# Patient Record
Sex: MALE | Race: WHITE | NOT HISPANIC OR LATINO | Employment: OTHER | URBAN - METROPOLITAN AREA
[De-identification: names, ages, dates, MRNs, and addresses within clinical notes are randomized per-mention and may not be internally consistent; named-entity substitution may affect disease eponyms.]

---

## 2017-01-27 ENCOUNTER — PATIENT MESSAGE (OUTPATIENT)
Dept: FAMILY MEDICINE | Facility: CLINIC | Age: 67
End: 2017-01-27

## 2017-01-27 RX ORDER — TRAMADOL HYDROCHLORIDE 50 MG/1
50 TABLET ORAL EVERY 6 HOURS PRN
Qty: 30 TABLET | Refills: 0 | Status: SHIPPED | OUTPATIENT
Start: 2017-01-27 | End: 2017-02-11

## 2017-01-27 NOTE — TELEPHONE ENCOUNTER
I have refilled the patient's requested medication x 1 month.   However, this is a scheduled narcotic and now needs a face to face visit every 6 months.

## 2017-03-10 ENCOUNTER — PATIENT MESSAGE (OUTPATIENT)
Dept: FAMILY MEDICINE | Facility: CLINIC | Age: 67
End: 2017-03-10

## 2017-03-10 RX ORDER — ATORVASTATIN CALCIUM 40 MG/1
TABLET, FILM COATED ORAL
Qty: 30 TABLET | Status: CANCELLED | OUTPATIENT
Start: 2017-03-10

## 2017-03-10 RX ORDER — ATORVASTATIN CALCIUM 40 MG/1
40 TABLET, FILM COATED ORAL DAILY
Qty: 90 TABLET | Refills: 2 | Status: SHIPPED | OUTPATIENT
Start: 2017-03-10 | End: 2017-03-27 | Stop reason: SDUPTHER

## 2017-03-10 RX ORDER — CLOPIDOGREL BISULFATE 75 MG/1
TABLET ORAL
Qty: 90 TABLET | Refills: 2 | Status: SHIPPED | OUTPATIENT
Start: 2017-03-10 | End: 2017-03-27 | Stop reason: SDUPTHER

## 2017-03-14 ENCOUNTER — PATIENT OUTREACH (OUTPATIENT)
Dept: ADMINISTRATIVE | Facility: HOSPITAL | Age: 67
End: 2017-03-14

## 2017-03-14 DIAGNOSIS — I10 ESSENTIAL HYPERTENSION: Primary | ICD-10-CM

## 2017-03-14 DIAGNOSIS — E78.5 HYPERLIPIDEMIA, UNSPECIFIED HYPERLIPIDEMIA TYPE: ICD-10-CM

## 2017-03-27 ENCOUNTER — TELEPHONE (OUTPATIENT)
Dept: FAMILY MEDICINE | Facility: CLINIC | Age: 67
End: 2017-03-27

## 2017-03-27 ENCOUNTER — OFFICE VISIT (OUTPATIENT)
Dept: FAMILY MEDICINE | Facility: CLINIC | Age: 67
End: 2017-03-27
Payer: MEDICARE

## 2017-03-27 VITALS
HEIGHT: 70 IN | BODY MASS INDEX: 28.68 KG/M2 | HEART RATE: 55 BPM | TEMPERATURE: 98 F | DIASTOLIC BLOOD PRESSURE: 61 MMHG | WEIGHT: 200.31 LBS | SYSTOLIC BLOOD PRESSURE: 107 MMHG

## 2017-03-27 DIAGNOSIS — M70.61 TROCHANTERIC BURSITIS, RIGHT HIP: ICD-10-CM

## 2017-03-27 DIAGNOSIS — E78.5 HYPERLIPIDEMIA, UNSPECIFIED HYPERLIPIDEMIA TYPE: Primary | ICD-10-CM

## 2017-03-27 DIAGNOSIS — F51.01 PRIMARY INSOMNIA: ICD-10-CM

## 2017-03-27 DIAGNOSIS — I10 ESSENTIAL HYPERTENSION: ICD-10-CM

## 2017-03-27 DIAGNOSIS — F41.9 ANXIETY: ICD-10-CM

## 2017-03-27 DIAGNOSIS — D68.9 CLOTTING DISORDER: ICD-10-CM

## 2017-03-27 PROCEDURE — 1160F RVW MEDS BY RX/DR IN RCRD: CPT | Mod: S$GLB,,, | Performed by: FAMILY MEDICINE

## 2017-03-27 PROCEDURE — 99999 PR PBB SHADOW E&M-EST. PATIENT-LVL III: CPT | Mod: PBBFAC,,, | Performed by: FAMILY MEDICINE

## 2017-03-27 PROCEDURE — 1159F MED LIST DOCD IN RCRD: CPT | Mod: S$GLB,,, | Performed by: FAMILY MEDICINE

## 2017-03-27 PROCEDURE — 1126F AMNT PAIN NOTED NONE PRSNT: CPT | Mod: S$GLB,,, | Performed by: FAMILY MEDICINE

## 2017-03-27 PROCEDURE — 99499 UNLISTED E&M SERVICE: CPT | Mod: S$GLB,,, | Performed by: FAMILY MEDICINE

## 2017-03-27 PROCEDURE — 3074F SYST BP LT 130 MM HG: CPT | Mod: S$GLB,,, | Performed by: FAMILY MEDICINE

## 2017-03-27 PROCEDURE — 3078F DIAST BP <80 MM HG: CPT | Mod: S$GLB,,, | Performed by: FAMILY MEDICINE

## 2017-03-27 PROCEDURE — 1157F ADVNC CARE PLAN IN RCRD: CPT | Mod: S$GLB,,, | Performed by: FAMILY MEDICINE

## 2017-03-27 PROCEDURE — 99214 OFFICE O/P EST MOD 30 MIN: CPT | Mod: S$GLB,,, | Performed by: FAMILY MEDICINE

## 2017-03-27 RX ORDER — ALPRAZOLAM 0.5 MG/1
0.5 TABLET ORAL 3 TIMES DAILY
Qty: 270 TABLET | Refills: 1 | Status: SHIPPED | OUTPATIENT
Start: 2017-03-27 | End: 2018-02-21

## 2017-03-27 RX ORDER — ZOLPIDEM TARTRATE 5 MG/1
5 TABLET ORAL NIGHTLY PRN
Qty: 90 TABLET | Refills: 1 | Status: SHIPPED | OUTPATIENT
Start: 2017-03-27 | End: 2017-06-22 | Stop reason: SDUPTHER

## 2017-03-27 RX ORDER — METOPROLOL TARTRATE 25 MG/1
25 TABLET, FILM COATED ORAL DAILY
Qty: 90 TABLET | Refills: 3 | Status: SHIPPED | OUTPATIENT
Start: 2017-03-27 | End: 2017-08-21 | Stop reason: SDUPTHER

## 2017-03-27 RX ORDER — TRAMADOL HYDROCHLORIDE 50 MG/1
50 TABLET ORAL EVERY 6 HOURS PRN
Qty: 40 TABLET | Refills: 0 | Status: SHIPPED | OUTPATIENT
Start: 2017-03-27 | End: 2017-05-12 | Stop reason: SDUPTHER

## 2017-03-27 RX ORDER — ALPRAZOLAM 0.5 MG/1
0.5 TABLET, EXTENDED RELEASE ORAL DAILY
Qty: 90 TABLET | Refills: 1 | Status: SHIPPED | OUTPATIENT
Start: 2017-03-27 | End: 2017-03-27

## 2017-03-27 RX ORDER — CLOPIDOGREL BISULFATE 75 MG/1
75 TABLET ORAL DAILY
Qty: 90 TABLET | Refills: 3 | Status: SHIPPED | OUTPATIENT
Start: 2017-03-27 | End: 2018-01-02

## 2017-03-27 RX ORDER — ATORVASTATIN CALCIUM 40 MG/1
40 TABLET, FILM COATED ORAL DAILY
Qty: 90 TABLET | Refills: 3 | Status: SHIPPED | OUTPATIENT
Start: 2017-03-27 | End: 2017-05-12

## 2017-03-27 NOTE — TELEPHONE ENCOUNTER
Patient was given a prescription for xanax xr  Insurance does not cover xr  He had been on plain xanax 0.5 three times daily  Please advise

## 2017-03-27 NOTE — TELEPHONE ENCOUNTER
I have signed for the following orders AND/OR meds.  Please call the patient and ask the patient to schedule the testing AND/OR inform about any medications that were sent.      No orders of the defined types were placed in this encounter.        Medications Ordered This Encounter      alprazolam (XANAX) 0.5 MG tablet          Sig: Take 1 tablet (0.5 mg total) by mouth 3 (three) times daily.          Dispense:  270 tablet          Refill:  1

## 2017-03-27 NOTE — TELEPHONE ENCOUNTER
----- Message from Dahiana Tomas sent at 3/27/2017 11:38 AM CDT -----  Call [facility] at  [phone]//regarding the  xanax rx from this morning have questions//gustavo raza

## 2017-03-27 NOTE — MR AVS SNAPSHOT
[facility]  [address] [facility] [place] [address]  Phone: [phone]  Fax: [phone]                  Cruz [place]   3/27/2017 8:00 AM   Office Visit    Description:  Male : 1950   Provider:  Binh Cat MD   Department:  [facility]           Reason for Visit     Follow-up           Diagnoses this Visit        Comments    Hyperlipidemia, unspecified hyperlipidemia type    -  Primary     Essential hypertension         Clotting disorder         Anxiety         Primary insomnia         Trochanteric bursitis, right hip                To Do List           Future Appointments        Provider Department Dept Phone    2017 8:35 AM LABORATORY, [place] [facility] [phone]      Goals (5 Years of Data)     None       These Medications        Disp Refills Start End    metoprolol tartrate (LOPRESSOR) 25 MG tablet 90 tablet 3 3/27/2017     Take 1 tablet (25 mg total) by mouth once daily. 1 Tablet(s) Oral PRN Every day. - Oral    Pharmacy: [facility] [place], [place] - [address] [place] [address] Ph #: [phone]       clopidogrel (PLAVIX) 75 mg tablet 90 tablet 3 3/27/2017     Take 1 tablet (75 mg total) by mouth once daily. - Oral    Pharmacy: [facility] [place]  [id number] [place] [address] Ph #: [phone]       atorvastatin (LIPITOR) 40 MG tablet 90 tablet 3 3/27/2017     Take 1 tablet (40 mg total) by mouth once daily. - Oral    Pharmacy: [facility] [place], [place] - [id number] [place] [address] Ph #: [phone]       alprazolam (XANAX XR) 0.5 MG Tb24 90 tablet 1 3/27/2017     Take 1 tablet (0.5 mg total) by mouth once daily. - Oral    [facility]  [id number] [place] [address] Ph #: [phone]       zolpidem (AMBIEN) 5 MG Tab 90 tablet 1 3/27/2017 2017    Take 1 tablet (5 mg total) by mouth nightly as needed. - Oral    Pharmacy: [facility]  [place] [address] Ph #: [phone]       tramadol (ULTRAM) 50 mg tablet 40 tablet 0 3/27/2017 4/6/2017    Take 1 tablet (50 mg total) by mouth every 6 (six) hours as needed for Pain. - Oral    Pharmacy: [facility] [place] [address] Ph #: [phone]         [facility]     [facility] On Call Nurse Care Line - 24/7 Assistance  Registered nurses in the [facility] On Call Center provide clinical advisement, health education, appointment booking, and other advisory services.  Call for this free service at [phone].             Medications           Message regarding Medications     Verify the changes and/or additions to your medication regime listed below are the same as discussed with your clinician today.  If any of these changes or additions are incorrect, please notify your healthcare provider.        START taking these NEW medications        Refills    zolpidem (AMBIEN) 5 MG Tab 1    Sig: Take 1 tablet (5 mg total) by mouth nightly as needed.    Class: Normal    Route: Oral    tramadol (ULTRAM) 50 mg tablet 0    Sig: Take 1 tablet (50 mg total) by mouth every 6 (six) hours as needed for Pain.    Class: Normal    Route: Oral      CHANGE how you are taking these medications     Start Taking Instead of    clopidogrel (PLAVIX) 75 mg tablet clopidogrel (PLAVIX) 75 mg tablet    Dosage:  Take 1 tablet (75 mg total) by mouth once daily. Dosage:  TAKE ONE TABLET BY MOUTH ONCE DAILY    Reason for Change:  Reorder     alprazolam (XANAX XR) 0.5 MG Tb24 alprazolam (XANAX XR) 0.5 MG Tb24    Dosage:  Take 1 tablet (0.5 mg total) by mouth once daily. Dosage:  Take by mouth once daily.    Reason for Change:  Reorder       STOP taking these medications     diclofenac (VOLTAREN) 75 MG EC tablet Take 1 tablet (75 mg total) by mouth 2 (two) times daily.    colchicine 0.6 mg tablet Take 1 tablet (0.6 mg total) by mouth once daily.           Verify that the below list of medications is  "an accurate representation of the medications you are currently taking.  If none reported, the list may be blank. If incorrect, please contact your healthcare provider. Carry this list with you in case of emergency.           Current Medications     alprazolam (XANAX XR) 0.5 MG Tb24 Take 1 tablet (0.5 mg total) by mouth once daily.    aspirin 81 MG chewable tablet 81mg Oral PRN .      atorvastatin (LIPITOR) 40 MG tablet Take 1 tablet (40 mg total) by mouth once daily.    clopidogrel (PLAVIX) 75 mg tablet Take 1 tablet (75 mg total) by mouth once daily.    metoprolol tartrate (LOPRESSOR) 25 MG tablet Take 1 tablet (25 mg total) by mouth once daily. 1 Tablet(s) Oral PRN Every day.    tramadol (ULTRAM) 50 mg tablet Take 1 tablet (50 mg total) by mouth every 6 (six) hours as needed for Pain.    zolpidem (AMBIEN) 5 MG Tab Take 1 tablet (5 mg total) by mouth nightly as needed.           Clinical Reference Information           Your Vitals Were     BP Pulse Temp Height Weight BMI    107/61 (BP Location: Left arm, Patient Position: Sitting, BP Method: Automatic) 55 97.9 °F (36.6 °C) (Oral) 5' 10" (1.778 m) 90.9 kg (200 lb 4.6 oz) 28.74 kg/m2      Blood Pressure          Most Recent Value    BP  107/61      Allergies as of 3/27/2017     Penicillin G    Penicillins      Immunizations Administered on Date of Encounter - 3/27/2017     None      Language Assistance Services     ATTENTION: Language assistance services are available, free of charge. Please call [phone].      ATENCIÓN: Si habla español, tiene a paige disposición servicios gratuitos de asistencia lingüística. Llame al [phone].     [facility] Ý: N?u b?n nói Ti?ng Vi?t, có các d?ch v? h? tr? ngôn ng? mi?n phí dành cho b?n. G?i s? [phone].         [facility] complies with applicable Federal civil rights laws and does not discriminate on the basis of race, color, national origin, age, disability, or sex.        "

## 2017-03-27 NOTE — PROGRESS NOTES
"Subjective:      Patient ID: Cruz [place] is a 66 y.o. male.    Chief Complaint: Follow-up    HPI  The patient presents with hyperlipidemia. The patient reports tolerating the medication well and is in excellent compliance. There have been no medication side effects. The patient denies chest pain, neuropathy, and myalgias. The patient has reduced fat intake and has been exercising. Current treatment has included the medications listed in the med card.   Lab Results   Component Value Date    CHOL 140 11/07/2016    CHOL 150 05/06/2016    CHOL 142 11/05/2015     Lab Results   Component Value Date    HDL 42 11/07/2016    HDL 45 05/06/2016    HDL 41 11/05/2015     Lab Results   Component Value Date    LDLCALC 78.2 11/07/2016    LDLCALC 88.4 05/06/2016    LDLCALC 77.6 11/05/2015     Lab Results   Component Value Date    TRIG 99 11/07/2016    TRIG 83 05/06/2016    TRIG 117 11/05/2015     Lab Results   Component Value Date    CHOLHDL 30.0 11/07/2016    CHOLHDL 30.0 05/06/2016    CHOLHDL 28.9 11/05/2015     Lab Results   Component Value Date    ALT 19 11/07/2016    AST 21 11/07/2016    ALKPHOS 64 11/07/2016    BILITOT 0.3 11/07/2016   a recheck is due on 5/9 and orders are in.       The patient presents with essential hypertension. The patient is tolerating the medication well and is in excellent compliance. The patient is experiencing no side effects. Counseling was offered regarding low salt diets. The patient has a reduced salt intake. The patient denies chest pain, palpitations, shortness of breath, dyspnea on exertion, left or murmur neck pain, nausea, vomiting, diaphoresis, paroxysmal nocturnal dyspnea, and orthopnea. /61 (BP Location: Left arm, Patient Position: Sitting, BP Method: Automatic)  Pulse (!) 55  Temp 97.9 °F (36.6 °C) (Oral)   Ht 5' 10" (1.778 m)  Wt 90.9 kg (200 lb 4.6 oz)  BMI 28.74 kg/m2       He does have a history of a clotting disorder and he has been on plavix and asa since 2011. He " has not had any bleeding noted. hei s also on aspirin.      He is on xanax 0.5 twice a day. It has helped with his anxiety. HE has been on this for a long time.      He has an intermittent flare of his trochanteric bursitis and he had some tramadol and he states that he would like to have a refill on hand in case this hurts him.       Health Maintenance Due   Topic Date Due    Zoster Vaccine  06/27/2009    Influenza Vaccine  08/01/2016       Past Medical History:  Past Medical History:   Diagnosis Date    Anemia     Colon polyps 8/14/2012    adenomatous-recheck in 2017    Diverticulosis     Hyperlipidemia LDL goal < 100     Hypertension      Past Surgical History:   Procedure Laterality Date    cardiac bypass      CARPAL TUNNEL RELEASE      COLONOSCOPY  2012    CORONARY ARTERY BYPASS GRAFT      HERNIA MESH REMOVAL      HERNIA REPAIR      NASAL SEPTUM SURGERY      septoplasty and SMR turbinoplasty     [place] COLONOSCOPY,SHIKHA MONTESINOS/CAUTERCLAYTON  8/14/2012         VASECTOMY      VASECTOMY       Review of patient's allergies indicates:   Allergen Reactions    Penicillin g     Penicillins      Other reaction(s): Unknown     Current Outpatient Prescriptions on File Prior to Visit   Medication Sig Dispense Refill    alprazolam (XANAX XR) 0.5 MG Tb24 Take by mouth once daily.      aspirin 81 MG chewable tablet 81mg Oral PRN .        atorvastatin (LIPITOR) 40 MG tablet Take 1 tablet (40 mg total) by mouth once daily. 90 tablet 2    clopidogrel (PLAVIX) 75 mg tablet TAKE ONE TABLET BY MOUTH ONCE DAILY 90 tablet 2    colchicine 0.6 mg tablet Take 1 tablet (0.6 mg total) by mouth once daily. 30 tablet 11    metoprolol tartrate (LOPRESSOR) 25 MG tablet Take 1 tablet (25 mg total) by mouth once daily. 1 Tablet(s) Oral PRN Every day. 90 tablet 3    zolpidem (AMBIEN) 10 mg Tab Take 1 tablet (10 mg total) by mouth nightly. 90 tablet 0    [DISCONTINUED] diclofenac (VOLTAREN) 75 MG EC tablet Take 1 tablet (75  "mg total) by mouth 2 (two) times daily. 30 tablet 0     No current facility-administered medications on file prior to visit.      Social History     Social History    Marital status:      Spouse name: N/A    Number of children: N/A    Years of education: N/A     Occupational History    Not on file.     Social History Main Topics    Smoking status: Former Smoker     Quit date: 2/10/2008    Smokeless tobacco: Never Used    Alcohol use 1.8 oz/week     1 Glasses of wine, 2 Cans of beer per week      Comment: 2/day    Drug use: No    Sexual activity: Yes     Partners: Female     Other Topics Concern    Not on file     Social History Narrative     Family History   Problem Relation Age of Onset    Diabetes Mother     Cancer Mother     Diabetes Sister     Cancer Paternal Uncle     Colon cancer Neg Hx              Review of Systems   Constitutional: Negative.  Negative for chills, diaphoresis and fever.   HENT: Negative for congestion, hearing loss, mouth sores, postnasal drip and sore throat.    Eyes: Negative for pain and visual disturbance.   Respiratory: Negative for cough, chest tightness, shortness of breath and wheezing.    Cardiovascular: Negative for chest pain.   Gastrointestinal: Negative for abdominal pain, anal bleeding, blood in stool, constipation, diarrhea, nausea and vomiting.   Genitourinary: Negative for dysuria and hematuria.   Musculoskeletal: Negative for back pain, neck pain and neck stiffness.   Skin: Negative for rash.   Neurological: Negative for dizziness and weakness.       Objective:   /61 (BP Location: Left arm, Patient Position: Sitting, BP Method: Automatic)  Pulse (!) 55  Temp 97.9 °F (36.6 °C) (Oral)   Ht 5' 10" (1.778 m)  Wt 90.9 kg (200 lb 4.6 oz)  BMI 28.74 kg/m2    Physical Exam   Constitutional: He is oriented to person, place, and time. He appears well-developed and well-nourished.   HENT:   Head: Normocephalic and atraumatic.   Right Ear: External ear " normal.   Left Ear: External ear normal.   Nose: Nose normal.   Mouth/Throat: Oropharynx is clear and moist. No oropharyngeal exudate.   Eyes: Conjunctivae and EOM are normal. Pupils are equal, round, and reactive to light. Right eye exhibits no discharge. Left eye exhibits no discharge. No scleral icterus.   Neck: Normal range of motion. Neck supple. No JVD present. No thyromegaly present.   Cardiovascular: Normal rate, regular rhythm, normal heart sounds and intact distal pulses.  Exam reveals no gallop and no friction rub.    No murmur heard.  Pulmonary/Chest: Effort normal and breath sounds normal. No respiratory distress. He has no wheezes. He has no rales. He exhibits no tenderness.   Abdominal: Soft. Bowel sounds are normal. He exhibits no distension and no mass. There is no tenderness. There is no rebound and no guarding.   Genitourinary: Rectal exam shows no mass and no tenderness. Prostate is not enlarged and not tender.   Musculoskeletal: Normal range of motion. He exhibits no edema or tenderness.   Lymphadenopathy:     He has no cervical adenopathy.   Neurological: He is alert and oriented to person, place, and time. No cranial nerve deficit. Coordination normal.   Skin: Skin is warm and dry. He is not diaphoretic.   Psychiatric: He has a normal mood and affect.       Assessment:     1. Hyperlipidemia, unspecified hyperlipidemia type    2. Essential hypertension    3. Clotting disorder    4. Anxiety    5. Primary insomnia    6. Trochanteric bursitis, right hip        Plan:   Cruz was seen today for follow-up.    Diagnoses and all orders for this visit:    Hyperlipidemia, unspecified hyperlipidemia type  -     atorvastatin (LIPITOR) 40 MG tablet; Take 1 tablet (40 mg total) by mouth once daily.    Essential hypertension  -     metoprolol tartrate (LOPRESSOR) 25 MG tablet; Take 1 tablet (25 mg total) by mouth once daily. 1 Tablet(s) Oral PRN Every day.    Clotting disorder  -     clopidogrel (PLAVIX) 75  mg tablet; Take 1 tablet (75 mg total) by mouth once daily.    Anxiety  -     alprazolam (XANAX XR) 0.5 MG Tb24; Take 1 tablet (0.5 mg total) by mouth once daily.    Primary insomnia  -     zolpidem (AMBIEN) 5 MG Tab; Take 1 tablet (5 mg total) by mouth nightly as needed.    Trochanteric bursitis, right hip  -     tramadol (ULTRAM) 50 mg tablet; Take 1 tablet (50 mg total) by mouth every 6 (six) hours as needed for Pain.      rtc in 6 months. Get his labs in May as scheduled.

## 2017-05-09 ENCOUNTER — LAB VISIT (OUTPATIENT)
Dept: LAB | Facility: HOSPITAL | Age: 67
End: 2017-05-09
Attending: FAMILY MEDICINE
Payer: MEDICARE

## 2017-05-09 DIAGNOSIS — I10 ESSENTIAL HYPERTENSION: ICD-10-CM

## 2017-05-09 DIAGNOSIS — E78.5 HYPERLIPIDEMIA, UNSPECIFIED HYPERLIPIDEMIA TYPE: ICD-10-CM

## 2017-05-09 LAB
ALBUMIN SERPL BCP-MCNC: 3.9 G/DL
ALP SERPL-CCNC: 95 U/L
ALT SERPL W/O P-5'-P-CCNC: 23 U/L
ANION GAP SERPL CALC-SCNC: 10 MMOL/L
AST SERPL-CCNC: 22 U/L
BILIRUB SERPL-MCNC: 0.4 MG/DL
BUN SERPL-MCNC: 12 MG/DL
CALCIUM SERPL-MCNC: 9.2 MG/DL
CHLORIDE SERPL-SCNC: 107 MMOL/L
CHOLEST/HDLC SERPL: 6.2 {RATIO}
CO2 SERPL-SCNC: 24 MMOL/L
CREAT SERPL-MCNC: 0.9 MG/DL
EST. GFR  (AFRICAN AMERICAN): >60 ML/MIN/1.73 M^2
EST. GFR  (NON AFRICAN AMERICAN): >60 ML/MIN/1.73 M^2
GLUCOSE SERPL-MCNC: 106 MG/DL
HDL/CHOLESTEROL RATIO: 16.1 %
HDLC SERPL-MCNC: 218 MG/DL
HDLC SERPL-MCNC: 35 MG/DL
LDLC SERPL CALC-MCNC: 126.4 MG/DL
NONHDLC SERPL-MCNC: 183 MG/DL
POTASSIUM SERPL-SCNC: 4.4 MMOL/L
PROT SERPL-MCNC: 7.2 G/DL
SODIUM SERPL-SCNC: 141 MMOL/L
TRIGL SERPL-MCNC: 283 MG/DL

## 2017-05-09 PROCEDURE — 80053 COMPREHEN METABOLIC PANEL: CPT

## 2017-05-09 PROCEDURE — 80061 LIPID PANEL: CPT

## 2017-05-09 PROCEDURE — 36415 COLL VENOUS BLD VENIPUNCTURE: CPT | Mod: PO

## 2017-05-10 NOTE — PROGRESS NOTES
The 10-year ASCVD risk score (Brimsonsameer CHOI Jr, et al., 2013) is: 16%    Values used to calculate the score:      Age: 66 years      Sex: Male      Is Non- : No      Diabetic: No      Tobacco smoker: No      Systolic Blood Pressure: 107 mmHg      Is BP treated: Yes      HDL Cholesterol: 35 mg/dL      Total Cholesterol: 218 mg/dL    The patient has an abnormal lipid profile.  Book the patient for a lipid/liver panel in 3 months and send the patient the appointment for this.   The patient will need to have a change in the medicine to include GENERIC CRESTOR 40 MG A DAY INSTEAD OF THE LIPITOR.    Please make the change in the medcard to reflect this change and send a prescription for all of the patient's lipid medications to the pharmacy and refill x 12.    The patient has a normal CMP.  Please ask the patient to recheck the CMP in 1 year at the time of the annual physical if it is still indicated.

## 2017-05-12 ENCOUNTER — PATIENT MESSAGE (OUTPATIENT)
Dept: FAMILY MEDICINE | Facility: CLINIC | Age: 67
End: 2017-05-12

## 2017-05-12 DIAGNOSIS — M70.61 TROCHANTERIC BURSITIS, RIGHT HIP: ICD-10-CM

## 2017-05-12 DIAGNOSIS — E78.5 HYPERLIPIDEMIA, UNSPECIFIED HYPERLIPIDEMIA TYPE: Primary | ICD-10-CM

## 2017-05-12 RX ORDER — ROSUVASTATIN CALCIUM 40 MG/1
40 TABLET, COATED ORAL NIGHTLY
COMMUNITY
End: 2017-05-12 | Stop reason: SDUPTHER

## 2017-05-12 RX ORDER — TRAMADOL HYDROCHLORIDE 50 MG/1
50 TABLET ORAL EVERY 6 HOURS PRN
Qty: 40 TABLET | Refills: 0 | Status: SHIPPED | OUTPATIENT
Start: 2017-05-12 | End: 2017-05-22

## 2017-05-12 RX ORDER — ROSUVASTATIN CALCIUM 40 MG/1
40 TABLET, COATED ORAL NIGHTLY
Qty: 30 TABLET | Refills: 11 | Status: SHIPPED | OUTPATIENT
Start: 2017-05-12 | End: 2017-08-16 | Stop reason: SDUPTHER

## 2017-05-12 NOTE — TELEPHONE ENCOUNTER
I have signed for the following orders AND/OR meds.  Please call the patient and ask the patient to schedule the testing AND/OR inform about any medications that were sent.      Orders Placed This Encounter   Procedures    Hepatic function panel     Standing Status:   Future     Standing Expiration Date:   5/12/2018    Lipid panel     Standing Status:   Future     Standing Expiration Date:   5/12/2018         Medications Ordered This Encounter      rosuvastatin (CRESTOR) 40 MG Tab          Sig: Take 1 tablet (40 mg total) by mouth every evening.          Dispense:  30 tablet          Refill:  11

## 2017-05-12 NOTE — TELEPHONE ENCOUNTER
----- Message from Binh Cat MD sent at 5/10/2017  7:42 AM CDT -----  The 10-year ASCVD risk score (Inderjitsameer CHOI Jr, et al., 2013) is: 16%    Values used to calculate the score:      Age: 66 years      Sex: Male      Is Non- : No      Diabetic: No      Tobacco smoker: No      Systolic Blood Pressure: 107 mmHg      Is BP treated: Yes      HDL Cholesterol: 35 mg/dL      Total Cholesterol: 218 mg/dL    The patient has an abnormal lipid profile.  Book the patient for a lipid/liver panel in 3 months and send the patient the appointment for this.   The patient will need to have a change in the medicine to include GENERIC CRESTOR 40 MG A DAY INSTEAD OF THE LIPITOR.    Please make the change in the medcard to reflect this change and send a prescription for all of the patient's lipid medications to the pharmacy and refill x 12.    The patient has a normal CMP.  Please ask the patient to recheck the CMP in 1 year at the time of the annual physical if it is still indicated.

## 2017-06-02 ENCOUNTER — OFFICE VISIT (OUTPATIENT)
Dept: FAMILY MEDICINE | Facility: CLINIC | Age: 67
End: 2017-06-02
Payer: MEDICARE

## 2017-06-02 VITALS
BODY MASS INDEX: 29.03 KG/M2 | SYSTOLIC BLOOD PRESSURE: 125 MMHG | HEIGHT: 70 IN | DIASTOLIC BLOOD PRESSURE: 70 MMHG | HEART RATE: 48 BPM | WEIGHT: 202.81 LBS

## 2017-06-02 DIAGNOSIS — M25.551 RIGHT HIP PAIN: ICD-10-CM

## 2017-06-02 DIAGNOSIS — D64.9 ANEMIA, UNSPECIFIED TYPE: ICD-10-CM

## 2017-06-02 DIAGNOSIS — E78.5 HYPERLIPIDEMIA, UNSPECIFIED HYPERLIPIDEMIA TYPE: ICD-10-CM

## 2017-06-02 DIAGNOSIS — D68.9 CLOTTING DISORDER: ICD-10-CM

## 2017-06-02 DIAGNOSIS — Z00.00 ENCOUNTER FOR PREVENTIVE HEALTH EXAMINATION: Primary | ICD-10-CM

## 2017-06-02 DIAGNOSIS — I10 ESSENTIAL HYPERTENSION: ICD-10-CM

## 2017-06-02 PROCEDURE — 99499 UNLISTED E&M SERVICE: CPT | Mod: S$GLB,,, | Performed by: NURSE PRACTITIONER

## 2017-06-02 PROCEDURE — G0439 PPPS, SUBSEQ VISIT: HCPCS | Mod: S$GLB,,, | Performed by: NURSE PRACTITIONER

## 2017-06-02 PROCEDURE — 99999 PR PBB SHADOW E&M-EST. PATIENT-LVL IV: CPT | Mod: PBBFAC,,, | Performed by: NURSE PRACTITIONER

## 2017-06-02 RX ORDER — TRAMADOL HYDROCHLORIDE 50 MG/1
50 TABLET ORAL EVERY 6 HOURS PRN
COMMUNITY
End: 2017-08-17 | Stop reason: SDUPTHER

## 2017-06-02 RX ORDER — DICLOFENAC SODIUM 10 MG/G
2 GEL TOPICAL DAILY PRN
COMMUNITY
End: 2017-06-02 | Stop reason: SDUPTHER

## 2017-06-02 RX ORDER — DICLOFENAC SODIUM 10 MG/G
2 GEL TOPICAL DAILY PRN
Qty: 100 G | Refills: 3 | Status: SHIPPED | OUTPATIENT
Start: 2017-06-02 | End: 2017-08-21 | Stop reason: SDUPTHER

## 2017-06-02 NOTE — PATIENT INSTRUCTIONS
Counseling and Referral of Other Preventative  (Italic type indicates deductible and co-insurance are waived)    Patient Name: Cruz [place]  Today's Date: 6/2/2017      SERVICE LIMITATIONS RECOMMENDATION    Vaccines    · Pneumococcal (once after 65)    · Influenza (annually)    · Hepatitis B (if medium/high risk)    · Prevnar 13      Hepatitis B medium/high risk factors:       - End-stage renal disease       - Hemophiliacs who received Factor VII or         IX concentrates       - Clients of institutions for the mentally             retarded       - Persons who live in the same house as          a HepB carrier       - Homosexual men       - Illicit injectable drug abusers     Pneumococcal: Scheduled - see appointments     Influenza: Done, repeat in one year     Hepatitis B: N/A     Prevnar 13: Done, no repeat necessary    Prostate cancer screening (annually to age 75)     Prostate specific antigen (PSA) Shared decision making with Provider. Sometimes a co-pay may be required if the patient decides to have this test. The USPSTF no longer recommends prostate cancer screening routinely in medicine: not to follow    Colorectal cancer screening (to age 75)    · Fecal occult blood test (annual)  · Flexible sigmoidoscopy (5y)  · Screening colonoscopy (10y)  · Barium enema   Last done 2012, recommend to repeat every 5-7 years  .    Diabetes self-management training (no USPSTF recommendations)  Requires referral by treating physician for patient with diabetes or renal disease. 10 hours of initial DSMT sessions of no less than 30 minutes each in a continuous 12-month period. 2 hours of follow-up DSMT in subsequent years.  N/A    Glaucoma screening (no USPSTF recommendation)  Diabetes mellitus, family history   , age 50 or over    American, age 65 or over  Scheduled, see appointments ([place] [facility] 8/2017)      Medical nutrition therapy for diabetes or renal disease (no recommended schedule)   Requires referral by treating physician for patient with diabetes or renal disease or kidney transplant within the past 3 years.  Can be provided in same year as diabetes self-management training (DSMT), and CMS recommends medical nutrition therapy take place after DSMT. Up to 3 hours for initial year and 2 hours in subsequent years.  N/A    Cardiovascular screening blood tests (every 5 years)  · Fasting lipid panel  Order as a panel if possible  Done this year, repeat every year    Diabetes screening tests (at least every 3 years, Medicare covers annually or at 6-month intervals for prediabetic patients)  · Fasting blood sugar (FBS) or glucose tolerance test (GTT)  Patient must be diagnosed with one of the following:       - Hypertension       - Dyslipidemia       - Obesity (BMI 30kg/m2)       - Previous elevated impaired FBS or GTT       ... or any two of the following:       - Overweight (BMI 25 but <30)       - Family history of diabetes       - Age 65 or older       - History of gestational diabetes or birth of baby weighing more than 9 pounds  Done this year, repeat every year    Abdominal aortic aneurysm screening (once)  · Sonogram   Limited to patients who meet one of the following criteria:       - Men who are 65-75 years old and have smoked more than 100 cigarette in their lifetime       - Anyone with a family history of abdominal aortic aneurysm       - Anyone recommended for screening by the USPSTF  Done this year, repeat every year    HIV screening (annually for increased risk patients)  · HIV-1 and HIV-2 by EIA, or MILY, rapid antibody test or oral mucosa transudate  Patients must be at increased risk for HIV infection per USPSTF guidelines or pregnant. Tests covered annually for patient at increased risk or as requested by the patient. Pregnant patients may receive up to 3 tests during pregnancy.  Risks discussed, screening is not recommended    Smoking cessation counseling (up to 8 sessions per  year)  Patients must be asymptomatic of tobacco-related conditions to receive as a preventative service.  Non-smoker    Subsequent annual wellness visit  At least 12 months since last AWV  Return in one year     The following information is provided to all patients.  This information is to help you find resources for any of the problems found today that may be affecting your health:                Living healthy guide: www.[facility].[place].[facility]      Understanding Diabetes: www.diabetes.org      Eating healthy: www.cdc.gov/healthyweight      CDC home safety checklist: www.cdc.gov/steadi/patient.html      Agency on Aging: www.goea.[place].[facility]      Alcoholics anonymous (AA): www.aa.org      Physical Activity: www.maru.nih.gov/zy7qnib      Tobacco use: www.quitwithusla.org

## 2017-06-02 NOTE — PROGRESS NOTES
"Cruz Dover presented for a  Medicare AWV and comprehensive Health Risk Assessment today. The following components were reviewed and updated:    · Medical history  · Family History  · Social history  · Allergies and Current Medications  · Health Risk Assessment  · Health Maintenance  · Care Team     Review of Systems   Constitutional: Negative for chills, fever and malaise/fatigue.   Respiratory: Negative for cough, shortness of breath and wheezing.    Cardiovascular: Negative for chest pain, palpitations and leg swelling.   Skin: Negative for rash.   Neurological: Negative for dizziness, weakness and headaches.       ** See Completed Assessments for Annual Wellness Visit within the encounter summary.**       The following assessments were completed:  · Living Situation  · CAGE  · Depression Screening  · Timed Get Up and Go  · Whisper Test  · Cognitive Function Screening      · Nutrition Screening  · ADL Screening  · PAQ Screening    Vitals:    06/02/17 0805   BP: 125/70   BP Location: Left arm   Patient Position: Sitting   BP Method: Automatic   Pulse: (!) 48   Weight: 92 kg (202 lb 13.2 oz)   Height: 5' 10" (1.778 m)     Body mass index is 29.1 kg/m².  Physical Exam   Constitutional: He is oriented to person, place, and time. He appears well-nourished.   Cardiovascular: Normal rate, regular rhythm, normal heart sounds and intact distal pulses.    Pulmonary/Chest: Effort normal and breath sounds normal.   Abdominal: Soft. Bowel sounds are normal. There is no tenderness.   Neurological: He is alert and oriented to person, place, and time.   Skin: Skin is warm and dry. No rash noted.   Vitals reviewed.        Diagnoses and health risks identified today and associated recommendations/orders:    1. Encounter for preventive health examination  Reviewed and discussed health maintenance.   Written rx given to patient to update zoster and pneumococcal    2. Right hip pain  - diclofenac sodium (VOLTAREN) 1 % Gel; Apply 2 " g topically daily as needed.  Dispense: 100 g; Refill: 3    3. Hyperlipidemia, unspecified hyperlipidemia type  Labs - reviewed 5/2017  Patient wasn't taking medications daily when labs were done. Changed to crestor and encouraged to take daily. Repeat labs 8/2017    4. Essential hypertension  Stable- continue current treatment    5. Clotting disorder  Stable- labs reviewed 11/2016    6. Anemia, unspecified type  Stable- labs reviewed 11/2016    Provided Cruz with a 5-10 year written screening schedule and personal prevention plan. Recommendations were developed using the USPSTF age appropriate recommendations. Education, counseling, and referrals were provided as needed. After Visit Summary printed and given to patient which includes a list of additional screenings\tests needed.    Rossy Pepe NP

## 2017-06-19 ENCOUNTER — PATIENT MESSAGE (OUTPATIENT)
Dept: FAMILY MEDICINE | Facility: CLINIC | Age: 67
End: 2017-06-19

## 2017-06-19 DIAGNOSIS — F51.01 PRIMARY INSOMNIA: ICD-10-CM

## 2017-06-19 RX ORDER — ZOLPIDEM TARTRATE 5 MG/1
5 TABLET ORAL NIGHTLY PRN
Qty: 90 TABLET | Refills: 1 | OUTPATIENT
Start: 2017-06-19 | End: 2017-12-18

## 2017-06-22 RX ORDER — ZOLPIDEM TARTRATE 5 MG/1
5 TABLET ORAL NIGHTLY PRN
Qty: 90 TABLET | Refills: 0 | Status: SHIPPED | OUTPATIENT
Start: 2017-06-22 | End: 2017-10-27 | Stop reason: SDUPTHER

## 2017-08-15 ENCOUNTER — LAB VISIT (OUTPATIENT)
Dept: LAB | Facility: HOSPITAL | Age: 67
End: 2017-08-15
Attending: FAMILY MEDICINE
Payer: MEDICARE

## 2017-08-15 DIAGNOSIS — E78.5 HYPERLIPIDEMIA, UNSPECIFIED HYPERLIPIDEMIA TYPE: ICD-10-CM

## 2017-08-15 PROCEDURE — 80061 LIPID PANEL: CPT

## 2017-08-15 PROCEDURE — 36415 COLL VENOUS BLD VENIPUNCTURE: CPT | Mod: PO

## 2017-08-15 PROCEDURE — 80076 HEPATIC FUNCTION PANEL: CPT

## 2017-08-16 DIAGNOSIS — E78.5 HYPERLIPIDEMIA, UNSPECIFIED HYPERLIPIDEMIA TYPE: ICD-10-CM

## 2017-08-16 LAB
ALBUMIN SERPL BCP-MCNC: 3.9 G/DL
ALP SERPL-CCNC: 91 U/L
ALT SERPL W/O P-5'-P-CCNC: 31 U/L
AST SERPL-CCNC: 33 U/L
BILIRUB DIRECT SERPL-MCNC: 0.2 MG/DL
BILIRUB SERPL-MCNC: 0.4 MG/DL
CHOLEST/HDLC SERPL: 2.2 {RATIO}
HDL/CHOLESTEROL RATIO: 45.6 %
HDLC SERPL-MCNC: 114 MG/DL
HDLC SERPL-MCNC: 52 MG/DL
LDLC SERPL CALC-MCNC: 53.2 MG/DL
NONHDLC SERPL-MCNC: 62 MG/DL
PROT SERPL-MCNC: 7.1 G/DL
TRIGL SERPL-MCNC: 44 MG/DL

## 2017-08-16 RX ORDER — ROSUVASTATIN CALCIUM 40 MG/1
40 TABLET, COATED ORAL NIGHTLY
Qty: 30 TABLET | Refills: 11 | Status: SHIPPED | OUTPATIENT
Start: 2017-08-16 | End: 2018-09-05 | Stop reason: SDUPTHER

## 2017-08-17 ENCOUNTER — PATIENT MESSAGE (OUTPATIENT)
Dept: FAMILY MEDICINE | Facility: CLINIC | Age: 67
End: 2017-08-17

## 2017-08-17 RX ORDER — TRAMADOL HYDROCHLORIDE 50 MG/1
50 TABLET ORAL EVERY 6 HOURS PRN
Qty: 40 TABLET | Refills: 0 | Status: SHIPPED | OUTPATIENT
Start: 2017-08-17 | End: 2017-09-27 | Stop reason: SDUPTHER

## 2017-08-21 ENCOUNTER — OFFICE VISIT (OUTPATIENT)
Dept: FAMILY MEDICINE | Facility: CLINIC | Age: 67
End: 2017-08-21
Payer: MEDICARE

## 2017-08-21 VITALS
SYSTOLIC BLOOD PRESSURE: 152 MMHG | DIASTOLIC BLOOD PRESSURE: 70 MMHG | HEIGHT: 70 IN | WEIGHT: 198.63 LBS | HEART RATE: 60 BPM | BODY MASS INDEX: 28.44 KG/M2 | TEMPERATURE: 98 F

## 2017-08-21 DIAGNOSIS — I10 ESSENTIAL HYPERTENSION: Primary | ICD-10-CM

## 2017-08-21 DIAGNOSIS — D68.9 CLOTTING DISORDER: ICD-10-CM

## 2017-08-21 DIAGNOSIS — M25.551 RIGHT HIP PAIN: ICD-10-CM

## 2017-08-21 PROCEDURE — 3077F SYST BP >= 140 MM HG: CPT | Mod: S$GLB,,, | Performed by: FAMILY MEDICINE

## 2017-08-21 PROCEDURE — 1159F MED LIST DOCD IN RCRD: CPT | Mod: S$GLB,,, | Performed by: FAMILY MEDICINE

## 2017-08-21 PROCEDURE — 99213 OFFICE O/P EST LOW 20 MIN: CPT | Mod: S$GLB,,, | Performed by: FAMILY MEDICINE

## 2017-08-21 PROCEDURE — 99999 PR PBB SHADOW E&M-EST. PATIENT-LVL III: CPT | Mod: PBBFAC,,, | Performed by: FAMILY MEDICINE

## 2017-08-21 PROCEDURE — 1126F AMNT PAIN NOTED NONE PRSNT: CPT | Mod: S$GLB,,, | Performed by: FAMILY MEDICINE

## 2017-08-21 PROCEDURE — 3078F DIAST BP <80 MM HG: CPT | Mod: S$GLB,,, | Performed by: FAMILY MEDICINE

## 2017-08-21 PROCEDURE — 99499 UNLISTED E&M SERVICE: CPT | Mod: S$GLB,,, | Performed by: FAMILY MEDICINE

## 2017-08-21 PROCEDURE — 3008F BODY MASS INDEX DOCD: CPT | Mod: S$GLB,,, | Performed by: FAMILY MEDICINE

## 2017-08-21 RX ORDER — METOPROLOL TARTRATE 25 MG/1
25 TABLET, FILM COATED ORAL 2 TIMES DAILY
Qty: 180 TABLET | Refills: 3 | Status: SHIPPED | OUTPATIENT
Start: 2017-08-21 | End: 2018-03-27

## 2017-08-21 RX ORDER — DICLOFENAC SODIUM 10 MG/G
2 GEL TOPICAL DAILY PRN
Qty: 300 G | Refills: 11 | Status: SHIPPED | OUTPATIENT
Start: 2017-08-21 | End: 2017-08-30 | Stop reason: SDUPTHER

## 2017-08-21 NOTE — PROGRESS NOTES
"Subjective:   Cruz Dover is a 67 y.o. male with hypertension.  He is tolerating the medication well and is in excellent compliance.  The patient is experiencing no side effects.  Counseling was offered regarding low salt diets.  He has a reduced salt intake.  He denies chest pain, palpitations, shortness of breath, dyspnea on exertion, left or murmur neck pain, nausea, vomiting, diaphoresis, paroxysmal nocturnal dyspnea, and orthopnea. Aalso, he wanted a refill of his voltaren.   He has had some wek spells and it has been high.      The patient's Health Maintenance was reviewed and the following appears to be due at this time:  Health Maintenance Due   Topic Date Due    Influenza Vaccine  08/01/2017       Outpatient Medications Prior to Visit   Medication Sig Dispense Refill    alprazolam (XANAX) 0.5 MG tablet Take 1 tablet (0.5 mg total) by mouth 3 (three) times daily. 270 tablet 1    aspirin 81 MG chewable tablet 81mg Oral PRN .        clopidogrel (PLAVIX) 75 mg tablet Take 1 tablet (75 mg total) by mouth once daily. 90 tablet 3    rosuvastatin (CRESTOR) 40 MG Tab Take 1 tablet (40 mg total) by mouth every evening. 30 tablet 11    tramadol (ULTRAM) 50 mg tablet Take 1 tablet (50 mg total) by mouth every 6 (six) hours as needed for Pain. 40 tablet 0    zolpidem (AMBIEN) 5 MG Tab Take 1 tablet (5 mg total) by mouth nightly as needed. 90 tablet 0    diclofenac sodium (VOLTAREN) 1 % Gel Apply 2 g topically daily as needed. 100 g 3    metoprolol tartrate (LOPRESSOR) 25 MG tablet Take 1 tablet (25 mg total) by mouth once daily. 1 Tablet(s) Oral PRN Every day. 90 tablet 3     No facility-administered medications prior to visit.         Objective:   BP (!) 152/70   Pulse 60   Temp 97.8 °F (36.6 °C) (Oral)   Ht 5' 10" (1.778 m)   Wt 90.1 kg (198 lb 10.2 oz)   BMI 28.50 kg/m²    Body mass index is 28.5 kg/m².  Genl:Alert and oriented in NAD.   Cardiovascular: Normal rate, regular rhythm, S1 normal, S2 " normal and normal heart sounds.  Exam reveals no gallop, no S3, no S4 and no friction rub.    No murmur heard.  Pulmonary/Chest: Effort normal and breath sounds normal. No stridor. Not tachypneic. No respiratory distress. The patient has no wheezes. The patient has no rhonchi. The patient has no rales.   Skin: The patient is not diaphoretic.     Encounter Diagnoses   Name Primary?    Clotting disorder     Right hip pain     Essential hypertension Yes       PLAN:    I have changed Mr. Dover's metoprolol tartrate. I am also having him maintain his aspirin, clopidogrel, alprazolam, zolpidem, rosuvastatin, tramadol, and diclofenac sodium.    Cruz was seen today for follow-up.    Diagnoses and all orders for this visit:    Essential hypertension  -     metoprolol tartrate (LOPRESSOR) 25 MG tablet; Take 1 tablet (25 mg total) by mouth 2 (two) times daily. 1 Tablet(s) Oral PRN Every day.    Clotting disorder    Right hip pain  -     diclofenac sodium (VOLTAREN) 1 % Gel; Apply 2 g topically daily as needed.        Medications Ordered This Encounter      diclofenac sodium (VOLTAREN) 1 % Gel          Sig: Apply 2 g topically daily as needed.          Dispense:  300 g          Refill:  11      metoprolol tartrate (LOPRESSOR) 25 MG tablet          Sig: Take 1 tablet (25 mg total) by mouth 2 (two) times daily. 1 Tablet(s) Oral PRN Every day.          Dispense:  180 tablet          Refill:  3  No orders of the defined types were placed in this encounter.    Use a low salt diet.   Exercise and diet enough to keep the BMI less than 30.    Answers for HPI/ROS submitted by the patient on 8/20/2017   activity change: Yes  unexpected weight change: No  neck pain: Yes  hearing loss: No  rhinorrhea: No  trouble swallowing: No  eye discharge: No  visual disturbance: No  chest tightness: No  wheezing: No  chest pain: No  palpitations: No  blood in stool: No  constipation: No  vomiting: No  diarrhea: No  polydipsia: No  polyuria:  No  difficulty urinating: No  urgency: Yes  hematuria: No  joint swelling: No  arthralgias: Yes  headaches: No  weakness: No  confusion: No  dysphoric mood: No

## 2017-08-30 ENCOUNTER — PATIENT MESSAGE (OUTPATIENT)
Dept: FAMILY MEDICINE | Facility: CLINIC | Age: 67
End: 2017-08-30

## 2017-08-30 DIAGNOSIS — M25.551 RIGHT HIP PAIN: ICD-10-CM

## 2017-08-30 RX ORDER — DICLOFENAC SODIUM 10 MG/G
2 GEL TOPICAL 4 TIMES DAILY
Qty: 300 G | Refills: 11 | Status: SHIPPED | OUTPATIENT
Start: 2017-08-30 | End: 2018-09-06 | Stop reason: SDUPTHER

## 2017-09-01 ENCOUNTER — OFFICE VISIT (OUTPATIENT)
Dept: FAMILY MEDICINE | Facility: CLINIC | Age: 67
End: 2017-09-01
Payer: MEDICARE

## 2017-09-01 VITALS
WEIGHT: 193.81 LBS | HEIGHT: 70 IN | TEMPERATURE: 98 F | SYSTOLIC BLOOD PRESSURE: 113 MMHG | DIASTOLIC BLOOD PRESSURE: 65 MMHG | HEART RATE: 65 BPM | BODY MASS INDEX: 27.75 KG/M2

## 2017-09-01 DIAGNOSIS — H81.10 BPPV (BENIGN PAROXYSMAL POSITIONAL VERTIGO), UNSPECIFIED LATERALITY: Primary | ICD-10-CM

## 2017-09-01 PROCEDURE — 1159F MED LIST DOCD IN RCRD: CPT | Mod: S$GLB,,, | Performed by: NURSE PRACTITIONER

## 2017-09-01 PROCEDURE — 1126F AMNT PAIN NOTED NONE PRSNT: CPT | Mod: S$GLB,,, | Performed by: NURSE PRACTITIONER

## 2017-09-01 PROCEDURE — 99999 PR PBB SHADOW E&M-EST. PATIENT-LVL III: CPT | Mod: PBBFAC,,, | Performed by: NURSE PRACTITIONER

## 2017-09-01 PROCEDURE — 3078F DIAST BP <80 MM HG: CPT | Mod: S$GLB,,, | Performed by: NURSE PRACTITIONER

## 2017-09-01 PROCEDURE — 99213 OFFICE O/P EST LOW 20 MIN: CPT | Mod: S$GLB,,, | Performed by: NURSE PRACTITIONER

## 2017-09-01 PROCEDURE — 3008F BODY MASS INDEX DOCD: CPT | Mod: S$GLB,,, | Performed by: NURSE PRACTITIONER

## 2017-09-01 PROCEDURE — 3074F SYST BP LT 130 MM HG: CPT | Mod: S$GLB,,, | Performed by: NURSE PRACTITIONER

## 2017-09-01 NOTE — PROGRESS NOTES
Subjective:       Patient ID: Cruz Dover is a 67 y.o. male.    Chief Complaint: Dizziness    Dizziness:   Chronicity:  New  Onset:  In the past 7 days  Progression since onset:  Unchanged  Frequency:  Every few minutes  Severity:  Moderate  Dizziness characteristics:  Sensation of movement and spinning inside head only   Associated symptoms: light-headedness.no hearing loss, no ear pain, no fever, no headaches, no nausea, no vomiting, no palpitations and no chest pain.  Aggravated by:  Position changes  Treatments tried:  Body position changes and rest  Improvements on treatment:  Mild      Review of Systems   Constitutional: Negative for fatigue, fever and unexpected weight change.   HENT: Negative for ear pain, hearing loss and sore throat.    Eyes: Negative.  Negative for pain and visual disturbance.   Respiratory: Negative for cough and shortness of breath.    Cardiovascular: Negative for chest pain and palpitations.   Gastrointestinal: Negative for abdominal pain, diarrhea, nausea and vomiting.   Genitourinary: Negative for dysuria and frequency.   Musculoskeletal: Negative for arthralgias and myalgias.   Skin: Negative for color change and rash.   Neurological: Positive for dizziness and light-headedness. Negative for headaches.   Psychiatric/Behavioral: Negative for sleep disturbance. The patient is not nervous/anxious.        Vitals:    09/01/17 1519   BP: 113/65   Pulse: 65   Temp: 98 °F (36.7 °C)       Objective:     Current Outpatient Prescriptions   Medication Sig Dispense Refill    alprazolam (XANAX) 0.5 MG tablet Take 1 tablet (0.5 mg total) by mouth 3 (three) times daily. 270 tablet 1    aspirin 81 MG chewable tablet 81mg Oral PRN .        clopidogrel (PLAVIX) 75 mg tablet Take 1 tablet (75 mg total) by mouth once daily. 90 tablet 3    diclofenac sodium (VOLTAREN) 1 % Gel Apply 2 g topically 4 (four) times daily. 300 g 11    metoprolol tartrate (LOPRESSOR) 25 MG tablet Take 1 tablet (25 mg  total) by mouth 2 (two) times daily. 1 Tablet(s) Oral PRN Every day. 180 tablet 3    rosuvastatin (CRESTOR) 40 MG Tab Take 1 tablet (40 mg total) by mouth every evening. 30 tablet 11    tramadol (ULTRAM) 50 mg tablet Take 1 tablet (50 mg total) by mouth every 6 (six) hours as needed for Pain. 40 tablet 0    zolpidem (AMBIEN) 5 MG Tab Take 1 tablet (5 mg total) by mouth nightly as needed. 90 tablet 0     No current facility-administered medications for this visit.        Physical Exam   Constitutional: He is oriented to person, place, and time. He appears well-developed. No distress.   HENT:   Head: Normocephalic and atraumatic.   Eyes: EOM are normal. Pupils are equal, round, and reactive to light.   Neck: Normal range of motion. Neck supple.   Cardiovascular: Normal rate and regular rhythm.    Pulmonary/Chest: Effort normal and breath sounds normal.   Musculoskeletal: Normal range of motion.   Neurological: He is alert and oriented to person, place, and time.   Skin: Skin is warm and dry. No rash noted.   Psychiatric: He has a normal mood and affect. Thought content normal.   Nursing note and vitals reviewed.      Assessment:       1. BPPV (benign paroxysmal positional vertigo), unspecified laterality        Plan:   BPPV (benign paroxysmal positional vertigo), unspecified laterality  -     Ambulatory referral to ENT    he prefers not to take medication, refused Meclizine. Request to f/u with ENT    Return if symptoms worsen or fail to improve.

## 2017-09-12 ENCOUNTER — CLINICAL SUPPORT (OUTPATIENT)
Dept: FAMILY MEDICINE | Facility: CLINIC | Age: 67
End: 2017-09-12
Payer: MEDICARE

## 2017-09-12 VITALS — SYSTOLIC BLOOD PRESSURE: 132 MMHG | DIASTOLIC BLOOD PRESSURE: 68 MMHG | HEART RATE: 60 BPM

## 2017-09-12 DIAGNOSIS — I10 ESSENTIAL HYPERTENSION: Primary | ICD-10-CM

## 2017-09-12 PROCEDURE — 99499 UNLISTED E&M SERVICE: CPT | Mod: S$GLB,,, | Performed by: FAMILY MEDICINE

## 2017-09-12 PROCEDURE — 99999 PR PBB SHADOW E&M-EST. PATIENT-LVL II: CPT | Mod: PBBFAC,,,

## 2017-09-22 RX ORDER — CLOPIDOGREL BISULFATE 75 MG/1
TABLET ORAL
Qty: 90 TABLET | Refills: 3 | Status: SHIPPED | OUTPATIENT
Start: 2017-09-22 | End: 2018-01-02 | Stop reason: SDUPTHER

## 2017-09-27 RX ORDER — TRAMADOL HYDROCHLORIDE 50 MG/1
50 TABLET ORAL EVERY 6 HOURS PRN
Qty: 40 TABLET | Refills: 0 | Status: SHIPPED | OUTPATIENT
Start: 2017-09-27 | End: 2017-12-04 | Stop reason: SDUPTHER

## 2017-10-27 DIAGNOSIS — F51.01 PRIMARY INSOMNIA: ICD-10-CM

## 2017-10-27 RX ORDER — ZOLPIDEM TARTRATE 5 MG/1
TABLET ORAL
Qty: 90 TABLET | Refills: 0 | Status: SHIPPED | OUTPATIENT
Start: 2017-10-27 | End: 2018-02-21 | Stop reason: SDUPTHER

## 2017-12-04 ENCOUNTER — PATIENT MESSAGE (OUTPATIENT)
Dept: FAMILY MEDICINE | Facility: CLINIC | Age: 67
End: 2017-12-04

## 2017-12-04 RX ORDER — TRAMADOL HYDROCHLORIDE 50 MG/1
50 TABLET ORAL EVERY 6 HOURS PRN
Qty: 40 TABLET | Refills: 0 | Status: SHIPPED | OUTPATIENT
Start: 2017-12-04 | End: 2018-01-02 | Stop reason: SDUPTHER

## 2018-01-02 RX ORDER — CLOPIDOGREL BISULFATE 75 MG/1
75 TABLET ORAL DAILY
Qty: 90 TABLET | Refills: 0 | Status: SHIPPED | OUTPATIENT
Start: 2018-01-02 | End: 2018-09-06 | Stop reason: SDUPTHER

## 2018-01-02 RX ORDER — TRAMADOL HYDROCHLORIDE 50 MG/1
50 TABLET ORAL EVERY 6 HOURS PRN
Qty: 40 TABLET | Refills: 0 | Status: SHIPPED | OUTPATIENT
Start: 2018-01-02 | End: 2018-02-21 | Stop reason: SDUPTHER

## 2018-01-02 NOTE — TELEPHONE ENCOUNTER
I have refilled the patient's request for a schedule III or above narcotic x 1.  Notify the patient by phone that this type of prescription now need a face to face visit every 6 months.  Please schedule a visit for the patient with me or Pablo Saenz NP or SHAR Gar before the next refill is due.  Document your contact with the patient in the permanent notes section of the chart.

## 2018-01-08 ENCOUNTER — OFFICE VISIT (OUTPATIENT)
Dept: PODIATRY | Facility: CLINIC | Age: 68
End: 2018-01-08
Payer: MEDICARE

## 2018-01-08 VITALS — WEIGHT: 193.81 LBS | BODY MASS INDEX: 27.75 KG/M2 | HEIGHT: 70 IN

## 2018-01-08 DIAGNOSIS — M24.573 EQUINUS CONTRACTURE OF ANKLE: ICD-10-CM

## 2018-01-08 DIAGNOSIS — M77.51 RETROCALCANEAL BURSITIS (BACK OF HEEL), RIGHT: Primary | ICD-10-CM

## 2018-01-08 DIAGNOSIS — M89.8X7 RETROCALCANEAL EXOSTOSIS: ICD-10-CM

## 2018-01-08 PROCEDURE — 99999 PR PBB SHADOW E&M-EST. PATIENT-LVL III: CPT | Mod: PBBFAC,,, | Performed by: PODIATRIST

## 2018-01-08 PROCEDURE — 99213 OFFICE O/P EST LOW 20 MIN: CPT | Mod: S$GLB,,, | Performed by: PODIATRIST

## 2018-01-08 RX ORDER — AMLODIPINE BESYLATE 5 MG/1
5 TABLET ORAL DAILY
Refills: 3 | COMMUNITY
Start: 2017-12-12 | End: 2019-01-16

## 2018-01-08 RX ORDER — NEBIVOLOL HYDROCHLORIDE 5 MG/1
5 TABLET ORAL DAILY
Refills: 3 | COMMUNITY
Start: 2017-12-12 | End: 2019-06-26

## 2018-01-08 NOTE — PATIENT INSTRUCTIONS
- Perform stretching exercises, see handout for details, to address tightness of calf muscles.      - Recommend wearing supportive shoes or orthopedic sandals only.  Avoid barefoot walking, flip flops, and Crocs, as this will exacerbate current symptoms.      - Recommend wearing heel lifts.  Given both verbal and written information regarding this.    - Recommend icing the affected heel a minimum of 20 minutes daily.    - Recommend taking either Tylenol or Ibuprofen to address pain.     - Avoid high impact activities such as squatting, stooping, and running as these activities will exacerbate symptoms.      - May consider applying a topical analgesic (Voltaren) to help with pain symptoms.

## 2018-01-09 NOTE — PROGRESS NOTES
"Subjective:      Patient ID: Cruz Dover is a 67 y.o. male.    Chief Complaint: Heel Pain (Rt heel pain)  Patient presents to clinic with the new complaint of Rt. Posterior heel pain.  Relates an onset of symptoms > 1 month.  Describes pain as sharp and burning.  Symptoms are most prevalent with weight bearing after periods of rest.  Currently rates pain as a 2/10.  Symptoms are alleviated with rest and after "warming up" by walking.  Denies definitive trauma to the affected heel.  Admits to being active on his property.  Also, relates needing to upgrade his shoe gear, as his current shoes are unsupportive.   Denies any additional pedal complaints with today's exam.    Review of Systems   Constitution: Negative for chills and fever.   Cardiovascular: Negative for claudication and leg swelling.   Skin: Negative for color change and nail changes.   Musculoskeletal: Positive for myalgias. Negative for joint pain and joint swelling.   Neurological: Negative for numbness and paresthesias.   Psychiatric/Behavioral: Negative for altered mental status.           Objective:      Physical Exam   Constitutional: He is oriented to person, place, and time. He appears well-developed and well-nourished. No distress.   Cardiovascular:   Pulses:       Dorsalis pedis pulses are 2+ on the right side, and 2+ on the left side.        Posterior tibial pulses are 2+ on the right side, and 2+ on the left side.   CFT <3 seconds bilateral.  Pedal hair growth present bilateral.  No localized edema noted bilateral.  Toes are warm to touch bilateral.     Musculoskeletal: He exhibits tenderness. He exhibits no edema.   Muscle strength 5/5 in all muscle groups bilateral.  No pain with ROM about bilateral foot and ankle.  Prominent retrocalcaneal exostosis noted bilateral, with pain on palpation on the Rt. Side.  (-) for pain with palpation about the invested Achilles nor defect/dell noted.  Bilateral gastrocnemius equinus. Bilateral pes " planus foot type.  Bilateral hallux abducto valgus.  Bilateral semi-reducible contracture of toes 2-5.    Neurological: He is alert and oriented to person, place, and time. He has normal strength. No sensory deficit.   Light touch intact bilateral.    Skin: Skin is warm, dry and intact. No abrasion, no burn, no ecchymosis, no laceration, no lesion, no petechiae and no rash noted. He is not diaphoretic. No cyanosis or erythema. No pallor. Nails show no clubbing.   Pedal skin has normal turgor, temperature, and texture bilateral. No adjacent break in skin integrity nor skin discoloration noted. Toenails x 10 appear normotrophic. Examination of the skin reveals no evidence of significant maceration, rashes, open lesions, suspicious appearing nevi or other concerning lesions.              Assessment:       Encounter Diagnoses   Name Primary?    Retrocalcaneal bursitis (back of heel), right Yes    Retrocalcaneal exostosis     Equinus contracture of ankle          Plan:       Cruz was seen today for heel pain.    Diagnoses and all orders for this visit:    Retrocalcaneal bursitis (back of heel), right    Retrocalcaneal exostosis    Equinus contracture of ankle      I counseled the patient on his conditions, their implications and medical management.    - Discussed performing stretching exercises to address the equinus.    - Recommend wearing supportive shoes and to avoid barefoot walking, flip flops, and Crocs, as this will exacerbate current symptoms.      - Recommend wearing heel lifts.  Given both verbal and written information regarding this.    - Recommend icing the affected heel a minimum of 20 minutes daily.    - Recommend taking either Tylenol or Ibuprofen to address pain.     - May consider applying a topical analgesic (existing prescription for Voltaren) to help with pain symptoms.      - RTC prn.    Jose Rafael Patel DPM

## 2018-01-10 ENCOUNTER — PES CALL (OUTPATIENT)
Dept: ADMINISTRATIVE | Facility: CLINIC | Age: 68
End: 2018-01-10

## 2018-01-10 ENCOUNTER — PATIENT MESSAGE (OUTPATIENT)
Dept: FAMILY MEDICINE | Facility: CLINIC | Age: 68
End: 2018-01-10

## 2018-01-10 DIAGNOSIS — I10 ESSENTIAL HYPERTENSION: Primary | ICD-10-CM

## 2018-01-10 DIAGNOSIS — E78.5 HYPERLIPIDEMIA, UNSPECIFIED HYPERLIPIDEMIA TYPE: ICD-10-CM

## 2018-01-10 NOTE — TELEPHONE ENCOUNTER
I have signed for the following orders AND/OR meds.  Please call the patient and ask the patient to schedule the testing AND/OR inform about any medications that were sent.      Orders Placed This Encounter   Procedures    Comprehensive metabolic panel     Standing Status:   Future     Standing Expiration Date:   1/10/2019    Lipid panel     Standing Status:   Future     Standing Expiration Date:   1/10/2019

## 2018-02-05 ENCOUNTER — LAB VISIT (OUTPATIENT)
Dept: LAB | Facility: HOSPITAL | Age: 68
End: 2018-02-05
Attending: FAMILY MEDICINE
Payer: MEDICARE

## 2018-02-05 DIAGNOSIS — E78.5 HYPERLIPIDEMIA, UNSPECIFIED HYPERLIPIDEMIA TYPE: ICD-10-CM

## 2018-02-05 DIAGNOSIS — I10 ESSENTIAL HYPERTENSION: ICD-10-CM

## 2018-02-05 LAB
ALBUMIN SERPL BCP-MCNC: 3.6 G/DL
ALP SERPL-CCNC: 103 U/L
ALT SERPL W/O P-5'-P-CCNC: 46 U/L
ANION GAP SERPL CALC-SCNC: 7 MMOL/L
AST SERPL-CCNC: 38 U/L
BILIRUB SERPL-MCNC: 0.3 MG/DL
BUN SERPL-MCNC: 14 MG/DL
CALCIUM SERPL-MCNC: 9.2 MG/DL
CHLORIDE SERPL-SCNC: 108 MMOL/L
CHOLEST SERPL-MCNC: 124 MG/DL
CHOLEST/HDLC SERPL: 2.8 {RATIO}
CO2 SERPL-SCNC: 25 MMOL/L
CREAT SERPL-MCNC: 0.8 MG/DL
EST. GFR  (AFRICAN AMERICAN): >60 ML/MIN/1.73 M^2
EST. GFR  (NON AFRICAN AMERICAN): >60 ML/MIN/1.73 M^2
GLUCOSE SERPL-MCNC: 112 MG/DL
HDLC SERPL-MCNC: 45 MG/DL
HDLC SERPL: 36.3 %
LDLC SERPL CALC-MCNC: 66.2 MG/DL
NONHDLC SERPL-MCNC: 79 MG/DL
POTASSIUM SERPL-SCNC: 4.4 MMOL/L
PROT SERPL-MCNC: 7.2 G/DL
SODIUM SERPL-SCNC: 140 MMOL/L
TRIGL SERPL-MCNC: 64 MG/DL

## 2018-02-05 PROCEDURE — 80053 COMPREHEN METABOLIC PANEL: CPT

## 2018-02-05 PROCEDURE — 36415 COLL VENOUS BLD VENIPUNCTURE: CPT | Mod: PO

## 2018-02-05 PROCEDURE — 80061 LIPID PANEL: CPT

## 2018-02-06 NOTE — PROGRESS NOTES
THE CHOLESTEROL PANEL is fine.  Recheck that in 1 year and continue meds.     However, the glucose is high. The patient has a high fasting glucose and it may be a borderline diabetic.  Please schedule the patient for a 2 hour glucose challenge along with a hemoglobin A1c.  Schedule the patient to followup with my NP 1 week after the testing is done.

## 2018-02-08 ENCOUNTER — TELEPHONE (OUTPATIENT)
Dept: FAMILY MEDICINE | Facility: CLINIC | Age: 68
End: 2018-02-08

## 2018-02-08 NOTE — TELEPHONE ENCOUNTER
----- Message from Binh Cat MD sent at 2/6/2018  3:26 PM CST -----  THE CHOLESTEROL PANEL is fine.  Recheck that in 1 year and continue meds.     However, the glucose is high. The patient has a high fasting glucose and it may be a borderline diabetic.  Please schedule the patient for a 2 hour glucose challenge along with a hemoglobin A1c.  Schedule the patient to followup with my NP 1 week after the testing is done.

## 2018-02-08 NOTE — LETTER
February 8, 2018    Cruz SETH Stanislaw  [id number] [facility] [id number]             [address] [address]  Phone: [phone]  Fax: [phone] Dear Mr. Dover:    I have been unable to contact you regarding your results. Please contact our office at [phone].      Domenica Reyes LPN

## 2018-02-19 DIAGNOSIS — F51.01 PRIMARY INSOMNIA: ICD-10-CM

## 2018-02-19 RX ORDER — ZOLPIDEM TARTRATE 5 MG/1
TABLET ORAL
Qty: 90 TABLET | Refills: 0 | OUTPATIENT
Start: 2018-02-19

## 2018-02-19 RX ORDER — TRAMADOL HYDROCHLORIDE 50 MG/1
50 TABLET ORAL EVERY 6 HOURS PRN
Qty: 40 TABLET | Refills: 0 | OUTPATIENT
Start: 2018-02-19

## 2018-02-21 ENCOUNTER — OFFICE VISIT (OUTPATIENT)
Dept: FAMILY MEDICINE | Facility: CLINIC | Age: 68
End: 2018-02-21
Payer: MEDICARE

## 2018-02-21 ENCOUNTER — TELEPHONE (OUTPATIENT)
Dept: FAMILY MEDICINE | Facility: CLINIC | Age: 68
End: 2018-02-21

## 2018-02-21 VITALS
TEMPERATURE: 98 F | HEIGHT: 70 IN | SYSTOLIC BLOOD PRESSURE: 129 MMHG | BODY MASS INDEX: 28.63 KG/M2 | DIASTOLIC BLOOD PRESSURE: 65 MMHG | HEART RATE: 53 BPM | WEIGHT: 200 LBS

## 2018-02-21 DIAGNOSIS — R73.01 ELEVATED FASTING GLUCOSE: Primary | ICD-10-CM

## 2018-02-21 DIAGNOSIS — F51.01 PRIMARY INSOMNIA: ICD-10-CM

## 2018-02-21 DIAGNOSIS — I10 ESSENTIAL HYPERTENSION: ICD-10-CM

## 2018-02-21 DIAGNOSIS — M25.551 RIGHT HIP PAIN: ICD-10-CM

## 2018-02-21 DIAGNOSIS — D68.9 CLOTTING DISORDER: ICD-10-CM

## 2018-02-21 PROCEDURE — 1126F AMNT PAIN NOTED NONE PRSNT: CPT | Mod: S$GLB,,, | Performed by: FAMILY MEDICINE

## 2018-02-21 PROCEDURE — 99213 OFFICE O/P EST LOW 20 MIN: CPT | Mod: S$GLB,,, | Performed by: FAMILY MEDICINE

## 2018-02-21 PROCEDURE — 99999 PR PBB SHADOW E&M-EST. PATIENT-LVL III: CPT | Mod: PBBFAC,,, | Performed by: FAMILY MEDICINE

## 2018-02-21 PROCEDURE — 1159F MED LIST DOCD IN RCRD: CPT | Mod: S$GLB,,, | Performed by: FAMILY MEDICINE

## 2018-02-21 PROCEDURE — 99499 UNLISTED E&M SERVICE: CPT | Mod: S$GLB,,, | Performed by: FAMILY MEDICINE

## 2018-02-21 PROCEDURE — 3008F BODY MASS INDEX DOCD: CPT | Mod: S$GLB,,, | Performed by: FAMILY MEDICINE

## 2018-02-21 RX ORDER — TRAMADOL HYDROCHLORIDE 50 MG/1
50 TABLET ORAL EVERY 6 HOURS PRN
Qty: 40 TABLET | Refills: 1 | Status: SHIPPED | OUTPATIENT
Start: 2018-02-21 | End: 2018-04-23 | Stop reason: SDUPTHER

## 2018-02-21 RX ORDER — ZOLPIDEM TARTRATE 5 MG/1
TABLET ORAL
Qty: 90 TABLET | Refills: 1 | Status: SHIPPED | OUTPATIENT
Start: 2018-02-21 | End: 2018-09-06 | Stop reason: SDUPTHER

## 2018-02-21 NOTE — PROGRESS NOTES
Subjective:      Patient ID: Cruz Dover is a 67 y.o. male.    Chief Complaint: Follow-up    Problem List Items Addressed This Visit     Clotting disorder    Overview     He has a clotting disorder for which he has had to use plavix.  He has not had bleeding noted with the medicine and he has not had any clotting event on this med.         Hypertension    Overview     The patient presents with essential hypertension.  The patient is tolerating the medication well and is in excellent compliance.  The patient is experiencing no side effects.  Counseling was offered regarding low salt diets.  The patient has a reduced salt intake.  The patient denies chest pain, palpitations, shortness of breath, dyspnea on exertion, left or murmur neck pain, nausea, vomiting, diaphoresis, paroxysmal nocturnal dyspnea, and orthopnea.   Hypertension Medications             amLODIPine (NORVASC) 5 MG tablet Take 5 mg by mouth once daily.    BYSTOLIC 5 mg Tab Take 5 mg by mouth once daily.    metoprolol tartrate (LOPRESSOR) 25 MG tablet Take 1 tablet (25 mg total) by mouth 2 (two) times daily. 1 Tablet(s) Oral PRN Every day.                 Primary insomnia    Overview     He has insomnia.  His problem is more of a problem with not being able to go back to sleep when he awakens in the middle of the night.  He has not had problems on the ambien.         Right hip pain    Overview     He has a chronic pain of the right hip and he has been on ultram for this. He has been on it chronically and he has not had to increase the dose.   He uses it several times a week for this.  He also has had chronic knee pain and it helps with that when he uses it.                 Past Medical History:  Past Medical History:   Diagnosis Date    Anemia     Colon polyps 8/14/2012    adenomatous-recheck in 2017    Diverticulosis     Hyperlipidemia LDL goal < 100     Hypertension     Primary insomnia 2/21/2018    He has insomnia.  His problem is more of a  problem with not being able to go back to sleep when he awakens in the middle of the night.  He has not had problems on the ambien.     Past Surgical History:   Procedure Laterality Date    cardiac bypass      CARPAL TUNNEL RELEASE      COLONOSCOPY  2012    CORONARY ARTERY BYPASS GRAFT      HERNIA MESH REMOVAL      HERNIA REPAIR      NASAL SEPTUM SURGERY      septoplasty and SMR turbinoplasty     ID COLONOSCOPY,SHIKHA MONTESINOS/CAUTERY  8/14/2012         VASECTOMY      VASECTOMY       Review of patient's allergies indicates:   Allergen Reactions    Penicillin g     Penicillins      Other reaction(s): Unknown     Current Outpatient Prescriptions on File Prior to Visit   Medication Sig Dispense Refill    amLODIPine (NORVASC) 5 MG tablet Take 5 mg by mouth once daily.  3    aspirin 81 MG chewable tablet 81mg Oral PRN .        BYSTOLIC 5 mg Tab Take 5 mg by mouth once daily.  3    clopidogrel (PLAVIX) 75 mg tablet Take 1 tablet (75 mg total) by mouth once daily. 90 tablet 0    diclofenac sodium (VOLTAREN) 1 % Gel Apply 2 g topically 4 (four) times daily. 300 g 11    metoprolol tartrate (LOPRESSOR) 25 MG tablet Take 1 tablet (25 mg total) by mouth 2 (two) times daily. 1 Tablet(s) Oral PRN Every day. 180 tablet 3    rosuvastatin (CRESTOR) 40 MG Tab Take 1 tablet (40 mg total) by mouth every evening. 30 tablet 11    traMADol (ULTRAM) 50 mg tablet Take 1 tablet (50 mg total) by mouth every 6 (six) hours as needed for Pain. 40 tablet 0    zolpidem (AMBIEN) 5 MG Tab TAKE ONE TABLET BY MOUTH ONCE DAILY at night AS NEEDED 90 tablet 0    [DISCONTINUED] alprazolam (XANAX) 0.5 MG tablet Take 1 tablet (0.5 mg total) by mouth 3 (three) times daily. 270 tablet 1     No current facility-administered medications on file prior to visit.      Social History     Social History    Marital status:      Spouse name: N/A    Number of children: N/A    Years of education: N/A     Occupational History    Not on  "file.     Social History Main Topics    Smoking status: Former Smoker     Quit date: 2/10/2008    Smokeless tobacco: Never Used    Alcohol use 1.8 oz/week     1 Glasses of wine, 2 Cans of beer per week      Comment: 2/day    Drug use: No    Sexual activity: Yes     Partners: Female     Other Topics Concern    Not on file     Social History Narrative    No narrative on file     Family History   Problem Relation Age of Onset    Diabetes Mother     Cancer Mother     Diabetes Sister     Cancer Paternal Uncle     Colon cancer Neg Hx      There are no preventive care reminders to display for this patient.    Review of Systems    Objective:     /65   Pulse (!) 53   Temp 98 °F (36.7 °C) (Oral)   Ht 5' 10" (1.778 m)   Wt 90.7 kg (200 lb)   BMI 28.70 kg/m²     Physical Exam   Constitutional: He appears well-developed and well-nourished.   Cardiovascular: Normal rate, regular rhythm and normal heart sounds.  Exam reveals no gallop and no friction rub.    No murmur heard.  Pulmonary/Chest: Effort normal and breath sounds normal. No respiratory distress. He has no wheezes. He has no rales.   Musculoskeletal: He exhibits no edema.        Right hip: He exhibits decreased range of motion. He exhibits normal strength, no tenderness, no bony tenderness and no swelling.        Right knee: He exhibits normal range of motion. No tenderness found.        Left knee: He exhibits normal range of motion. No tenderness found.   Neurological: He is alert.     Assessment:     1. Right hip pain    2. Essential hypertension    3. Primary insomnia    4. Clotting disorder        Plan:     Problem List Items Addressed This Visit     Clotting disorder    Hypertension    Primary insomnia    Relevant Medications    zolpidem (AMBIEN) 5 MG Tab    Right hip pain    Relevant Medications    traMADol (ULTRAM) 50 mg tablet      rtc in 6 months.  No Follow-up on file.  "

## 2018-02-21 NOTE — TELEPHONE ENCOUNTER
I have signed for the following orders AND/OR meds.  Please call the patient and ask the patient to schedule the testing AND/OR inform about any medications that were sent.      Orders Placed This Encounter   Procedures    Glucose Tolerance 2 Hour     Standing Status:   Future     Standing Expiration Date:   4/22/2019    Hemoglobin A1c     Standing Status:   Future     Standing Expiration Date:   4/22/2019

## 2018-03-06 ENCOUNTER — OFFICE VISIT (OUTPATIENT)
Dept: FAMILY MEDICINE | Facility: CLINIC | Age: 68
End: 2018-03-06
Payer: MEDICARE

## 2018-03-06 VITALS
WEIGHT: 202.38 LBS | BODY MASS INDEX: 28.97 KG/M2 | TEMPERATURE: 98 F | HEART RATE: 57 BPM | SYSTOLIC BLOOD PRESSURE: 137 MMHG | DIASTOLIC BLOOD PRESSURE: 77 MMHG | HEIGHT: 70 IN

## 2018-03-06 DIAGNOSIS — D68.9 CLOTTING DISORDER: ICD-10-CM

## 2018-03-06 DIAGNOSIS — F51.01 PRIMARY INSOMNIA: ICD-10-CM

## 2018-03-06 DIAGNOSIS — Z12.5 ENCOUNTER FOR SCREENING FOR MALIGNANT NEOPLASM OF PROSTATE: ICD-10-CM

## 2018-03-06 DIAGNOSIS — R73.01 ELEVATED FASTING GLUCOSE: ICD-10-CM

## 2018-03-06 DIAGNOSIS — I10 ESSENTIAL HYPERTENSION: ICD-10-CM

## 2018-03-06 DIAGNOSIS — J31.0 OTHER CHRONIC RHINITIS: ICD-10-CM

## 2018-03-06 DIAGNOSIS — J34.89 NASAL SEPTAL PERFORATION: ICD-10-CM

## 2018-03-06 DIAGNOSIS — Z00.00 ANNUAL PHYSICAL EXAM: Primary | ICD-10-CM

## 2018-03-06 DIAGNOSIS — E78.5 HYPERLIPIDEMIA, UNSPECIFIED HYPERLIPIDEMIA TYPE: ICD-10-CM

## 2018-03-06 DIAGNOSIS — D64.9 ANEMIA, UNSPECIFIED TYPE: ICD-10-CM

## 2018-03-06 PROCEDURE — 99397 PER PM REEVAL EST PAT 65+ YR: CPT | Mod: S$GLB,,, | Performed by: NURSE PRACTITIONER

## 2018-03-06 PROCEDURE — 99499 UNLISTED E&M SERVICE: CPT | Mod: S$GLB,,, | Performed by: NURSE PRACTITIONER

## 2018-03-06 PROCEDURE — 99999 PR PBB SHADOW E&M-EST. PATIENT-LVL IV: CPT | Mod: PBBFAC,,, | Performed by: NURSE PRACTITIONER

## 2018-03-06 NOTE — PROGRESS NOTES
Subjective:       Patient ID: Cruz Dover is a 67 y.o. male.    Chief Complaint: Annual Exam  Pt in today for annual exam. HTN well controlled with medication; sees cardiology; history of CABG; taking plavix. Pt states cardiologist discontinued metoprolol at last visit; scheduled to follow up on 3/12/18. Hyperlipidemia managed with medication. Labs UTD. Glucose was elevated: PCP ordered glucose tolerance test, Hgb a1c. Declines prostate exam. Colonoscopy, influenza vaccine, pneumonia vaccine UTD.  Pt also requests to reschedule ENT appointment; has chronic rhinitis, nasal septal perforation; states has appt but had to cancel due to weather. Insomnia well managed with medication. Pt has no other complaints today.  Past Medical History:   Diagnosis Date    Anemia     Colon polyps 8/14/2012    adenomatous-recheck in 2017    Diverticulosis     Hyperlipidemia LDL goal < 100     Hypertension     Primary insomnia 2/21/2018    He has insomnia.  His problem is more of a problem with not being able to go back to sleep when he awakens in the middle of the night.  He has not had problems on the ambien.     Social History     Social History    Marital status:      Spouse name: N/A    Number of children: N/A    Years of education: N/A     Occupational History    Not on file.     Social History Main Topics    Smoking status: Former Smoker     Quit date: 2/10/2008    Smokeless tobacco: Never Used    Alcohol use 1.8 oz/week     1 Glasses of wine, 2 Cans of beer per week      Comment: 2/day    Drug use: No    Sexual activity: Yes     Partners: Female     Social History Narrative    No narrative on file     Past Surgical History:   Procedure Laterality Date    cardiac bypass      CARPAL TUNNEL RELEASE      COLONOSCOPY  2012    CORONARY ARTERY BYPASS GRAFT      HERNIA MESH REMOVAL      HERNIA REPAIR      NASAL SEPTUM SURGERY      septoplasty and SMR turbinoplasty     [place] COLONOSCOPY,REMV  SHIKHA SUÁREZ/MADALYN  8/14/2012         VASECTOMY      VASECTOMY         HPI  Review of Systems   Constitutional: Negative.  Negative for activity change and unexpected weight change.   HENT: Negative.  Negative for hearing loss, rhinorrhea and trouble swallowing.    Eyes: Negative.  Negative for discharge and visual disturbance.   Respiratory: Negative.  Negative for chest tightness and wheezing.    Cardiovascular: Negative.  Negative for chest pain and palpitations.   Gastrointestinal: Negative.  Negative for blood in stool, constipation, diarrhea and vomiting.   Endocrine: Negative.  Negative for polyuria.   Genitourinary: Negative.  Negative for difficulty urinating, hematuria and urgency.   Musculoskeletal: Negative.  Negative for joint swelling and neck pain.   Skin: Negative.    Allergic/Immunologic: Negative.    Neurological: Negative.  Negative for weakness and headaches.   Psychiatric/Behavioral: Negative.  Negative for confusion and dysphoric mood.       Objective:      Physical Exam   Constitutional: He is oriented to person, place, and time. He appears well-developed and well-nourished.   HENT:   Head: Normocephalic.   Right Ear: External ear normal.   Left Ear: External ear normal.   Nose: Nose normal.   Mouth/Throat: Oropharynx is clear and moist.   Eyes: Conjunctivae are normal. Pupils are equal, round, and reactive to light.   Neck: Normal range of motion. Neck supple.   Cardiovascular: Normal rate, regular rhythm and normal heart sounds.    Pulmonary/Chest: Effort normal and breath sounds normal.   Abdominal: Soft. Bowel sounds are normal.   Musculoskeletal: Normal range of motion.   Neurological: He is alert and oriented to person, place, and time.   Skin: Skin is warm and dry. Capillary refill takes 2 to 3 seconds.   Psychiatric: He has a normal mood and affect. His behavior is normal. Judgment and thought content normal.   Nursing note and vitals reviewed.      Assessment:       1. Annual  physical exam    2. Essential hypertension    3. Hyperlipidemia, unspecified hyperlipidemia type    4. Anemia, unspecified type    5. Clotting disorder    6. Primary insomnia    7. Elevated fasting glucose    8. Encounter for screening for malignant neoplasm of prostate     9. Other chronic rhinitis    10. Nasal septal perforation        Plan:           Cruz was seen today for annual exam.    Diagnoses and all orders for this visit:    Annual physical exam  Essential hypertension  Hyperlipidemia, unspecified hyperlipidemia type  Anemia, unspecified type  Clotting disorder  Primary insomnia  Elevated fasting glucose  Encounter for screening for malignant neoplasm of prostate   -     PSA, Screening; Future  -     TSH; Future        Hgb A1C, 2 hour glucose tolerance test        CMP, lipid UTD    Other chronic rhinitis  Nasal septal perforation  -     Ambulatory referral to ENT

## 2018-03-08 ENCOUNTER — PES CALL (OUTPATIENT)
Dept: ADMINISTRATIVE | Facility: CLINIC | Age: 68
End: 2018-03-08

## 2018-03-09 ENCOUNTER — LAB VISIT (OUTPATIENT)
Dept: LAB | Facility: HOSPITAL | Age: 68
End: 2018-03-09
Attending: NURSE PRACTITIONER
Payer: MEDICARE

## 2018-03-09 DIAGNOSIS — I10 ESSENTIAL HYPERTENSION: ICD-10-CM

## 2018-03-09 DIAGNOSIS — Z00.00 ANNUAL PHYSICAL EXAM: ICD-10-CM

## 2018-03-09 DIAGNOSIS — R73.01 ELEVATED FASTING GLUCOSE: ICD-10-CM

## 2018-03-09 DIAGNOSIS — Z12.5 ENCOUNTER FOR SCREENING FOR MALIGNANT NEOPLASM OF PROSTATE: ICD-10-CM

## 2018-03-09 LAB
COMPLEXED PSA SERPL-MCNC: 0.22 NG/ML
ESTIMATED AVG GLUCOSE: 114 MG/DL
GLUCOSE SERPL-MCNC: 142 MG/DL
GLUCOSE SERPL-MCNC: 288 MG/DL
GLUCOSE SERPL-MCNC: 99 MG/DL
HBA1C MFR BLD HPLC: 5.6 %
TSH SERPL DL<=0.005 MIU/L-ACNC: 1.04 UIU/ML

## 2018-03-09 PROCEDURE — 36415 COLL VENOUS BLD VENIPUNCTURE: CPT | Mod: PO

## 2018-03-09 PROCEDURE — 84443 ASSAY THYROID STIM HORMONE: CPT

## 2018-03-09 PROCEDURE — 82951 GLUCOSE TOLERANCE TEST (GTT): CPT

## 2018-03-09 PROCEDURE — 83036 HEMOGLOBIN GLYCOSYLATED A1C: CPT

## 2018-03-09 PROCEDURE — 84153 ASSAY OF PSA TOTAL: CPT

## 2018-03-12 NOTE — PROGRESS NOTES
The 2 hour glucose challenge is negative for signs of diabetes.  This is good.  Please continue to keep the weight as low as possible and exercise.  Avoid simple sugars.  Repeat a glucose test in one year at your physical.

## 2018-03-16 ENCOUNTER — OFFICE VISIT (OUTPATIENT)
Dept: FAMILY MEDICINE | Facility: CLINIC | Age: 68
End: 2018-03-16
Payer: MEDICARE

## 2018-03-16 VITALS
HEIGHT: 70 IN | WEIGHT: 199.75 LBS | DIASTOLIC BLOOD PRESSURE: 64 MMHG | SYSTOLIC BLOOD PRESSURE: 133 MMHG | HEART RATE: 55 BPM | BODY MASS INDEX: 28.6 KG/M2

## 2018-03-16 DIAGNOSIS — Z01.89 ENCOUNTER FOR LABORATORY EXAMINATION: Primary | ICD-10-CM

## 2018-03-16 PROCEDURE — 99999 PR PBB SHADOW E&M-EST. PATIENT-LVL III: CPT | Mod: PBBFAC,,, | Performed by: NURSE PRACTITIONER

## 2018-03-16 PROCEDURE — 3075F SYST BP GE 130 - 139MM HG: CPT | Mod: CPTII,S$GLB,, | Performed by: NURSE PRACTITIONER

## 2018-03-16 PROCEDURE — 3078F DIAST BP <80 MM HG: CPT | Mod: CPTII,S$GLB,, | Performed by: NURSE PRACTITIONER

## 2018-03-16 PROCEDURE — 99212 OFFICE O/P EST SF 10 MIN: CPT | Mod: S$GLB,,, | Performed by: NURSE PRACTITIONER

## 2018-03-16 NOTE — PROGRESS NOTES
Subjective:       Patient ID: Cruz Dover is a 67 y.o. male.    Chief Complaint: No chief complaint on file.  Pt in today for lab review. Results of 2 hour glucose tolerance test, hgb a1c, PSA, TSH explained. Pt informed tests negative for diabetes diagnosis; education provided related to weight management, diet, avoidance of simple sugars; verbalized understanding. PSA, TSH WNL. Pt has no other complaints today.  Past Medical History:   Diagnosis Date    Anemia     Colon polyps 8/14/2012    adenomatous-recheck in 2017    Diverticulosis     Hyperlipidemia LDL goal < 100     Hypertension     Primary insomnia 2/21/2018    He has insomnia.  His problem is more of a problem with not being able to go back to sleep when he awakens in the middle of the night.  He has not had problems on the ambien.     Social History     Social History    Marital status:      Spouse name: N/A    Number of children: N/A    Years of education: N/A     Occupational History    Not on file.     Social History Main Topics    Smoking status: Former Smoker     Quit date: 2/10/2008    Smokeless tobacco: Never Used    Alcohol use 1.8 oz/week     1 Glasses of wine, 2 Cans of beer per week      Comment: 2/day    Drug use: No    Sexual activity: Yes     Partners: Female     Social History Narrative    No narrative on file     Past Surgical History:   Procedure Laterality Date    cardiac bypass      CARPAL TUNNEL RELEASE      COLONOSCOPY  2012    CORONARY ARTERY BYPASS GRAFT      HERNIA MESH REMOVAL      HERNIA REPAIR      NASAL SEPTUM SURGERY      septoplasty and SMR turbinoplasty     [place] COLONOSCOPY,REMSHIKHA PHILLIPS/CAUTERY  8/14/2012         VASECTOMY      VASECTOMY         HPI  Review of Systems   Constitutional: Negative.  Negative for activity change and unexpected weight change.   HENT: Negative.  Negative for hearing loss, rhinorrhea and trouble swallowing.    Eyes: Negative.  Negative for discharge and visual  disturbance.   Respiratory: Negative.  Negative for chest tightness and wheezing.    Cardiovascular: Negative.  Negative for chest pain and palpitations.   Gastrointestinal: Negative.  Negative for blood in stool, constipation, diarrhea and vomiting.   Endocrine: Negative.  Negative for polydipsia and polyuria.   Genitourinary: Negative.  Negative for difficulty urinating, hematuria and urgency.   Musculoskeletal: Negative.  Negative for arthralgias, joint swelling and neck pain.   Skin: Negative.    Allergic/Immunologic: Negative.    Neurological: Negative.  Negative for weakness and headaches.   Psychiatric/Behavioral: Negative.  Negative for confusion and dysphoric mood.       Objective:      Physical Exam   Constitutional: He is oriented to person, place, and time. He appears well-developed and well-nourished.   HENT:   Head: Normocephalic.   Right Ear: External ear normal.   Left Ear: External ear normal.   Nose: Nose normal.   Mouth/Throat: Oropharynx is clear and moist.   Eyes: Conjunctivae are normal. Pupils are equal, round, and reactive to light.   Neck: Normal range of motion. Neck supple.   Cardiovascular: Normal rate, regular rhythm and normal heart sounds.    Pulmonary/Chest: Effort normal and breath sounds normal.   Abdominal: Soft. Bowel sounds are normal.   Musculoskeletal: Normal range of motion.   Neurological: He is alert and oriented to person, place, and time.   Skin: Skin is warm and dry. Capillary refill takes 2 to 3 seconds.   Psychiatric: He has a normal mood and affect. His behavior is normal. Judgment and thought content normal.   Nursing note and vitals reviewed.      Assessment:       1. Encounter for laboratory examination        Plan:           Diagnoses and all orders for this visit:    Encounter for laboratory examination  Continue current medications  Annual exam in 1 y

## 2018-03-19 ENCOUNTER — PATIENT MESSAGE (OUTPATIENT)
Dept: FAMILY MEDICINE | Facility: CLINIC | Age: 68
End: 2018-03-19

## 2018-03-21 ENCOUNTER — PATIENT MESSAGE (OUTPATIENT)
Dept: OTOLARYNGOLOGY | Facility: CLINIC | Age: 68
End: 2018-03-21

## 2018-03-27 ENCOUNTER — OFFICE VISIT (OUTPATIENT)
Dept: OTOLARYNGOLOGY | Facility: CLINIC | Age: 68
End: 2018-03-27
Payer: MEDICARE

## 2018-03-27 ENCOUNTER — CLINICAL SUPPORT (OUTPATIENT)
Dept: AUDIOLOGY | Facility: CLINIC | Age: 68
End: 2018-03-27
Payer: MEDICARE

## 2018-03-27 VITALS
DIASTOLIC BLOOD PRESSURE: 65 MMHG | HEART RATE: 50 BPM | WEIGHT: 199 LBS | SYSTOLIC BLOOD PRESSURE: 115 MMHG | BODY MASS INDEX: 28.55 KG/M2

## 2018-03-27 DIAGNOSIS — J34.89 NASAL OBSTRUCTION: ICD-10-CM

## 2018-03-27 DIAGNOSIS — J34.89 NASAL SEPTAL PERFORATION: Primary | ICD-10-CM

## 2018-03-27 DIAGNOSIS — H90.3 SENSORINEURAL HEARING LOSS (SNHL) OF BOTH EARS: ICD-10-CM

## 2018-03-27 DIAGNOSIS — H90.3 SENSORY HEARING LOSS, BILATERAL: Primary | ICD-10-CM

## 2018-03-27 PROCEDURE — 99999 PR PBB SHADOW E&M-EST. PATIENT-LVL III: CPT | Mod: PBBFAC,,, | Performed by: ORTHOPAEDIC SURGERY

## 2018-03-27 PROCEDURE — 92557 COMPREHENSIVE HEARING TEST: CPT | Mod: S$GLB,,, | Performed by: AUDIOLOGIST

## 2018-03-27 PROCEDURE — 99214 OFFICE O/P EST MOD 30 MIN: CPT | Mod: S$GLB,,, | Performed by: ORTHOPAEDIC SURGERY

## 2018-03-27 PROCEDURE — 3078F DIAST BP <80 MM HG: CPT | Mod: CPTII,S$GLB,, | Performed by: ORTHOPAEDIC SURGERY

## 2018-03-27 PROCEDURE — 3074F SYST BP LT 130 MM HG: CPT | Mod: CPTII,S$GLB,, | Performed by: ORTHOPAEDIC SURGERY

## 2018-03-27 PROCEDURE — 92567 TYMPANOMETRY: CPT | Mod: S$GLB,,, | Performed by: AUDIOLOGIST

## 2018-03-27 NOTE — PROGRESS NOTES
Subjective:       Patient ID: Cruz Dover is a 67 y.o. male.    Chief Complaint: Consult; Sinus Problem; and Epistaxis    Patient is a very pleasant 67 y.o. male here to see me today for the first time for evaluation of hearing loss.  He reports hearing loss that has been gradually progressing over the last 10 years.  He has noted any difference in hearing between the ears, with either ear being the better hearing ear.  He has not noted any tinnitus in either ear.  He has not had any recent issues with ear pain or ear drainage.  He denies a family history of hearing loss, and has not had any previous otologic surgery.  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  He denies issues with dizziness.  Second, he has an issue with a perforation in his nasal septum, first seen in 2007 (from his old records).  He has a history of a septoplasty in 2005 with Dr. Mg.  He was a smoker, quit in 2010, prior to that smoked about 1/2 pack daily for 40+ years.  Since he has developed a septal perforation he has had issues with persistent epistaxis.  He has been using Pronase (an oily medication) to his nose, and that has helped with his epistaxis.  He has increasing issues with nasal congestion and obstruction.  He also had two SMRT with Dr. Mg, 2005 and 2007.  He has a history of a CABG in 2011, and has been on a blood thinner since that time.  He is not currently using any decongestant nasal spray (though he was at the time of the diagnosis of his perforation).        Review of Systems   Constitutional: Negative for fatigue, fever and unexpected weight change.   HENT: Positive for congestion, hearing loss, postnasal drip and rhinorrhea. Negative for ear discharge, ear pain, facial swelling, nosebleeds, sinus pressure, sneezing, sore throat, tinnitus, trouble swallowing and voice change.    Eyes: Negative for discharge, redness and itching.   Respiratory: Negative for  cough, choking, shortness of breath and wheezing.    Cardiovascular: Negative for chest pain and palpitations.   Gastrointestinal: Negative for abdominal pain.        No reflux.   Musculoskeletal: Negative for neck pain.   Neurological: Negative for dizziness, facial asymmetry, light-headedness and headaches.   Hematological: Negative for adenopathy. Does not bruise/bleed easily.   Psychiatric/Behavioral: Negative for agitation, behavioral problems, confusion and decreased concentration.       Objective:      Physical Exam   Constitutional: He is oriented to person, place, and time. Vital signs are normal. He appears well-developed and well-nourished. No distress.   HENT:   Head: Normocephalic and atraumatic.   Right Ear: Hearing, tympanic membrane, external ear and ear canal normal.   Left Ear: Hearing, tympanic membrane, external ear and ear canal normal.   Nose: Mucosal edema, rhinorrhea and nasal deformity present. No septal deviation.   Mouth/Throat: Uvula is midline, oropharynx is clear and moist and mucous membranes are normal. No trismus in the jaw. Normal dentition. No uvula swelling. No oropharyngeal exudate or posterior oropharyngeal edema.   Septal perforation, relatively large   Eyes: Conjunctivae and EOM are normal. Pupils are equal, round, and reactive to light. Right eye exhibits no chemosis. Left eye exhibits no chemosis. Right conjunctiva is not injected. Left conjunctiva is not injected. No scleral icterus.   Neck: Trachea normal and phonation normal. No tracheal tenderness present. No tracheal deviation present. No thyroid mass and no thyromegaly present.   Cardiovascular: Intact distal pulses.    Pulmonary/Chest: Effort normal. No accessory muscle usage or stridor. No respiratory distress.   Lymphadenopathy:        Head (right side): No submental, no submandibular, no preauricular and no posterior auricular adenopathy present.        Head (left side): No submental, no submandibular, no  preauricular and no posterior auricular adenopathy present.     He has no cervical adenopathy.        Right cervical: No superficial cervical and no deep cervical adenopathy present.       Left cervical: No superficial cervical and no deep cervical adenopathy present.   Neurological: He is alert and oriented to person, place, and time. No cranial nerve deficit.   Skin: Skin is warm and dry. No rash noted. No erythema.   Psychiatric: He has a normal mood and affect. His behavior is normal. Thought content normal.       AUDIOGRAM:  Bilateral SNHL, high frequency    Old records from 0670-0745 reviewed extensively including his operative reports and ENT visits and physical exam from that time period.  He had an SMRT and septoplasty in 2005, then returned to see ENT in 2007 at which time the perforation was noted and he had a revision SMRT only.        Assessment:       1. Nasal septal perforation    2. Nasal obstruction    3. Sensorineural hearing loss (SNHL) of both ears        Plan:       1.  Nasal septal perforation, nasal obstruction:  We had a long discussion regarding the etiology of his perforation, and the challenge inherent in reconstruction.  He has a long history of smoking, and likely has underlying microvascular disease which would also make successful repair less likely.  I will contact a dedicated facial plastic surgeon to get further recommendations.  He is very symptomatic, and may benefit from a septal button.  I would recommend nasal irrigation with budesonide, will send in prescription to [facility].  I will contact him via [employer] when I have a response regarding his options for his septal perforation.  2.  SNHL: We reviewed the patient's recent audiogram and hearing loss in detail.  We also discussed that he is a excellent candidate for hearing aids, if and when he the patient is motivated.  He was given handouts with information and pricing of hearing aids, and will contact audiology when ready to  proceed.  We also discussed the use hearing protection when exposed to loud noise, including lawn equipment.

## 2018-03-27 NOTE — PROGRESS NOTES
Cruz Dover was seen 03/27/2018 for an audiological evaluation.  Patient complains of long term bilateral hearing loss.  He denies tinnitus and dizziness.    Results reveal a mild-to-profound sensorineural hearing loss 250-8000 Hz for the right ear, and  mild-to-profound sensorineural hearing loss 250-8000 Hz for the left ear.   Speech Reception Thresholds were  15 dBHL for the right ear and 10 dBHL for the left ear.   Word recognition scores were good for the right ear and good for the left ear.   Tympanograms were Type As for the right ear and Type A for the left ear.    Patient was counseled on the above findings.    REC:  ENT review of audiogram  Annual audiogram  Pt not interested in hearing aids

## 2018-04-23 DIAGNOSIS — M25.551 RIGHT HIP PAIN: ICD-10-CM

## 2018-04-23 RX ORDER — TRAMADOL HYDROCHLORIDE 50 MG/1
TABLET ORAL
Qty: 40 TABLET | Refills: 3 | Status: SHIPPED | OUTPATIENT
Start: 2018-04-23 | End: 2018-10-20 | Stop reason: SDUPTHER

## 2018-05-15 ENCOUNTER — TELEPHONE (OUTPATIENT)
Dept: FAMILY MEDICINE | Facility: CLINIC | Age: 68
End: 2018-05-15

## 2018-05-18 ENCOUNTER — PES CALL (OUTPATIENT)
Dept: ADMINISTRATIVE | Facility: CLINIC | Age: 68
End: 2018-05-18

## 2018-05-28 ENCOUNTER — PATIENT OUTREACH (OUTPATIENT)
Dept: ADMINISTRATIVE | Facility: HOSPITAL | Age: 68
End: 2018-05-28

## 2018-05-29 DIAGNOSIS — F51.01 PRIMARY INSOMNIA: ICD-10-CM

## 2018-05-30 RX ORDER — ALPRAZOLAM 0.5 MG/1
0.5 TABLET ORAL 3 TIMES DAILY PRN
Refills: 5 | COMMUNITY
Start: 2018-05-19 | End: 2019-01-16

## 2018-05-30 RX ORDER — ZOLPIDEM TARTRATE 5 MG/1
TABLET ORAL
Qty: 90 TABLET | OUTPATIENT
Start: 2018-05-30

## 2018-09-05 DIAGNOSIS — E78.5 HYPERLIPIDEMIA, UNSPECIFIED HYPERLIPIDEMIA TYPE: ICD-10-CM

## 2018-09-05 RX ORDER — ROSUVASTATIN CALCIUM 40 MG/1
40 TABLET, COATED ORAL NIGHTLY
Qty: 30 TABLET | Refills: 11 | Status: SHIPPED | OUTPATIENT
Start: 2018-09-05 | End: 2019-03-05 | Stop reason: SDUPTHER

## 2018-09-06 ENCOUNTER — OFFICE VISIT (OUTPATIENT)
Dept: FAMILY MEDICINE | Facility: CLINIC | Age: 68
End: 2018-09-06
Payer: MEDICARE

## 2018-09-06 VITALS
WEIGHT: 201 LBS | DIASTOLIC BLOOD PRESSURE: 68 MMHG | HEART RATE: 53 BPM | HEIGHT: 70 IN | BODY MASS INDEX: 28.77 KG/M2 | TEMPERATURE: 98 F | SYSTOLIC BLOOD PRESSURE: 127 MMHG

## 2018-09-06 DIAGNOSIS — D68.9 CLOTTING DISORDER: ICD-10-CM

## 2018-09-06 DIAGNOSIS — M25.551 RIGHT HIP PAIN: ICD-10-CM

## 2018-09-06 DIAGNOSIS — F51.01 PRIMARY INSOMNIA: ICD-10-CM

## 2018-09-06 DIAGNOSIS — D68.9 CLOTTING DISORDER: Primary | ICD-10-CM

## 2018-09-06 DIAGNOSIS — M25.551 RIGHT HIP PAIN: Primary | ICD-10-CM

## 2018-09-06 PROCEDURE — 3074F SYST BP LT 130 MM HG: CPT | Mod: CPTII,,, | Performed by: NURSE PRACTITIONER

## 2018-09-06 PROCEDURE — 99999 PR PBB SHADOW E&M-EST. PATIENT-LVL III: CPT | Mod: PBBFAC,,, | Performed by: NURSE PRACTITIONER

## 2018-09-06 PROCEDURE — 99213 OFFICE O/P EST LOW 20 MIN: CPT | Mod: S$PBB,,, | Performed by: NURSE PRACTITIONER

## 2018-09-06 PROCEDURE — 99213 OFFICE O/P EST LOW 20 MIN: CPT | Mod: PBBFAC,PO | Performed by: NURSE PRACTITIONER

## 2018-09-06 PROCEDURE — 1101F PT FALLS ASSESS-DOCD LE1/YR: CPT | Mod: CPTII,,, | Performed by: NURSE PRACTITIONER

## 2018-09-06 PROCEDURE — 3078F DIAST BP <80 MM HG: CPT | Mod: CPTII,,, | Performed by: NURSE PRACTITIONER

## 2018-09-06 RX ORDER — CLOPIDOGREL BISULFATE 75 MG/1
75 TABLET ORAL DAILY
Qty: 90 TABLET | Refills: 0 | Status: SHIPPED | OUTPATIENT
Start: 2018-09-06 | End: 2019-01-14 | Stop reason: SDUPTHER

## 2018-09-06 RX ORDER — CLOPIDOGREL BISULFATE 75 MG/1
75 TABLET ORAL DAILY
Qty: 90 TABLET | Refills: 1 | Status: SHIPPED | OUTPATIENT
Start: 2018-09-06 | End: 2018-09-06 | Stop reason: SDUPTHER

## 2018-09-06 RX ORDER — ZOLPIDEM TARTRATE 5 MG/1
TABLET ORAL
Qty: 90 TABLET | Refills: 0 | Status: SHIPPED | OUTPATIENT
Start: 2018-09-06 | End: 2019-05-20

## 2018-09-06 RX ORDER — DICLOFENAC SODIUM 10 MG/G
2 GEL TOPICAL 4 TIMES DAILY
Qty: 300 G | Refills: 5 | Status: SHIPPED | OUTPATIENT
Start: 2018-09-06 | End: 2018-09-06 | Stop reason: SDUPTHER

## 2018-09-06 RX ORDER — DICLOFENAC SODIUM 10 MG/G
2 GEL TOPICAL 4 TIMES DAILY
Qty: 300 G | Refills: 0 | Status: SHIPPED | OUTPATIENT
Start: 2018-09-06 | End: 2018-12-21 | Stop reason: SDUPTHER

## 2018-09-06 RX ORDER — ZOLPIDEM TARTRATE 5 MG/1
TABLET ORAL
Qty: 90 TABLET | Refills: 1 | Status: SHIPPED | OUTPATIENT
Start: 2018-09-06 | End: 2018-09-06 | Stop reason: SDUPTHER

## 2018-09-06 NOTE — ASSESSMENT & PLAN NOTE
He has a clotting disorder for which he has had to use plavix.  He has not had bleeding noted with the medicine and he has not had any clotting event on this med.  He needs his Plavix refilled today.

## 2018-09-06 NOTE — PROGRESS NOTES
"Subjective:      Patient ID: Cruz Dover is a 68 y.o. male.    Chief Complaint: Medication Refill    HPI   Patient clinic for medication refill.  Takes Plavix for clotting disorder.  He has not had any bleeding noted or had any clotting events since being on the Plavix. He tolerates the medication well without any difficulties.  He uses Voltaren gel for right hip pain p.r.n..  This works well for him.  He needs a medication refill for this.  He also takes Ambien for insomnia.  He reports he does not have difficultly falling asleep however, has difficulty staying asleep.  Reports he wakes an early mornings with his mind racing and is unable to fall back to sleep at times.  Is at these times he takes an Ambien this helps some.  He is treated by Dr. Casillas, neuro psychologist, for anxiety and he plans to discuss this with him at his next visit which is October 1, 2018. He has no complaints today.    Review of Systems   Constitutional: Negative for chills, fatigue and fever.   Respiratory: Negative for cough, shortness of breath and wheezing.    Cardiovascular: Negative for chest pain, palpitations and leg swelling.   Musculoskeletal: Positive for arthralgias (Right hip pain).   Skin: Negative for rash and wound.   Neurological: Negative for weakness and numbness.   Psychiatric/Behavioral: Positive for sleep disturbance. Negative for self-injury and suicidal ideas. The patient is nervous/anxious.        Objective:     /68   Pulse (!) 53   Temp 97.9 °F (36.6 °C) (Oral)   Ht 5' 10" (1.778 m)   Wt 91.2 kg (201 lb)   BMI 28.84 kg/m²     Physical Exam   Constitutional: He is oriented to person, place, and time. He appears well-developed and well-nourished.   HENT:   Head: Normocephalic.   Eyes: Pupils are equal, round, and reactive to light.   Neck: Normal range of motion. Neck supple.   Cardiovascular: Normal rate, regular rhythm and normal heart sounds.   Pulmonary/Chest: Effort normal and breath sounds " normal. No respiratory distress. He has no decreased breath sounds. He has no wheezes. He has no rhonchi. He has no rales.   Neurological: He is alert and oriented to person, place, and time.   Skin: Skin is warm and dry. No rash noted.   Psychiatric: He has a normal mood and affect. His speech is normal and behavior is normal. Judgment and thought content normal. Cognition and memory are normal.   Vitals reviewed.    Assessment:     1. Clotting disorder    2. Right hip pain    3. Primary insomnia        Plan:     Problem List Items Addressed This Visit        Hematology    Clotting disorder - Primary     He has a clotting disorder for which he has had to use plavix.  He has not had bleeding noted with the medicine and he has not had any clotting event on this med.  He needs his Plavix refilled today.            Orthopedic    Right hip pain       Other    Primary insomnia      Medications sent to pharmacy.  Follow-up in 6 months    Follow-up if symptoms worsen or fail to improve.        Parts of this note was dictated using voice recognition software. Please excuse any grammatical or typographical errors.

## 2018-10-20 DIAGNOSIS — M25.551 RIGHT HIP PAIN: ICD-10-CM

## 2018-10-22 RX ORDER — TRAMADOL HYDROCHLORIDE 50 MG/1
TABLET ORAL
Qty: 40 TABLET | Refills: 4 | Status: SHIPPED | OUTPATIENT
Start: 2018-10-22 | End: 2019-01-16 | Stop reason: SDUPTHER

## 2018-12-11 DIAGNOSIS — F51.01 PRIMARY INSOMNIA: ICD-10-CM

## 2018-12-11 RX ORDER — ZOLPIDEM TARTRATE 5 MG/1
TABLET ORAL
Qty: 90 TABLET | OUTPATIENT
Start: 2018-12-11

## 2018-12-17 ENCOUNTER — PATIENT MESSAGE (OUTPATIENT)
Dept: FAMILY MEDICINE | Facility: CLINIC | Age: 68
End: 2018-12-17

## 2018-12-21 ENCOUNTER — TELEPHONE (OUTPATIENT)
Dept: FAMILY MEDICINE | Facility: CLINIC | Age: 68
End: 2018-12-21

## 2018-12-21 DIAGNOSIS — M25.551 RIGHT HIP PAIN: ICD-10-CM

## 2018-12-21 RX ORDER — DICLOFENAC SODIUM 10 MG/G
2 GEL TOPICAL 4 TIMES DAILY
Qty: 300 G | Refills: 0 | Status: SHIPPED | OUTPATIENT
Start: 2018-12-21

## 2018-12-21 NOTE — TELEPHONE ENCOUNTER
I have signed for the following orders AND/OR meds.  Please call the patient and ask the patient to schedule the testing AND/OR inform about any medications that were sent.      No orders of the defined types were placed in this encounter.      Medications Ordered This Encounter   Medications    diclofenac sodium (VOLTAREN) 1 % Gel     Sig: Apply 2 g topically 4 (four) times daily.     Dispense:  300 g     Refill:  0

## 2019-01-14 RX ORDER — CLOPIDOGREL BISULFATE 75 MG/1
TABLET ORAL
Qty: 90 TABLET | Refills: 1 | Status: SHIPPED | OUTPATIENT
Start: 2019-01-14 | End: 2019-08-27 | Stop reason: SDUPTHER

## 2019-01-16 ENCOUNTER — OFFICE VISIT (OUTPATIENT)
Dept: FAMILY MEDICINE | Facility: CLINIC | Age: 69
End: 2019-01-16
Payer: MEDICARE

## 2019-01-16 VITALS
HEART RATE: 63 BPM | HEIGHT: 70 IN | BODY MASS INDEX: 28.46 KG/M2 | SYSTOLIC BLOOD PRESSURE: 129 MMHG | DIASTOLIC BLOOD PRESSURE: 66 MMHG | TEMPERATURE: 98 F | WEIGHT: 198.81 LBS

## 2019-01-16 DIAGNOSIS — M25.551 RIGHT HIP PAIN: ICD-10-CM

## 2019-01-16 DIAGNOSIS — F51.01 PRIMARY INSOMNIA: ICD-10-CM

## 2019-01-16 DIAGNOSIS — D68.9 CLOTTING DISORDER: ICD-10-CM

## 2019-01-16 DIAGNOSIS — I10 ESSENTIAL HYPERTENSION: Primary | ICD-10-CM

## 2019-01-16 PROCEDURE — 3078F PR MOST RECENT DIASTOLIC BLOOD PRESSURE < 80 MM HG: ICD-10-PCS | Mod: CPTII,S$GLB,, | Performed by: FAMILY MEDICINE

## 2019-01-16 PROCEDURE — 1101F PT FALLS ASSESS-DOCD LE1/YR: CPT | Mod: CPTII,S$GLB,, | Performed by: FAMILY MEDICINE

## 2019-01-16 PROCEDURE — 99999 PR PBB SHADOW E&M-EST. PATIENT-LVL III: CPT | Mod: PBBFAC,,, | Performed by: FAMILY MEDICINE

## 2019-01-16 PROCEDURE — 3074F PR MOST RECENT SYSTOLIC BLOOD PRESSURE < 130 MM HG: ICD-10-PCS | Mod: CPTII,S$GLB,, | Performed by: FAMILY MEDICINE

## 2019-01-16 PROCEDURE — 99499 RISK ADDL DX/OHS AUDIT: ICD-10-PCS | Mod: HCNC,S$GLB,, | Performed by: FAMILY MEDICINE

## 2019-01-16 PROCEDURE — 99214 PR OFFICE/OUTPT VISIT, EST, LEVL IV, 30-39 MIN: ICD-10-PCS | Mod: S$GLB,,, | Performed by: FAMILY MEDICINE

## 2019-01-16 PROCEDURE — 99499 UNLISTED E&M SERVICE: CPT | Mod: HCNC,S$GLB,, | Performed by: FAMILY MEDICINE

## 2019-01-16 PROCEDURE — 1101F PR PT FALLS ASSESS DOC 0-1 FALLS W/OUT INJ PAST YR: ICD-10-PCS | Mod: CPTII,S$GLB,, | Performed by: FAMILY MEDICINE

## 2019-01-16 PROCEDURE — 99999 PR PBB SHADOW E&M-EST. PATIENT-LVL III: ICD-10-PCS | Mod: PBBFAC,,, | Performed by: FAMILY MEDICINE

## 2019-01-16 PROCEDURE — 3074F SYST BP LT 130 MM HG: CPT | Mod: CPTII,S$GLB,, | Performed by: FAMILY MEDICINE

## 2019-01-16 PROCEDURE — 3078F DIAST BP <80 MM HG: CPT | Mod: CPTII,S$GLB,, | Performed by: FAMILY MEDICINE

## 2019-01-16 PROCEDURE — 99214 OFFICE O/P EST MOD 30 MIN: CPT | Mod: S$GLB,,, | Performed by: FAMILY MEDICINE

## 2019-01-16 RX ORDER — AMLODIPINE BESYLATE 10 MG/1
10 TABLET ORAL DAILY
COMMUNITY
End: 2019-05-20

## 2019-01-16 RX ORDER — MIRTAZAPINE 30 MG/1
15 TABLET, FILM COATED ORAL NIGHTLY PRN
COMMUNITY
End: 2019-02-06

## 2019-01-16 RX ORDER — NAPROXEN 500 MG/1
500 TABLET ORAL 2 TIMES DAILY PRN
COMMUNITY
End: 2019-01-16 | Stop reason: ALTCHOICE

## 2019-01-16 RX ORDER — AMOXICILLIN 500 MG
1 CAPSULE ORAL DAILY
COMMUNITY
End: 2019-01-16

## 2019-01-16 RX ORDER — TRAMADOL HYDROCHLORIDE 50 MG/1
50 TABLET ORAL EVERY 6 HOURS PRN
Qty: 40 TABLET | Refills: 5 | Status: SHIPPED | OUTPATIENT
Start: 2019-01-16 | End: 2019-05-20

## 2019-01-16 RX ORDER — ACETAMINOPHEN 500 MG
1000 TABLET ORAL EVERY 6 HOURS PRN
Refills: 0
Start: 2019-01-16 | End: 2019-05-20

## 2019-01-16 RX ORDER — GLUCOSAMINE/CHONDRO SU A 500-400 MG
1 TABLET ORAL 2 TIMES DAILY
COMMUNITY
End: 2019-08-27

## 2019-01-16 RX ORDER — VITAMIN B COMPLEX
1 CAPSULE ORAL DAILY
COMMUNITY
End: 2019-01-16

## 2019-01-16 NOTE — PROGRESS NOTES
Subjective:      Patient ID: Cruz Dover is a 68 y.o. male.    Chief Complaint: Follow-up    Problem List Items Addressed This Visit     Clotting disorder    Overview     He has a clotting disorder for which he has had to use plavix.  He has not had bleeding noted with the medicine and he has not had any clotting event on this med.         Hypertension - Primary    Overview     The patient presents with essential hypertension.  The patient is tolerating the medication well and is in excellent compliance.  The patient is experiencing no side effects.  Counseling was offered regarding low salt diets.  The patient has a reduced salt intake.  The patient denies chest pain, palpitations, shortness of breath, dyspnea on exertion, left or murmur neck pain, nausea, vomiting, diaphoresis, paroxysmal nocturnal dyspnea, and orthopnea.   Hypertension Medications             amLODIPine (NORVASC) 5 MG tablet Take 5 mg by mouth once daily.    BYSTOLIC 5 mg Tab Take 5 mg by mouth once daily.    metoprolol tartrate (LOPRESSOR) 25 MG tablet Take 1 tablet (25 mg total) by mouth 2 (two) times daily. 1 Tablet(s) Oral PRN Every day.                 Primary insomnia    Overview     He has insomnia.  His problem is more of a problem with not being able to go back to sleep when he awakens in the middle of the night.  He has not had problems on the ambien. He states that the sleep has been improving.  [facility]  This has allowed him to use less of the ambien.  He states that he thinks he has had a prolonged erection with trazodone and he does not want to use that.  Belsomra is another medicine that is covered on his insurance.  He is going to confirm about the trazodone and let me know before considering doing that one again at this point.           Right hip pain    Overview     He has a chronic pain of the right hip and he has been on ultram for this. He has been on it chronically and he  has not had to increase the dose.   He uses it several times a week for this.  He also has had chronic knee pain and it helps with that when he uses it.  His pain goes to a 4-5 on the pain scale.  He has tried aleve and it helped him some.  He has also used naproxen intermittently.  We discussed risks of ulcers and bleeding on plavix and naproxen and he will change to using tylenol for his pain along with the ultram prn.         Relevant Medications    traMADol (ULTRAM) 50 mg tablet    acetaminophen (TYLENOL) 500 MG tablet          Past Medical History:  Past Medical History:   Diagnosis Date    Anemia     Colon polyps 8/14/2012    adenomatous-recheck in 2017    Diverticulosis     Hyperlipidemia LDL goal < 100     Hypertension     Primary insomnia 2/21/2018    He has insomnia.  His problem is more of a problem with not being able to go back to sleep when he awakens in the middle of the night.  He has not had problems on the ambien.     Past Surgical History:   Procedure Laterality Date    cardiac bypass      CARPAL TUNNEL RELEASE      COLONOSCOPY  2012    CORONARY ARTERY BYPASS GRAFT      HERNIA MESH REMOVAL      HERNIA REPAIR      NASAL SEPTUM SURGERY      septoplasty and SMR turbinoplasty     [place] COLONOSCOPY,SHIKHA MONTESINOS/CAUTERY  8/14/2012         VASECTOMY      VASECTOMY       Review of patient's allergies indicates:   Allergen Reactions    Penicillin g     Penicillins      Other reaction(s): Unknown     Current Outpatient Medications on File Prior to Visit   Medication Sig Dispense Refill    amLODIPine (NORVASC) 10 MG tablet Take 10 mg by mouth once daily.      aspirin 81 MG chewable tablet once daily.       BYSTOLIC 5 mg Tab Take 5 mg by mouth once daily.  3    clopidogrel (PLAVIX) 75 mg tablet TAKE ONE TABLET BY MOUTH ONCE DAILY 90 tablet 1    diclofenac sodium (VOLTAREN) 1 % Gel Apply 2 g topically 4 (four) times daily. 300 g 0    glucosamine-chondroitin 500-400 mg tablet Take 1  tablet by mouth 2 (two) times daily.      mirtazapine (REMERON) 30 MG tablet Take 15 mg by mouth nightly as needed.       rosuvastatin (CRESTOR) 40 MG Tab Take 1 tablet (40 mg total) by mouth every evening. 30 tablet 11    zolpidem (AMBIEN) 5 MG Tab TAKE ONE TABLET BY MOUTH ONCE DAILY at night AS NEEDED 90 tablet 0    [DISCONTINUED] b complex vitamins capsule Take 1 capsule by mouth once daily.      [DISCONTINUED] naproxen (NAPROSYN) 500 MG tablet Take 500 mg by mouth 2 (two) times daily as needed.      [DISCONTINUED] traMADol (ULTRAM) 50 mg tablet TAKE ONE TABLET BY MOUTH EVERY 6 HOURS AS NEEDED FOR PAIN 40 tablet 4    [DISCONTINUED] ALPRAZolam (XANAX) 0.5 MG tablet Take 0.5 mg by mouth 3 (three) times daily as needed.  5    [DISCONTINUED] amLODIPine (NORVASC) 5 MG tablet Take 5 mg by mouth once daily.  3    [DISCONTINUED] fish oil-omega-3 fatty acids 300-1,000 mg capsule Take 1 capsule by mouth once daily.       No current facility-administered medications on file prior to visit.      Social History     Socioeconomic History    Marital status:      Spouse name: Not on file    Number of children: Not on file    Years of education: Not on file    Highest education level: Not on file   Social Needs    Financial resource strain: Not on file    Food insecurity - worry: Not on file    Food insecurity - inability: Not on file    Transportation needs - medical: Not on file    Transportation needs - non-medical: Not on file   Occupational History    Not on file   Tobacco Use    Smoking status: Former Smoker     Last attempt to quit: 2/10/2008     Years since quitting: 10.9    Smokeless tobacco: Never Used   Substance and Sexual Activity    Alcohol use: Yes     Alcohol/week: 1.8 oz     Types: 1 Glasses of wine, 2 Cans of beer per week     Comment: 2/day    Drug use: No    Sexual activity: Yes     Partners: Female   Other Topics Concern    Not on file   Social History Narrative    Not on file  "    Family History   Problem Relation Age of Onset    Diabetes Mother     Cancer Mother     Diabetes Sister     Cancer Paternal Uncle     Colon cancer Neg Hx        Review of Systems   Constitutional: Negative for chills and fever.   Respiratory: Negative for cough, shortness of breath and wheezing.    Cardiovascular: Negative for chest pain and palpitations.   Gastrointestinal: Negative for abdominal pain, diarrhea, nausea and vomiting.   Genitourinary: Negative for dysuria and hematuria.       Objective:     /66   Pulse 63   Temp 98 °F (36.7 °C) (Oral)   Ht 5' 10" (1.778 m)   Wt 90.2 kg (198 lb 12.8 oz)   BMI 28.52 kg/m²     Physical Exam   Constitutional: He is oriented to person, place, and time. He appears well-developed and well-nourished.   HENT:   Head: Normocephalic and atraumatic.   Eyes: EOM are normal. Pupils are equal, round, and reactive to light.   Neck: Normal range of motion. Neck supple.   Cardiovascular: Normal rate and regular rhythm.   Pulmonary/Chest: Effort normal and breath sounds normal.   Neurological: He is alert and oriented to person, place, and time.   Skin: Skin is warm.   Psychiatric: He has a normal mood and affect. His behavior is normal. Judgment and thought content normal.     [facility] with [facility].  His a1c was 6.4. He is going to change diet and follow [facility] on this.    Assessment:     1. Essential hypertension    2. Primary insomnia    3. Right hip pain    4. Clotting disorder        Plan:     Problem List Items Addressed This Visit     Clotting disorder    Hypertension - Primary    Primary insomnia    Right hip pain    Relevant Medications    traMADol (ULTRAM) 50 mg tablet    acetaminophen (TYLENOL) 500 MG tablet          He is going to let me know about the reaction to trazodone when he researches it.  If this was not the drug he had problems with, change ambien to trazodone 7.5 mg po q hs prn insomnia.    Change " naprosyn to tylenol for his pain and use the ultram prn.    RTC in 6 months.  No Follow-up on file.

## 2019-02-02 ENCOUNTER — PATIENT MESSAGE (OUTPATIENT)
Dept: FAMILY MEDICINE | Facility: CLINIC | Age: 69
End: 2019-02-02

## 2019-02-02 DIAGNOSIS — M25.569 KNEE PAIN, UNSPECIFIED CHRONICITY, UNSPECIFIED LATERALITY: Primary | ICD-10-CM

## 2019-02-03 ENCOUNTER — PATIENT MESSAGE (OUTPATIENT)
Dept: ADMINISTRATIVE | Facility: OTHER | Age: 69
End: 2019-02-03

## 2019-02-04 DIAGNOSIS — M25.561 RIGHT KNEE PAIN, UNSPECIFIED CHRONICITY: Primary | ICD-10-CM

## 2019-02-04 NOTE — TELEPHONE ENCOUNTER
I have signed for the following orders AND/OR meds.  Please call the patient and ask the patient to schedule the testing AND/OR inform about any medications that were sent.      Orders Placed This Encounter   Procedures    Ambulatory referral to Orthopedics     Referral Priority:   Routine     Referral Type:   Consultation     Referral Reason:   Specialty Services Required

## 2019-02-04 NOTE — PROGRESS NOTES
DATE: 2/4/2019  PATIENT: Cruz Dover  REFERRING MD:   CHIEF COMPLAINT:   Chief Complaint   Patient presents with    Right Knee - Pain       HISTORY:  Cruz Dover is a 68 y.o. male  who presents for initial evaluation of his right knee pain.  He is a new patient to me referred by Dr Cat for orthopedic evaluation.  He reports he had an injury in the 80's and has had some pain since but it has increased over the last 6 months.  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  His pain rating today is 4/10.  The pain is relieved with rest, tylenol, ice, topical creams including voltaren and elevation with a pillow.  He would like a cortisone injection today but is afraid of needles.  He has not had visco supplementation.      PAST MEDICAL/SURGICAL HISTORY:  Past Medical History:   Diagnosis Date    Anemia     Colon polyps 8/14/2012    adenomatous-recheck in 2017    Diverticulosis     Hyperlipidemia LDL goal < 100     Hypertension     Primary insomnia 2/21/2018    He has insomnia.  His problem is more of a problem with not being able to go back to sleep when he awakens in the middle of the night.  He has not had problems on the ambien.     Past Surgical History:   Procedure Laterality Date    cardiac bypass      CARPAL TUNNEL RELEASE      COLONOSCOPY  2012    CORONARY ARTERY BYPASS GRAFT      HERNIA MESH REMOVAL      HERNIA REPAIR      NASAL SEPTUM SURGERY      septoplasty and SMR turbinoplasty     [place] COLONOSCOPY,SHIKHA MONTESINOS/CAUTERY  8/14/2012         VASECTOMY      VASECTOMY         Current Medications:   Current Outpatient Medications:     acetaminophen (TYLENOL) 500 MG tablet, Take 2 tablets (1,000 mg total) by mouth every 6 (six) hours as needed for Pain., Disp: , Rfl: 0    amLODIPine (NORVASC) 10 MG tablet, Take 10 mg by mouth once daily., Disp: , Rfl:     aspirin 81  MG chewable tablet, once daily. , Disp: , Rfl:     BYSTOLIC 5 mg Tab, Take 5 mg by mouth once daily., Disp: , Rfl: 3    clopidogrel (PLAVIX) 75 mg tablet, TAKE ONE TABLET BY MOUTH ONCE DAILY, Disp: 90 tablet, Rfl: 1    diclofenac sodium (VOLTAREN) 1 % Gel, Apply 2 g topically 4 (four) times daily., Disp: 300 g, Rfl: 0    glucosamine-chondroitin 500-400 mg tablet, Take 1 tablet by mouth 2 (two) times daily., Disp: , Rfl:     mirtazapine (REMERON) 30 MG tablet, Take 15 mg by mouth nightly as needed. , Disp: , Rfl:     rosuvastatin (CRESTOR) 40 MG Tab, Take 1 tablet (40 mg total) by mouth every evening., Disp: 30 tablet, Rfl: 11    traMADol (ULTRAM) 50 mg tablet, Take 1 tablet (50 mg total) by mouth every 6 (six) hours as needed., Disp: 40 tablet, Rfl: 5    zolpidem (AMBIEN) 5 MG Tab, TAKE ONE TABLET BY MOUTH ONCE DAILY at night AS NEEDED, Disp: 90 tablet, Rfl: 0    Family History: family history was reviewed and is noncontributory  Social History:   Social History     Socioeconomic History    Marital status:      Spouse name: Not on file    Number of children: Not on file    Years of education: Not on file    Highest education level: Not on file   Social Needs    Financial resource strain: Not on file    Food insecurity - worry: Not on file    Food insecurity - inability: Not on file    Transportation needs - medical: Not on file    Transportation needs - non-medical: Not on file   Occupational History    Not on file   Tobacco Use    Smoking status: Former Smoker     Last attempt to quit: 2/10/2008     Years since quitting: 10.9    Smokeless tobacco: Never Used   Substance and Sexual Activity    Alcohol use: Yes     Alcohol/week: 1.8 oz     Types: 1 Glasses of wine, 2 Cans of beer per week     Comment: 2/day    Drug use: No    Sexual activity: Yes     Partners: Female   Other Topics Concern    Not on file   Social History Narrative    Not on file       ROS:  Constitution: Negative for  "chills, fever, and sweats. Negative for unexplained weight loss.  Eyes: no redness, no discharge  Ears: no ear pain or tinnitus  Cardiovascular: Negative for chest pain, irregular heartbeat, leg swelling and palpitations.   Respiratory: Negative for cough and shortness of breath.   Gastrointestinal: Negative for abdominal pain, nausea and vomiting.   Genitourinary: Negative for bladder incontinence and dysuria.   Neurological: Negative for numbness.   Psychiatric/Behavioral: Negative for behavior changes.   Endocrine: Negative for palpitations.   Hematologic/Lymphatic: Negative for bleeding disorders.  Skin: Negative for pruritis or rash.       PHYSICAL EXAM:  Ht 5' 10" (1.778 m)   Wt 89.8 kg (198 lb)   BMI 28.41 kg/m²   Cruz Dover is a well developed, well nourished male in no acute distress. Physical examination of the right knee evaluated the following:    Gait and Alignment  Inspection for ecchymosis, swelling, atrophy, or deformity  Inspection for intra-articular and/or bursal effusions  Tenderness to palpation over the bony and soft tissue structures around the knee  Range of Motion and presence of extensor lag/contractures  Sensation and motor strength  Varus/valgus or anterior/posterior/rotatory instability  Flexion pinch and Salvador's Tests  Patellar alignment/tracking/pain to palpation  Vascular exam to include skin temperature/color/capillary refill    Remarkable findings included:  No edema, effusion, ecchymosis or obvious deformity  Nontender to palpation   ROM full  Strength 5/5  No gross instability  Sensation intact  Skin warm, dry, intact      IMAGING:   X-ray obtained Right knee performed today personally reviewed with patient. Radiologist report as follows:   There is no radiographic evidence of acute osseous, articular, or soft tissue abnormality.  There is mild joint space loss in the medial tibiofemoral compartments bilaterally.  Multiple surgical clips noted in the medial soft tissues " "of the left lower extremity. Scattered vascular calcifications are present.     ASSESSMENT:   Right knee osteoarthritis    PLAN:  The nature of the diagnosis, using models and diagrams when appropriate, was explained to the patient in detail. Treatment option discussed included non-operative measures of rest,  modification of activities, application of ice, elevation of extremity, compression, over the counter pain/antiinflammatory relief, physica/occupational therapy, cortisone injection and viscosupplementation. All questions answered and the patient wishes to proceed today with cortisone injection.  right knee cortisone injection performed today (see procedure documentation).  I have instructed to monitor injection site for signs and symptoms of inflammation/infection.  I have instructed to elevate and apply ice to "knees this evening.  He declines a knee brace today.  I will request prior approval for Euflexxa injections.  He will return in 2 weeks for Euflexxa #1 pending insurance approval.  .     Answers for HPI/ROS submitted by the patient on 2/5/2019   Leg pain  unexpected weight change: No  appetite change : No  sleep disturbance: Yes  IMMUNOCOMPROMISED: No  nervous/ anxious: No  dysphoric mood: No  rash: No  visual disturbance: No  eye redness: No  eye pain: No  ear pain: No  tinnitus: Yes  hearing loss: Yes  sinus pressure : No  nosebleeds: Yes  enviro allergies: No  food allergies: No  cough: No  shortness of breath: No  sweating: No  frequency: No  difficulty urinating: No  hematuria: No  chest pain: No  palpitations: No  nausea: No  vomiting: No  diarrhea: No  blood in stool: No  constipation: No  headaches: No  dizziness: No  numbness: No  seizures: No  joint swelling: No  myalgia: No  weakness: No  back pain: No  Pain Chronicity: recurrent  History of trauma: Yes  Onset: more than 1 month ago  Frequency: constantly  Progression since onset: gradually worsening  injury location: at home  pain- " numeric: 7/10  pain location: right knee  pain quality: sharp  Radiating Pain: Yes  If your pain is radiating, to what part of the body?: right knee  Aggravating factors: flexion  fever: No  inability to bear weight: No  itching: No  joint locking: No  limited range of motion: Yes  stiffness: Yes  tingling: No  Treatments tried: brace/corset, cold, heat, rest  physical therapy: not tried  Improvement on treatment: significant

## 2019-02-06 ENCOUNTER — OFFICE VISIT (OUTPATIENT)
Dept: ORTHOPEDICS | Facility: CLINIC | Age: 69
End: 2019-02-06
Payer: MEDICARE

## 2019-02-06 ENCOUNTER — HOSPITAL ENCOUNTER (OUTPATIENT)
Dept: RADIOLOGY | Facility: HOSPITAL | Age: 69
Discharge: HOME | End: 2019-02-06
Attending: NURSE PRACTITIONER
Payer: MEDICARE

## 2019-02-06 VITALS — WEIGHT: 198 LBS | HEIGHT: 70 IN | BODY MASS INDEX: 28.35 KG/M2

## 2019-02-06 DIAGNOSIS — M25.561 RIGHT KNEE PAIN, UNSPECIFIED CHRONICITY: ICD-10-CM

## 2019-02-06 DIAGNOSIS — M17.11 PRIMARY OSTEOARTHRITIS OF RIGHT KNEE: Primary | ICD-10-CM

## 2019-02-06 PROCEDURE — 20610 DRAIN/INJ JOINT/BURSA W/O US: CPT | Mod: HCNC,RT,S$GLB, | Performed by: NURSE PRACTITIONER

## 2019-02-06 PROCEDURE — 73562 X-RAY EXAM OF KNEE 3: CPT | Mod: TC,HCNC,PO,RT

## 2019-02-06 PROCEDURE — 73564 XR KNEE ORTHO RIGHT WITH FLEXION: ICD-10-PCS | Mod: 26,HCNC,RT, | Performed by: RADIOLOGY

## 2019-02-06 PROCEDURE — 99214 PR OFFICE/OUTPT VISIT, EST, LEVL IV, 30-39 MIN: ICD-10-PCS | Mod: HCNC,25,S$GLB, | Performed by: NURSE PRACTITIONER

## 2019-02-06 PROCEDURE — 73564 X-RAY EXAM KNEE 4 OR MORE: CPT | Mod: 26,HCNC,RT, | Performed by: RADIOLOGY

## 2019-02-06 PROCEDURE — 99214 OFFICE O/P EST MOD 30 MIN: CPT | Mod: HCNC,25,S$GLB, | Performed by: NURSE PRACTITIONER

## 2019-02-06 PROCEDURE — 73562 XR KNEE ORTHO RIGHT WITH FLEXION: ICD-10-PCS | Mod: 26,59,HCNC,LT | Performed by: RADIOLOGY

## 2019-02-06 PROCEDURE — 20610 LARGE JOINT ASPIRATION/INJECTION: R KNEE: ICD-10-PCS | Mod: HCNC,RT,S$GLB, | Performed by: NURSE PRACTITIONER

## 2019-02-06 PROCEDURE — 99999 PR PBB SHADOW E&M-EST. PATIENT-LVL III: CPT | Mod: PBBFAC,HCNC,, | Performed by: NURSE PRACTITIONER

## 2019-02-06 PROCEDURE — 73562 X-RAY EXAM OF KNEE 3: CPT | Mod: 26,59,HCNC,LT | Performed by: RADIOLOGY

## 2019-02-06 PROCEDURE — 99999 PR PBB SHADOW E&M-EST. PATIENT-LVL III: ICD-10-PCS | Mod: PBBFAC,HCNC,, | Performed by: NURSE PRACTITIONER

## 2019-02-06 RX ORDER — TRIAMCINOLONE ACETONIDE 40 MG/ML
40 INJECTION, SUSPENSION INTRA-ARTICULAR; INTRAMUSCULAR
Status: DISCONTINUED | OUTPATIENT
Start: 2019-02-06 | End: 2019-02-06 | Stop reason: HOSPADM

## 2019-02-06 RX ADMIN — TRIAMCINOLONE ACETONIDE 40 MG: 40 INJECTION, SUSPENSION INTRA-ARTICULAR; INTRAMUSCULAR at 11:02

## 2019-02-06 NOTE — PROCEDURES
Large Joint Aspiration/Injection: R knee  Date/Time: 2/6/2019 11:05 AM  Performed by: GRADY Fernández  Authorized by: GRADY Fernández     Consent Done?:  Yes (Verbal)  Indications:  Pain  Timeout: Prior to procedure the correct patient, procedure, and site was verified      Location:  Knee  Site:  R knee  Prep: Patient was prepped and draped in usual sterile fashion    Ultrasonic Guidance for needle placement: No  Needle size:  22 G  Approach:  Anterolateral  Medications:  40 mg triamcinolone acetonide 40 mg/mL  Patient tolerance:  Patient tolerated the procedure well with no immediate complications

## 2019-02-06 NOTE — LETTER
February 6, 2019      Binh Cat MD  [id number] [facility] [address] [id number]           [place]  [address] [facility] [address] [facility] [address] [address]  Phone: [phone]          Patient: Cruz Dover   MR Number: [id number]   YOB: 1950   Date of Visit: 2/6/2019       Dear Dr. Binh Cat:    Thank you for referring Cruz Dover to me for evaluation. Attached you will find relevant portions of my assessment and plan of care.    If you have questions, please do not hesitate to call me. I look forward to following Cruz Dover along with you.    Sincerely,    Radha Isabel, APRN    Enclosure  CC:  No Recipients    If you would like to receive this communication electronically, please contact [email or web address] or [phone] to request more information on [employer] Link access.    For providers and/or their staff who would like to refer a patient to [facility], please contact us through our one-stop-shop provider referral line, Nikita Osorio, at [phone].    If you feel you have received this communication in error or would no longer like to receive these types of communications, please e-mail [email or web address]

## 2019-02-14 ENCOUNTER — PATIENT MESSAGE (OUTPATIENT)
Dept: FAMILY MEDICINE | Facility: CLINIC | Age: 69
End: 2019-02-14

## 2019-02-14 ENCOUNTER — TELEPHONE (OUTPATIENT)
Dept: FAMILY MEDICINE | Facility: CLINIC | Age: 69
End: 2019-02-14

## 2019-02-14 DIAGNOSIS — E78.5 HYPERLIPIDEMIA, UNSPECIFIED HYPERLIPIDEMIA TYPE: Primary | ICD-10-CM

## 2019-02-19 ENCOUNTER — PATIENT MESSAGE (OUTPATIENT)
Dept: ORTHOPEDICS | Facility: CLINIC | Age: 69
End: 2019-02-19

## 2019-02-19 ENCOUNTER — PATIENT MESSAGE (OUTPATIENT)
Dept: FAMILY MEDICINE | Facility: CLINIC | Age: 69
End: 2019-02-19

## 2019-02-20 ENCOUNTER — OFFICE VISIT (OUTPATIENT)
Dept: ORTHOPEDICS | Facility: CLINIC | Age: 69
End: 2019-02-20
Payer: MEDICARE

## 2019-02-20 VITALS — HEIGHT: 70 IN | WEIGHT: 198 LBS | BODY MASS INDEX: 28.35 KG/M2

## 2019-02-20 DIAGNOSIS — M17.11 PRIMARY OSTEOARTHRITIS OF RIGHT KNEE: Primary | ICD-10-CM

## 2019-02-20 PROCEDURE — 99499 UNLISTED E&M SERVICE: CPT | Mod: HCNC,S$GLB,, | Performed by: NURSE PRACTITIONER

## 2019-02-20 PROCEDURE — 99499 NO LOS: ICD-10-PCS | Mod: HCNC,S$GLB,, | Performed by: NURSE PRACTITIONER

## 2019-02-20 PROCEDURE — 99999 PR PBB SHADOW E&M-EST. PATIENT-LVL II: ICD-10-PCS | Mod: PBBFAC,HCNC,, | Performed by: NURSE PRACTITIONER

## 2019-02-20 PROCEDURE — 20610 LARGE JOINT ASPIRATION/INJECTION: R KNEE: ICD-10-PCS | Mod: HCNC,RT,S$GLB, | Performed by: NURSE PRACTITIONER

## 2019-02-20 PROCEDURE — 99999 PR PBB SHADOW E&M-EST. PATIENT-LVL II: CPT | Mod: PBBFAC,HCNC,, | Performed by: NURSE PRACTITIONER

## 2019-02-20 PROCEDURE — 20610 DRAIN/INJ JOINT/BURSA W/O US: CPT | Mod: HCNC,RT,S$GLB, | Performed by: NURSE PRACTITIONER

## 2019-02-20 NOTE — PROCEDURES
Large Joint Aspiration/Injection: R knee  Date/Time: 2/20/2019 10:02 AM  Performed by: GRADY Fernández  Authorized by: GRADY Fernández     Consent Done?:  Yes (Verbal)  Indications:  Pain  Timeout: Prior to procedure the correct patient, procedure, and site was verified      Location:  Knee  Site:  R knee  Prep: Patient was prepped and draped in usual sterile fashion    Ultrasonic Guidance for needle placement: No  Needle size:  22 G  Approach:  Anterolateral  Medications:  20 mg sodium hyaluronate (EUFLEXXA) 10 mg/mL(mw 2.4 -3.6 million)  Patient tolerance:  Patient tolerated the procedure well with no immediate complications

## 2019-02-20 NOTE — PROGRESS NOTES
"DATE: 2/20/2019  PATIENT: Cruz Dover    HISTORY:  Cruz Dover is a 68 y.o. male who returns for follow up evaluation of He is diagnosed with osteoarthritis. He underwent cortisone injection last week without complication. He presents today for Euflexxa injection # 1.  He does note significant improvement in pain since receiving an injection last week.  His pain rating today is 0/10.      PMH/PSH/FamHx/SocHx:  Reviewed and unchanged from prior visit    ROS:  Constitution: Negative for chills, fever, and sweats. Negative for unexplained weight loss.  HENT: Negative for headaches and blurry vision.   Cardiovascular: Negative for chest pain, irregular heartbeat, leg swelling and palpitations.   Respiratory: Negative for cough and shortness of breath.   Gastrointestinal: Negative for abdominal pain, heartburn, nausea and vomiting.   Genitourinary: Negative for bladder incontinence and dysuria.   Musculoskeletal: Negative for systemic arthritis, joint swelling, muscle weakness and myalgias.   Neurological: Negative for numbness.   Psychiatric/Behavioral: Negative for depression.   Endocrine: Negative for polyuria.   Hematologic/Lymphatic: Negative for bleeding disorders.  Skin: Negative for poor wound healing.       EXAM:  Ht 5' 10" (1.778 m)   Wt 89.8 kg (198 lb)   BMI 28.41 kg/m²   Cruz Dover is a well developed, well nourished male in no acute distress. Physical examination of the right knee evaluated the following:     Gait and Alignment  Inspection for ecchymosis, swelling, atrophy, or deformity  Inspection for intra-articular and/or bursal effusions  Tenderness to palpation over the bony and soft tissue structures around the knee  Range of Motion and presence of extensor lag/contractures  Sensation and motor strength  Varus/valgus or anterior/posterior/rotatory instability  Flexion pinch and Salvador's Tests  Patellar alignment/tracking/pain to palpation  Vascular exam to include skin " temperature/color/capillary refill     Remarkable findings included:  No edema, effusion, ecchymosis or obvious deformity  Nontender to palpation   ROM full  Strength 5/5  No gross instability  Sensation intact  Skin warm, dry, intact        IMAGING:   X-ray obtained Right knee performed 02/06/19 personally reviewed with patient. Radiologist report as follows:   There is no radiographic evidence of acute osseous, articular, or soft tissue abnormality.  There is mild joint space loss in the medial tibiofemoral compartments bilaterally.  Multiple surgical clips noted in the medial soft tissues of the left lower extremity. Scattered vascular calcifications are present.     ASSESSMENT:  Right knee osteoarthritis    PLAN:  The implications of the patient's evolution of symptoms and findings were explained to the patient in detail.  Euflexxa injection #1 performed Right knee today (see procedure documentation).  He will monitor for signs and symptoms of infection/inflammation.  Instructed to elevate legs and apply ice tonight.  He will return next week for Euflexxa injection #2 or for follow up evaluation as needed.

## 2019-02-21 ENCOUNTER — PATIENT OUTREACH (OUTPATIENT)
Dept: OTHER | Facility: OTHER | Age: 69
End: 2019-02-21

## 2019-02-21 NOTE — PROGRESS NOTES
Digital Medicine Enrollment Call    Introduced Mr. Cruz Dover to Digital Medicine.     Discussed program expectations and requirements.    Introduced digital medicine care team.     Reviewed the importance of self-monitoring for digital medicine participation.     Reviewed that the Digital Medicine team is not available for emergencies and instructed the patient to call 911 or [facility] ([phone] or [phone]) if one arises.            1st attempt for enrollment call. Left voicemail.         Last 5 Patient Entered Readings                                      Current 30 Day Average: 116/71     Recent Readings 2/20/2019 2/20/2019    SBP (mmHg) 116 132    DBP (mmHg) 62 80    Pulse 59 58

## 2019-02-21 NOTE — LETTER
February 21, 2019     Cruz Dover  [address] [facility] [address]       Dear Cruz,    Welcome to [facility] Our goal is to make care effective, proactive and convenient by using data you send us from home to better treat your chronic conditions.          My name is Clotilde Myers, and I am your dedicated Digital Medicine clinician. As an expert in medication management, I will help ensure that the medications you are taking continue to provide the intended benefits and help you reach your goals. You can reach me directly at [phone] or by sending me a message directly through your [facility] account.        I am Agata Shaver and I will be your health . My job is to help you identify lifestyle changes to improve your disease control. We will talk about nutrition, exercise, and other ways you may be able to adjust your current habits to better your health. Additionally, we will help ensure you are completing the tests and screenings that are necessary to help manage your conditions. You can reach me directly at [phone] or by sending me a message directly through your [facility] account.    Most importantly, YOU are at the center of this team. Together, we will work to improve your overall health and encourage you to meet your goals for a healthier lifestyle.     What we expect from YOU:  · Please take frequent home blood pressure measurements. We ask that you take at least 1 blood pressure reading per week, but more information will better help us get you know you. Be sure you rest for a few minutes before taking the reading in a quiet, comfortable place.     Be available to receive phone calls or [facility] messages, when appropriate, from your care team. Please let us know if there are any specific days or times that work best for us to reach you via phone.     Complete routine tests and screenings. Dont worry, we will help keep you on track!           What you  should expect from your Digital Medicine Care Team:   We will work with you to create a personalized plan of care and provide you with encouragement and education, including regarding lifestyle changes, that could help you manage your disease states.     We will adjust your current medications, if needed, and continue to monitor your long-term progress.     We will provide you and your physician with monthly progress reports after you have been in the program for more than 30 days.     We will send you reminders through [facility] and text messages to help ensure you do not miss any testing deadlines to help manage your disease states.    You will be able to reach us by phone or through your [facility] account by clicking our names under Care Team on the right side of the home screen.    I look forward to working with you to achieve your blood pressure goals!    We look forward to working with you to help manage your health,    Sincerely,    Your Digital Medicine Team    Please visit our websites to learn more:   · Hypertension: [email or web address]      Remember, we are not available for emergencies. If you have an emergency, please contact your doctors office directly or call [facility] on-call ([phone] or [phone]) or [phone].

## 2019-02-25 ENCOUNTER — PATIENT MESSAGE (OUTPATIENT)
Dept: FAMILY MEDICINE | Facility: CLINIC | Age: 69
End: 2019-02-25

## 2019-02-26 ENCOUNTER — PATIENT OUTREACH (OUTPATIENT)
Dept: OTHER | Facility: OTHER | Age: 69
End: 2019-02-26

## 2019-02-26 NOTE — PROGRESS NOTES
Last 5 Patient Entered Readings                                      Current 30 Day Average: 128/72     Recent Readings 2/26/2019 2/25/2019 2/24/2019 2/22/2019 2/21/2019    SBP (mmHg) 121 132 131 137 133    DBP (mmHg) 69 68 72 71 81    Pulse 61 54 56 53 61          Digital Medicine: Health  Introduction    Introduced Mr. Cruz Dover to Digital Medicine. Discussed health  role and recommended lifestyle modifications.    Lifestyle Assessment:  Current Dietary Habits(i.e. low sodium, food labels, dining out):   Patient states he eats meat potatoes and vegetables. He states he loves vann and sausage but knows its not good for him. He mentions he likes to snack on fruit throughout the day. His wife and he only go out to eat probably twice a month and its never fast food.     Exercise:   Patient states he used to be avid on the treadmill he has on his house but his arthritis starting getting bad in his hip and knee so he backed off. He agreed to start getting on the treadmill again for about 30 minutes to an hour. [place] so he is always doing some sort of yard work. He says he works about 8 hours a day outside. He and his wife also recently got a gym membership and he expressed wanting to start that up.     Alcohol/Tobacco:   Patient quit smoking in 2011 and quit drinking in January 2019.    Medication Adherence: has been compliant with the medicaiton regimen     Other goals:   None currently.    Reviewed AHA/AACE recommendations:  Limit sodium intake to <2000mg/day  Perform 150 minutes of physical activity per week    Reviewed the importance of self-monitoring, medication adherence, and that the health  can be used as a resource for lifestyle modifications to help reduce or maintain a healthy lifestyle.  Reviewed that the Digital Medicine team is not available for emergencies and instructed the patient to call 911 or [facility] On [facility] ([phone] or [phone]) if one  arises.

## 2019-02-27 ENCOUNTER — OFFICE VISIT (OUTPATIENT)
Dept: ORTHOPEDICS | Facility: CLINIC | Age: 69
End: 2019-02-27
Payer: MEDICARE

## 2019-02-27 VITALS — WEIGHT: 198 LBS | BODY MASS INDEX: 28.35 KG/M2 | HEIGHT: 70 IN

## 2019-02-27 DIAGNOSIS — M17.11 PRIMARY OSTEOARTHRITIS OF RIGHT KNEE: Primary | ICD-10-CM

## 2019-02-27 PROCEDURE — 20610 DRAIN/INJ JOINT/BURSA W/O US: CPT | Mod: HCNC,RT,S$GLB, | Performed by: NURSE PRACTITIONER

## 2019-02-27 PROCEDURE — 99499 NO LOS: ICD-10-PCS | Mod: HCNC,S$GLB,, | Performed by: NURSE PRACTITIONER

## 2019-02-27 PROCEDURE — 99499 UNLISTED E&M SERVICE: CPT | Mod: HCNC,S$GLB,, | Performed by: NURSE PRACTITIONER

## 2019-02-27 PROCEDURE — 99999 PR PBB SHADOW E&M-EST. PATIENT-LVL III: CPT | Mod: PBBFAC,HCNC,, | Performed by: NURSE PRACTITIONER

## 2019-02-27 PROCEDURE — 99999 PR PBB SHADOW E&M-EST. PATIENT-LVL III: ICD-10-PCS | Mod: PBBFAC,HCNC,, | Performed by: NURSE PRACTITIONER

## 2019-02-27 PROCEDURE — 20610 LARGE JOINT ASPIRATION/INJECTION: R KNEE: ICD-10-PCS | Mod: HCNC,RT,S$GLB, | Performed by: NURSE PRACTITIONER

## 2019-02-27 NOTE — PROCEDURES
Large Joint Aspiration/Injection: R knee  Date/Time: 2/27/2019 8:58 AM  Performed by: GRADY Fernández  Authorized by: GRADY Fernández     Consent Done?:  Yes (Verbal)  Indications:  Pain  Timeout: Prior to procedure the correct patient, procedure, and site was verified      Location:  Knee  Site:  R knee  Prep: Patient was prepped and draped in usual sterile fashion    Ultrasonic Guidance for needle placement: No  Needle size:  22 G  Approach:  Anterolateral  Medications:  20 mg sodium hyaluronate (EUFLEXXA) 10 mg/mL(mw 2.4 -3.6 million)  Patient tolerance:  Patient tolerated the procedure well with no immediate complications

## 2019-03-04 ENCOUNTER — LAB VISIT (OUTPATIENT)
Dept: LAB | Facility: HOSPITAL | Age: 69
End: 2019-03-04
Attending: FAMILY MEDICINE
Payer: MEDICARE

## 2019-03-04 DIAGNOSIS — E78.5 HYPERLIPIDEMIA, UNSPECIFIED HYPERLIPIDEMIA TYPE: ICD-10-CM

## 2019-03-04 LAB
CHOLEST SERPL-MCNC: 101 MG/DL
CHOLEST/HDLC SERPL: 2.5 {RATIO}
HDLC SERPL-MCNC: 41 MG/DL
HDLC SERPL: 40.6 %
LDLC SERPL CALC-MCNC: 47.2 MG/DL
NONHDLC SERPL-MCNC: 60 MG/DL
TRIGL SERPL-MCNC: 64 MG/DL

## 2019-03-04 PROCEDURE — 80061 LIPID PANEL: CPT | Mod: HCNC

## 2019-03-04 PROCEDURE — 36415 COLL VENOUS BLD VENIPUNCTURE: CPT | Mod: HCNC,PO

## 2019-03-04 NOTE — PROGRESS NOTES
"DATE: 3/4/2019  PATIENT: Cruz Dover    HISTORY:  Cruz Dover is a 68 y.o. male who returns for follow up evaluation of his right knee pain.  He is diagnosed with osteoarthritis. He underwent Euflexxa injection # 2 last week without complication. He presents today for Euflexxa injection # 3.  He does note improvement in pain since receiving an injection last week.  His pain rating today is 0/10.  He is so happy with his outcome with the injections. He reports he did a good workout with his knee yesterday and did not have any pain.        PMH/PSH/FamHx/SocHx:  Reviewed and unchanged from prior visit    ROS:  Constitution: Negative for chills, fever, and sweats. Negative for unexplained weight loss.  HENT: Negative for headaches and blurry vision.   Cardiovascular: Negative for chest pain, irregular heartbeat, leg swelling and palpitations.   Respiratory: Negative for cough and shortness of breath.   Gastrointestinal: Negative for abdominal pain, heartburn, nausea and vomiting.   Genitourinary: Negative for bladder incontinence and dysuria.   Musculoskeletal: Negative for systemic arthritis, joint swelling, muscle weakness and myalgias.   Neurological: Negative for numbness.   Psychiatric/Behavioral: Negative for depression.   Endocrine: Negative for polyuria.   Hematologic/Lymphatic: Negative for bleeding disorders.  Skin: Negative for poor wound healing.       EXAM:  Ht 5' 10" (1.778 m)   Wt 89.8 kg (198 lb)   BMI 28.41 kg/m²   Cruz Dover is a well developed, well nourished male in no acute distress. Physical examination of the right knee evaluated the following:     Gait and Alignment  Inspection for ecchymosis, swelling, atrophy, or deformity  Inspection for intra-articular and/or bursal effusions  Tenderness to palpation over the bony and soft tissue structures around the knee  Range of Motion and presence of extensor lag/contractures  Sensation and motor strength  Varus/valgus or " anterior/posterior/rotatory instability  Flexion pinch and Salvador's Tests  Patellar alignment/tracking/pain to palpation  Vascular exam to include skin temperature/color/capillary refill     Remarkable findings included:  No edema, effusion, ecchymosis or obvious deformity  Nontender to palpation   ROM full  Strength 5/5  No gross instability  Sensation intact  Skin warm, dry, intact        IMAGING:   X-ray obtained Right knee performed 02/06/19 personally reviewed with patient. Radiologist report as follows:   There is no radiographic evidence of acute osseous, articular, or soft tissue abnormality.  There is mild joint space loss in the medial tibiofemoral compartments bilaterally.  Multiple surgical clips noted in the medial soft tissues of the left lower extremity. Scattered vascular calcifications are present.     ASSESSMENT:  Right knee osteoarthritis    PLAN:  The implications of the patient's evolution of symptoms and findings were explained to the patient in detail.  Euflexxa injection #3 performed Right knee today (see procedure documentation).  He will monitor for signs and symptoms of infection/inflammation.  Instructed to elevate legs and apply ice tonight.  He will return for follow up evaluation as needed.

## 2019-03-05 DIAGNOSIS — E78.5 HYPERLIPIDEMIA, UNSPECIFIED HYPERLIPIDEMIA TYPE: ICD-10-CM

## 2019-03-05 RX ORDER — ROSUVASTATIN CALCIUM 40 MG/1
40 TABLET, COATED ORAL NIGHTLY
Qty: 30 TABLET | Refills: 11 | Status: SHIPPED | OUTPATIENT
Start: 2019-03-05 | End: 2019-05-20

## 2019-03-05 NOTE — TELEPHONE ENCOUNTER
----- Message from Binh Cat MD sent at 3/5/2019  7:49 AM CST -----  The Lipid Panel is at target.   Recheck a lipid and liver panel in 12 months.  Notify the patient of the appointment.   Refill the patient's lipid medicines for 12 months.       The patient's health maintenance that is due is below:  There are no preventive care reminders to display for this patient.

## 2019-03-06 ENCOUNTER — OFFICE VISIT (OUTPATIENT)
Dept: ORTHOPEDICS | Facility: CLINIC | Age: 69
End: 2019-03-06
Payer: MEDICARE

## 2019-03-06 VITALS — BODY MASS INDEX: 28.35 KG/M2 | WEIGHT: 198 LBS | HEIGHT: 70 IN

## 2019-03-06 DIAGNOSIS — M17.11 PRIMARY OSTEOARTHRITIS OF RIGHT KNEE: Primary | ICD-10-CM

## 2019-03-06 PROCEDURE — 20610 LARGE JOINT ASPIRATION/INJECTION: R KNEE: ICD-10-PCS | Mod: HCNC,RT,S$GLB, | Performed by: NURSE PRACTITIONER

## 2019-03-06 PROCEDURE — 99499 UNLISTED E&M SERVICE: CPT | Mod: HCNC,S$GLB,, | Performed by: NURSE PRACTITIONER

## 2019-03-06 PROCEDURE — 99499 NO LOS: ICD-10-PCS | Mod: HCNC,S$GLB,, | Performed by: NURSE PRACTITIONER

## 2019-03-06 PROCEDURE — 99999 PR PBB SHADOW E&M-EST. PATIENT-LVL III: CPT | Mod: PBBFAC,HCNC,, | Performed by: NURSE PRACTITIONER

## 2019-03-06 PROCEDURE — 99999 PR PBB SHADOW E&M-EST. PATIENT-LVL III: ICD-10-PCS | Mod: PBBFAC,HCNC,, | Performed by: NURSE PRACTITIONER

## 2019-03-06 PROCEDURE — 20610 DRAIN/INJ JOINT/BURSA W/O US: CPT | Mod: HCNC,RT,S$GLB, | Performed by: NURSE PRACTITIONER

## 2019-03-06 NOTE — PROCEDURES
Large Joint Aspiration/Injection: R knee  Date/Time: 3/6/2019 9:00 AM  Performed by: GRADY Fernández  Authorized by: GRADY Fernández     Consent Done?:  Yes (Verbal)  Indications:  Pain  Timeout: Prior to procedure the correct patient, procedure, and site was verified      Location:  Knee  Site:  R knee  Prep: Patient was prepped and draped in usual sterile fashion    Ultrasonic Guidance for needle placement: No  Needle size:  22 G  Approach:  Anterolateral  Medications:  20 mg sodium hyaluronate (EUFLEXXA) 10 mg/mL(mw 2.4 -3.6 million)  Patient tolerance:  Patient tolerated the procedure well with no immediate complications

## 2019-03-07 ENCOUNTER — PATIENT MESSAGE (OUTPATIENT)
Dept: FAMILY MEDICINE | Facility: CLINIC | Age: 69
End: 2019-03-07

## 2019-03-15 ENCOUNTER — TELEPHONE (OUTPATIENT)
Dept: FAMILY MEDICINE | Facility: CLINIC | Age: 69
End: 2019-03-15

## 2019-03-15 NOTE — TELEPHONE ENCOUNTER
----- Message from Andre Mireles sent at 3/15/2019  3:27 PM CDT -----  Contact: Pt  The pt is calling back to reschedule his annual wellness visit that he missed,please advise as soon as possible [phone] (home)

## 2019-03-18 ENCOUNTER — TELEPHONE (OUTPATIENT)
Dept: FAMILY MEDICINE | Facility: CLINIC | Age: 69
End: 2019-03-18

## 2019-03-19 ENCOUNTER — PATIENT OUTREACH (OUTPATIENT)
Dept: OTHER | Facility: OTHER | Age: 69
End: 2019-03-19

## 2019-04-12 ENCOUNTER — PATIENT OUTREACH (OUTPATIENT)
Dept: OTHER | Facility: OTHER | Age: 69
End: 2019-04-12

## 2019-05-13 ENCOUNTER — PATIENT MESSAGE (OUTPATIENT)
Dept: FAMILY MEDICINE | Facility: CLINIC | Age: 69
End: 2019-05-13

## 2019-05-13 DIAGNOSIS — M25.50 ARTHRALGIA, UNSPECIFIED JOINT: Primary | ICD-10-CM

## 2019-05-14 ENCOUNTER — TELEPHONE (OUTPATIENT)
Dept: FAMILY MEDICINE | Facility: CLINIC | Age: 69
End: 2019-05-14

## 2019-05-14 ENCOUNTER — TELEPHONE (OUTPATIENT)
Dept: ORTHOPEDICS | Facility: CLINIC | Age: 69
End: 2019-05-14

## 2019-05-14 NOTE — TELEPHONE ENCOUNTER
Spoke with Keila and let her know that Dr. Lott would still be following up with pt while he is in home health.

## 2019-05-14 NOTE — TELEPHONE ENCOUNTER
----- Message from Becka Corrales sent at 5/14/2019  3:18 PM CDT -----  Contact: Keila- [facility]-[phone]  Would like to verify if Dr. Cat will continue to F/U with [facility].  Please call back at [phone]. Thx-AH

## 2019-05-14 NOTE — TELEPHONE ENCOUNTER
I have signed for the following orders AND/OR meds.  Please call the patient and ask the patient to schedule the testing AND/OR inform about any medications that were sent.      Orders Placed This Encounter   Procedures    Ambulatory referral to Orthopedics     Referral Priority:   Routine     Referral Type:   Consultation     Requested Specialty:   Orthopedic Surgery     Number of Visits Requested:   1

## 2019-05-16 ENCOUNTER — PATIENT OUTREACH (OUTPATIENT)
Dept: OTHER | Facility: OTHER | Age: 69
End: 2019-05-16

## 2019-05-16 NOTE — PROGRESS NOTES
Last 5 Patient Entered Readings                                      Current 30 Day Average: 114/75     Recent Readings 5/15/2019 4/10/2019 4/10/2019 4/3/2019 3/23/2019    SBP (mmHg) 114 120 98 134 132    DBP (mmHg) 75 61 74 70 66    Pulse 59 65 66 57 58          Sent patient an e-mail in regards to recently being discharged from the hospital. Will push outreach in waits for a response.

## 2019-05-17 NOTE — PROGRESS NOTES
DATE: 5/17/2019  PATIENT: Cruz Dover  REFERRING MD:   CHIEF COMPLAINT:   Chief Complaint   Patient presents with    Right Shoulder - Pain    Left Shoulder - Pain       HISTORY:  Cruz Dover is a 68 y.o. male  who presents for initial evaluation of his bilateral shoulder pain, he is right hand dominant.  He reports he had an accident last month when he was cutting trees standing in a tractor bucket and fell to the ground 12 feet below.  [place] [facility] [place].  He originally did not have feeling in his shoulders but did have function.  He had been performing formal physical therapy.  He reports he woke up one morning last week and his feeling had returned and his shoulders are painful.  He is not able to perform his physical therapy with his shoulder pain. The pain is about his shoulder and scapula.   He presents today requesting bilateral cortisone injections.  Patient's spouse is in attendance today and participating in the history portion of the exam.  She reports she recently had a hip xray and would like explanation of the radiologist report.      PAST MEDICAL/SURGICAL HISTORY:  Past Medical History:   Diagnosis Date    Anemia     Colon polyps 8/14/2012    adenomatous-recheck in 2017    Diverticulosis     Hyperlipidemia LDL goal < 100     Hypertension     Primary insomnia 2/21/2018    He has insomnia.  His problem is more of a problem with not being able to go back to sleep when he awakens in the middle of the night.  He has not had problems on the ambien.     Past Surgical History:   Procedure Laterality Date    cardiac bypass      CARPAL TUNNEL RELEASE      COLONOSCOPY  2012    CORONARY ARTERY BYPASS GRAFT      HERNIA MESH REMOVAL      HERNIA REPAIR      NASAL SEPTUM SURGERY      septoplasty and SMR turbinoplasty     SD COLONOSCOPY,SHIKHA MONTESINOS/CAUTERY  8/14/2012         VASECTOMY      VASECTOMY         Current Medications:    Current Outpatient Medications:     acetaminophen (TYLENOL) 500 MG tablet, Take 2 tablets (1,000 mg total) by mouth every 6 (six) hours as needed for Pain., Disp: , Rfl: 0    amLODIPine (NORVASC) 10 MG tablet, Take 10 mg by mouth once daily., Disp: , Rfl:     aspirin 81 MG chewable tablet, once daily. , Disp: , Rfl:     BYSTOLIC 5 mg Tab, Take 5 mg by mouth once daily., Disp: , Rfl: 3    clopidogrel (PLAVIX) 75 mg tablet, TAKE ONE TABLET BY MOUTH ONCE DAILY, Disp: 90 tablet, Rfl: 1    diclofenac sodium (VOLTAREN) 1 % Gel, Apply 2 g topically 4 (four) times daily., Disp: 300 g, Rfl: 0    glucosamine-chondroitin 500-400 mg tablet, Take 1 tablet by mouth 2 (two) times daily., Disp: , Rfl:     rosuvastatin (CRESTOR) 40 MG Tab, Take 1 tablet (40 mg total) by mouth every evening., Disp: 30 tablet, Rfl: 11    traMADol (ULTRAM) 50 mg tablet, Take 1 tablet (50 mg total) by mouth every 6 (six) hours as needed., Disp: 40 tablet, Rfl: 5    zolpidem (AMBIEN) 5 MG Tab, TAKE ONE TABLET BY MOUTH ONCE DAILY at night AS NEEDED, Disp: 90 tablet, Rfl: 0    Family History: family history was reviewed and is noncontributory  Social History:   Social History     Socioeconomic History    Marital status:      Spouse name: Not on file    Number of children: Not on file    Years of education: Not on file    Highest education level: Not on file   Occupational History    Not on file   Social Needs    Financial resource strain: Not on file    Food insecurity:     Worry: Not on file     Inability: Not on file    Transportation needs:     Medical: Not on file     Non-medical: Not on file   Tobacco Use    Smoking status: Former Smoker     Last attempt to quit: 2/10/2008     Years since quittin.2    Smokeless tobacco: Never Used   Substance and Sexual Activity    Alcohol use: Yes     Alcohol/week: 1.8 oz     Types: 1 Glasses of wine, 2 Cans of beer per week     Comment: 2/day    Drug use: No    Sexual activity:  "Yes     Partners: Female   Lifestyle    Physical activity:     Days per week: Not on file     Minutes per session: Not on file    Stress: Not on file   Relationships    Social connections:     Talks on phone: Not on file     Gets together: Not on file     Attends Mandaeism service: Not on file     Active member of club or organization: Not on file     Attends meetings of clubs or organizations: Not on file     Relationship status: Not on file   Other Topics Concern    Not on file   Social History Narrative    Not on file       ROS:  Constitution: Negative for chills, fever, and sweats. Negative for unexplained weight loss.  Eyes: no redness, no discharge  Ears: no ear pain or tinnitus  Cardiovascular: Negative for chest pain, irregular heartbeat, leg swelling and palpitations.   Respiratory: Negative for cough and shortness of breath.   Gastrointestinal: Negative for abdominal pain, nausea and vomiting.   Genitourinary: Negative for bladder incontinence and dysuria.   Neurological: Negative for numbness.   Psychiatric/Behavioral: Negative for behavior changes.   Endocrine: Negative for palpitations.   Hematologic/Lymphatic: Negative for bleeding disorders.  Skin: Negative for pruritis or rash.       PHYSICAL EXAM:  Ht 5' 10" (1.778 m)   Wt 89.8 kg (198 lb)   BMI 28.41 kg/m²   Cruz Dover is a well developed, well nourished male in no acute distress. Physical examination of the bilateral shoulder evaluated the following:    Inspection, palpation and ROM of the cervical spine  Disc compression testing bilaterally  Inspection for swelling, ecchymosis, erythema, deformity and atrophy  Tenderness to palpation of the soft tissue and bony structures  Active and passive range of motion  Sensation of the shoulder and upper extremity  Motor strength in the deltoid, supraspinatus, internal rotators and external rotators  Impingement, apprehension, relocation and Speed's tests  Upper extremity vascular exam (skin " temp,color, capillary refill)  Inspection for pseudomotor signs    Remarkable findings included:  No edema, effusion, ecchymosis or obvious deformity  Nontender to palpation   ROM painful with forward flexion to almost 90, stiffness with internal and external rotation bilaterally  Strength not tested  Sensation intact  Skin warm, dry, intact      IMAGING:   X-ray obtained Bilateral shoulder performed today personally reviewed with patient. Radiologist report as follows:   No acute osseous abnormality.  Mild bilateral glenohumeral and AC joint degenerative changes present.  Lung parenchyma clear.    ASSESSMENT:   Bilateral rotator cuff tendonitis    PLAN:  The nature of the diagnosis, using models and diagrams when appropriate, was explained to the patient and his wife in detail. Treatment option discussed included non-operative measures of rest,  modification of activities, application of ice, physica/occupational therapy and cortisone injection.  More aggressive treatment options include referal to Dr Peters.  All questions answered and the patient wishes to proceed today with bilateral cortisone injections and referral to Dr Peters.  Bilateral shoulder cortisone injection performed today (see procedure documentation).  I have instructed to monitor injection site for signs and symptoms of inflammation/infection.  I have instructed to elevate and apply ice to shoulder this evening.  I have scheduled him a consultation with Dr Peters for his shoulders and also his wife for her bilateral hip pain.  Follow up if no improvement or worsening of symptoms.       Answers for HPI/ROS submitted by the patient on 5/19/2019   Arm pain  unexpected weight change: No  appetite change : No  sleep disturbance: No  IMMUNOCOMPROMISED: No  nervous/ anxious: Yes  dysphoric mood: No  rash: Yes  visual disturbance: No  eye redness: No  eye pain: No  ear pain: No  tinnitus: No  hearing loss: No  sinus pressure : No  nosebleeds:  No  enviro allergies: No  food allergies: No  cough: No  shortness of breath: No  sweating: No  frequency: No  difficulty urinating: No  hematuria: No  chest pain: No  palpitations: No  nausea: No  vomiting: No  diarrhea: No  blood in stool: No  constipation: No  headaches: No  dizziness: No  numbness: No  seizures: No  joint swelling: No  myalgia: Yes  weakness: Yes  back pain: No  Pain Chronicity: chronic  History of trauma: Yes  Onset: more than 1 month ago  Frequency: daily  Progression since onset: gradually worsening  Injury mechanism: collision/contact  injury location: at home  pain- numeric: 9/10  pain location: left shoulder, right shoulder, other  pain quality: aching, sharp  Radiating Pain: Yes  If your pain is radiating, to what part of the body?: left arm, right arm, left forearm, right forearm, upper back  Aggravating factors: activity, bearing weight, exercise, extension, flexion, twisting, lifting, lying down, rotation  fever: No  inability to bear weight: Yes  itching: No  joint locking: No  limited range of motion: Yes  stiffness: Yes  tingling: Yes  Treatments tried: exercise, movement, OTC ointments, OTC pain meds, rest  physical therapy: ineffective  Improvement on treatment: mild

## 2019-05-20 ENCOUNTER — PATIENT OUTREACH (OUTPATIENT)
Dept: ADMINISTRATIVE | Facility: HOSPITAL | Age: 69
End: 2019-05-20

## 2019-05-20 ENCOUNTER — HOSPITAL ENCOUNTER (OUTPATIENT)
Dept: RADIOLOGY | Facility: HOSPITAL | Age: 69
Discharge: HOME | End: 2019-05-20
Attending: NURSE PRACTITIONER
Payer: MEDICARE

## 2019-05-20 ENCOUNTER — OFFICE VISIT (OUTPATIENT)
Dept: ORTHOPEDICS | Facility: CLINIC | Age: 69
End: 2019-05-20
Payer: MEDICARE

## 2019-05-20 ENCOUNTER — OFFICE VISIT (OUTPATIENT)
Dept: FAMILY MEDICINE | Facility: CLINIC | Age: 69
End: 2019-05-20
Payer: MEDICARE

## 2019-05-20 VITALS
DIASTOLIC BLOOD PRESSURE: 74 MMHG | BODY MASS INDEX: 25.05 KG/M2 | WEIGHT: 175 LBS | SYSTOLIC BLOOD PRESSURE: 133 MMHG | HEIGHT: 70 IN | TEMPERATURE: 98 F | HEART RATE: 60 BPM

## 2019-05-20 VITALS — BODY MASS INDEX: 28.35 KG/M2 | WEIGHT: 198 LBS | HEIGHT: 70 IN

## 2019-05-20 DIAGNOSIS — M25.511 BILATERAL SHOULDER PAIN, UNSPECIFIED CHRONICITY: Primary | ICD-10-CM

## 2019-05-20 DIAGNOSIS — M25.511 ACUTE PAIN OF BOTH SHOULDERS: Primary | ICD-10-CM

## 2019-05-20 DIAGNOSIS — G82.20 FLACCID PARALYSIS OF LEGS: ICD-10-CM

## 2019-05-20 DIAGNOSIS — N31.2: ICD-10-CM

## 2019-05-20 DIAGNOSIS — M75.82 ROTATOR CUFF TENDONITIS, LEFT: ICD-10-CM

## 2019-05-20 DIAGNOSIS — M25.512 BILATERAL SHOULDER PAIN, UNSPECIFIED CHRONICITY: ICD-10-CM

## 2019-05-20 DIAGNOSIS — M25.512 ACUTE PAIN OF BOTH SHOULDERS: Primary | ICD-10-CM

## 2019-05-20 DIAGNOSIS — M25.511 BILATERAL SHOULDER PAIN, UNSPECIFIED CHRONICITY: ICD-10-CM

## 2019-05-20 DIAGNOSIS — S12.691D OTHER CLOSED NONDISPLACED FRACTURE OF SEVENTH CERVICAL VERTEBRA WITH ROUTINE HEALING, SUBSEQUENT ENCOUNTER: Primary | ICD-10-CM

## 2019-05-20 DIAGNOSIS — M25.512 BILATERAL SHOULDER PAIN, UNSPECIFIED CHRONICITY: Primary | ICD-10-CM

## 2019-05-20 DIAGNOSIS — M75.81 ROTATOR CUFF TENDONITIS, RIGHT: ICD-10-CM

## 2019-05-20 PROCEDURE — 99999 PR PBB SHADOW E&M-EST. PATIENT-LVL III: CPT | Mod: PBBFAC,HCNC,, | Performed by: FAMILY MEDICINE

## 2019-05-20 PROCEDURE — 3075F SYST BP GE 130 - 139MM HG: CPT | Mod: HCNC,CPTII,S$GLB, | Performed by: FAMILY MEDICINE

## 2019-05-20 PROCEDURE — 99214 PR OFFICE/OUTPT VISIT, EST, LEVL IV, 30-39 MIN: ICD-10-PCS | Mod: HCNC,S$GLB,, | Performed by: FAMILY MEDICINE

## 2019-05-20 PROCEDURE — 99214 OFFICE O/P EST MOD 30 MIN: CPT | Mod: HCNC,25,S$GLB, | Performed by: NURSE PRACTITIONER

## 2019-05-20 PROCEDURE — 99999 PR PBB SHADOW E&M-EST. PATIENT-LVL III: ICD-10-PCS | Mod: PBBFAC,HCNC,, | Performed by: NURSE PRACTITIONER

## 2019-05-20 PROCEDURE — 99499 UNLISTED E&M SERVICE: CPT | Mod: HCNC,S$GLB,, | Performed by: FAMILY MEDICINE

## 2019-05-20 PROCEDURE — 3078F PR MOST RECENT DIASTOLIC BLOOD PRESSURE < 80 MM HG: ICD-10-PCS | Mod: HCNC,CPTII,S$GLB, | Performed by: FAMILY MEDICINE

## 2019-05-20 PROCEDURE — 20610 LARGE JOINT ASPIRATION/INJECTION: R SUBACROMIAL BURSA, L SUBACROMIAL BURSA: ICD-10-PCS | Mod: 50,HCNC,S$GLB, | Performed by: NURSE PRACTITIONER

## 2019-05-20 PROCEDURE — 99499 RISK ADDL DX/OHS AUDIT: ICD-10-PCS | Mod: HCNC,S$GLB,, | Performed by: FAMILY MEDICINE

## 2019-05-20 PROCEDURE — 1101F PT FALLS ASSESS-DOCD LE1/YR: CPT | Mod: HCNC,CPTII,S$GLB, | Performed by: FAMILY MEDICINE

## 2019-05-20 PROCEDURE — 73030 X-RAY EXAM OF SHOULDER: CPT | Mod: 26,50,HCNC, | Performed by: RADIOLOGY

## 2019-05-20 PROCEDURE — 20610 DRAIN/INJ JOINT/BURSA W/O US: CPT | Mod: 50,HCNC,S$GLB, | Performed by: NURSE PRACTITIONER

## 2019-05-20 PROCEDURE — 99999 PR PBB SHADOW E&M-EST. PATIENT-LVL III: CPT | Mod: PBBFAC,HCNC,, | Performed by: NURSE PRACTITIONER

## 2019-05-20 PROCEDURE — 1101F PR PT FALLS ASSESS DOC 0-1 FALLS W/OUT INJ PAST YR: ICD-10-PCS | Mod: HCNC,CPTII,S$GLB, | Performed by: FAMILY MEDICINE

## 2019-05-20 PROCEDURE — 73030 XR SHOULDER TRAUMA 3 VIEW BILATERAL: ICD-10-PCS | Mod: 26,50,HCNC, | Performed by: RADIOLOGY

## 2019-05-20 PROCEDURE — 3078F DIAST BP <80 MM HG: CPT | Mod: HCNC,CPTII,S$GLB, | Performed by: FAMILY MEDICINE

## 2019-05-20 PROCEDURE — 99999 PR PBB SHADOW E&M-EST. PATIENT-LVL III: ICD-10-PCS | Mod: PBBFAC,HCNC,, | Performed by: FAMILY MEDICINE

## 2019-05-20 PROCEDURE — 99214 PR OFFICE/OUTPT VISIT, EST, LEVL IV, 30-39 MIN: ICD-10-PCS | Mod: HCNC,25,S$GLB, | Performed by: NURSE PRACTITIONER

## 2019-05-20 PROCEDURE — 3075F PR MOST RECENT SYSTOLIC BLOOD PRESS GE 130-139MM HG: ICD-10-PCS | Mod: HCNC,CPTII,S$GLB, | Performed by: FAMILY MEDICINE

## 2019-05-20 PROCEDURE — 99214 OFFICE O/P EST MOD 30 MIN: CPT | Mod: HCNC,S$GLB,, | Performed by: FAMILY MEDICINE

## 2019-05-20 PROCEDURE — 73030 X-RAY EXAM OF SHOULDER: CPT | Mod: TC,50,HCNC,PO

## 2019-05-20 RX ORDER — DOCUSATE SODIUM 100 MG/1
100 CAPSULE, LIQUID FILLED ORAL
COMMUNITY
Start: 2019-05-14 | End: 2019-05-24

## 2019-05-20 RX ORDER — HYDROXYZINE PAMOATE 25 MG/1
25 CAPSULE ORAL
COMMUNITY
Start: 2019-04-10 | End: 2019-05-24

## 2019-05-20 RX ORDER — IPRATROPIUM BROMIDE 0.5 MG/2.5ML
0.5 SOLUTION RESPIRATORY (INHALATION)
COMMUNITY
Start: 2019-04-10 | End: 2019-05-20 | Stop reason: ALTCHOICE

## 2019-05-20 RX ORDER — GABAPENTIN 300 MG/1
600 CAPSULE ORAL 3 TIMES DAILY
Refills: 3 | COMMUNITY
Start: 2019-05-15 | End: 2019-08-22

## 2019-05-20 RX ORDER — OXYBUTYNIN CHLORIDE 5 MG/1
5 TABLET, EXTENDED RELEASE ORAL
COMMUNITY
Start: 2019-05-14 | End: 2019-06-19 | Stop reason: ALTCHOICE

## 2019-05-20 RX ORDER — TRIAMCINOLONE ACETONIDE 40 MG/ML
40 INJECTION, SUSPENSION INTRA-ARTICULAR; INTRAMUSCULAR
Status: DISCONTINUED | OUTPATIENT
Start: 2019-05-20 | End: 2019-05-20 | Stop reason: HOSPADM

## 2019-05-20 RX ORDER — OXYCODONE HYDROCHLORIDE 10 MG/1
10 TABLET ORAL
COMMUNITY
Start: 2019-05-14 | End: 2019-08-27 | Stop reason: SDUPTHER

## 2019-05-20 RX ORDER — ALBUTEROL SULFATE 1.25 MG/3ML
1.25 SOLUTION RESPIRATORY (INHALATION)
COMMUNITY
Start: 2019-04-10 | End: 2019-05-20

## 2019-05-20 RX ADMIN — TRIAMCINOLONE ACETONIDE 40 MG: 40 INJECTION, SUSPENSION INTRA-ARTICULAR; INTRAMUSCULAR at 11:05

## 2019-05-20 NOTE — LETTER
May 20, 2019      Binh Cat MD  [address]  [place] [address]           [facility]  [address] [address]  Phone: [phone]          Patient: Cruz Dover   MR Number: [id number]   YOB: 1950   Date of Visit: 5/20/2019       Dear Dr. Binh Cat:    Thank you for referring Cruz Dover to me for evaluation. Attached you will find relevant portions of my assessment and plan of care.    If you have questions, please do not hesitate to call me. I look forward to following Cruz Dover along with you.    Sincerely,    Radha Isabel, APRN    Enclosure  CC:  No Recipients    If you would like to receive this communication electronically, please contact [email or web address] or [phone] to request more information on [employer] Link access.    For providers and/or their staff who would like to refer a patient to [facility], please contact us through our one-stop-shop provider referral line, Nikita Osorio, at [phone].    If you feel you have received this communication in error or would no longer like to receive these types of communications, please e-mail [email or web address]

## 2019-05-20 NOTE — PROGRESS NOTES
[facility] reviewed. Colonoscopy performed by Dr. Parker dated 8/13/2014 linked to .  updated.

## 2019-05-20 NOTE — PROGRESS NOTES
Subjective:      Patient ID: Cruz Dover is a 68 y.o. male.    Chief Complaint: Fall      The patient was recently hospitalized at [facility] for fractured neck.  The discharge summary from the hospitalization is copied below for reference and completeness.      Transitional Care Note    Family and/or Caretaker present at visit?  Yes.  Diagnostic tests reviewed/disposition: No diagnosic tests pending after this hospitalization.  Disease/illness education: he understands what he had done.  Home health/community services discussion/referrals: Patient has home health established at a local one.  he will let me know the name. .   Establishment or re-establishment of referral orders for community resources: No other necessary community resources.   Discussion with other health care providers: No discussion with other health care providers necessary.   He is getting PT and OT care now.   He is self cathing and he is also wheel chair bound as he has paralysis of the legs. He is also having to have rectal stimulation to have a BM.  He is getting a fully functional bed soon.  He does have a shower chair but he is getting a new one soon.  His wheel chair is also on order.  He has a hoya lift.      Problem List Items Addressed This Visit     None          Discharge Summaries  - documented in this encounter    Ej Swift NP - 04/12/2019 3:07 PM CDT  Formatting of this note might be different from the original.  [facility] DISCHARGE SUMMARY  Patient ID:  Cruz Dover  [id number]  68 y.o.  1950    Admit Date:   3/30/2019 6:25 PM    Discharge Date:   Prior Discharge Date: 4/10/2019 6:12 PM    Admitting Physician:   Michael Fahr, MD     Discharge Physician:   Dr. Fahr    Consults:  Consultants   Provider Service Role Specialty   Nafisa Vásquez MD Neurosurgery Consulting Physician Neurosurgery   Fahr, Michael, MD -- Consulting Physician Surgical Critical Care       Reason for Admission/Admission Diagnoses:    Present on Admission:   Paraplegia at T4 level (HCC)   Fall from height of greater than 3 feet   Acute pain due to trauma   (Resolved) Contusion of cervical cord, initial encounter ([facility])   (Resolved) Compression fracture of cervical spine (HCC)   (Resolved) Closed fracture of sixth cervical vertebra (HCC)   (Resolved) Closed fracture of seventh cervical vertebra (HCC)   (Resolved) Protrusion of cervical intervertebral disc   (Resolved) Spinal stenosis in cervical region   (Resolved) Cervical spinal cord compression (HCC)   (Resolved) Foraminal stenosis of cervical region   (Resolved) Paraspinal hematoma   (Resolved) Epidural hemorrhage without loss of consciousness (HCC)   (Resolved) Injury to ligament of cervical spine, initial encounter   (Resolved) Closed fracture of thoracic vertebra (HCC)   (Resolved) Hypokalemia   (Resolved) Essential hypertension   Diabetes mellitus type 2, controlled (HCC)   Smoker   (Resolved) Scalp laceration, initial encounter   (Resolved) Abrasions of multiple sites    Discharge Diagnoses:   Active Hospital Problems   Diagnosis Date Noted    Fall from height of greater than 3 feet 03/30/2019    Neurogenic orthostatic 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[facility]) 04/05/2019    S/P cervical spinal fusion 04/01/2019    Paraplegia at T4 level ([facility]) 03/30/2019    Acute pain due to trauma 03/30/2019    Diabetes mellitus type 2, controlled ([facility]) 03/30/2019   Chronic    Smoker 03/30/2019   Chronic     Resolved Hospital Problems   Diagnosis Date Noted Date Resolved    Scalp laceration, initial encounter 03/31/2019 04/04/2019    Abrasions of multiple sites 03/31/2019 04/04/2019    Contusion of cervical cord, initial encounter ([facility]) 03/30/2019 04/03/2019    Compression fracture of cervical spine ([facility]) 03/30/2019 04/03/2019    Closed fracture of sixth cervical vertebra (HCC) 03/30/2019 04/03/2019    Closed fracture of seventh cervical vertebra ([facility]) 03/30/2019 04/03/2019    Protrusion of  cervical intervertebral disc 03/30/2019 04/03/2019    Spinal stenosis in cervical region 03/30/2019 04/03/2019    Cervical spinal cord compression (HCC) 03/30/2019 04/03/2019    Foraminal stenosis of cervical region 03/30/2019 04/03/2019    Paraspinal hematoma 03/30/2019 04/03/2019    Epidural hemorrhage without loss of consciousness (HCC) 03/30/2019 04/03/2019    Injury to ligament of cervical spine, initial encounter 03/30/2019 04/03/2019    Closed fracture of thoracic vertebra (HCC) 03/30/2019 04/03/2019    Hypokalemia 03/30/2019 04/04/2019    Essential hypertension 03/30/2019 04/05/2019   Chronic     History of Present Illness:   Mr. Dover is a 69 y/o that fell out of a bucket of a tractor and fell approximately 7-10 feet, striking his back on the tractor and his head on the tractor tire. He presented to the ER with no movement to bilateral LE. He had a scalp laceration that was repaired. He was noted to have mild compression C7 fx, canal stenosis, and likely spinal cord contusion of C5-7. He was evaluated by Dr. Vásquez and admitted to the SICU.     Hospital Course and Treatment:   Admission Information   Date & Time  3/30/2019 Department  [facility] Ortho/Neurosurgery Dept. Phone  [phone]         He was started on pressors to keep MAP >90 mmHg. He was taken to the OR and underwent cervical, anterior discectomy/ fusion. He continued to require pressor support. He was evaluated by speech and diet resumed. He was evaluated by PT/OT. He was also seen and denied by CMR. His pressors were weaned. He was started on sudafed to assist with weaning of pressors. He was evaluated by [facility] and accepted. His insurance approved and he was transferred. He agreed with plan of care.     I discussed with the patient disease process and treatment.     I have personally seen and examined the patient, Cruz Dover, in a face to face encounter on the date of discharge.     He is cleared for discharge  with instructions to follow up as directed.   Total time in the care and discharge planning of this patient was less than 30 minutes.    Significant Diagnostic Studies:  Recent Imaging and Procedure Results   Procedure Component Value Ref Range Date/Time   Echo Complete [[id number]] Collected: 2019 1313   Updated: 2019 1705   PatientHeight 177.8 cm   PatientWeight 83.081354 kg   Systolic Pressure 110 mmHg   Diastolic Pressure 59 mmHg   StudyLocation [facility]   BSA 2.97500 m^2   2D/MMode measurements and calculations: MMode 2D Measurements & Calculations   Interventricular Septum in Diastole 0.524727 cm   Left Ventricle in Diastole 4.60037 cm   Left Ventricle in Systole 3.70973 cm   LV post wall in Diastole 0.071337 cm   IVS/LVPW 1.02844   FS 32.6452 %   EDV(Teich) 107.152 ml   ESV(Teich) 41.8444 ml   EF(Teich) 60.9485 %   EDV(cubed) 110.105 ml   ESV(cubed) 33.6446 ml   EF(cubed) 69.4433 %   LV mass(C)d 139.667 grams   LV mass(C)dI 69.4866 grams/m^2   SV(Teich) 65.3074 ml   SI(Teich) 32.4916 ml/m^2   SV(cubed) 76.4608 ml   SI(cubed) 38.0406 ml/m^2   Ao root diameter 3.93334 cm   Ao root area 7.48461 cm^2   LA Dimension 4.96963 cm   LA/Ao 1.61121   Doppler measurements and calculations: Doppler Measurements & Calculations   MV e max velocity 77.5732 cm/sec   MV a velocity 97.2808 cm/sec   MV e/a ratio 0.975398   MV Dec slope 260.949 cm/sec^2   MV dec time 0.143282 sec   Ao V2 Max 148.261 cm/sec   Ao max PG 8.15589 mmHg   TR max keith 243.004 cm/sec   EF MIN = 55 %   EF MAX = 60 %   Narrative:       [facility]  [address]  [place] [address]  [phone]    Adult Echocardiogram  Name: IVIS CHENG Study Date: 2019  MRN: [id number] Age: 68 yrs  [place] [facility] [id number] Height: 70 in  : 1950 Weight: 183 lb  Gender: Male BSA: 2.0 m2  Reason For Study: bradycardia  History: Bradycardia  BP: 110/59 mmHg    Ordering Physician: ANU DUKES  Performed By: Dawn Goncalves,  RCS    Interpretation Summary  The study was technically adequate.  Left ventricular systolic function is normal.  Ejection Fraction = 55-60%.  The study was technically adequate.    Measurements with Normals  IVSd: 0.87 cm (0.6-1.1 cm) LVIDd: 4.8 cm (3.7-5.6 cm)  LVPWd: 0.86 cm (0.6-1.1 cm) LVIDs: 3.2 cm (2.3-3.9 cm)  Ao root diam: 3.2 cm (2.0-3.7 cm)  LA dimension: 4.2 cm (1.9-4.0 cm)    MMode/2D Measurements \T\ Calculations  FS: 32.6 % Ao root area: 8.0 cm2  EDV(Teich): 107.2 ml  ESV(Teich): 41.8 ml  EF(Teich): 60.9 %    Doppler Measurements \T\ Calculations  MV E max keith: 77.6 cm/sec MV dec slope: 260.9 cm/sec2  MV A max keith: 97.3 cm/sec MV dec time: 0.30 sec  MV E/A: 0.80  Ao V2 max: 148.3 cm/sec TR max keith: 243.0 cm/sec  Ao max P.8 mmHg TR max P.7 mmHg    LEFT VENTRICLE    o The left ventricle is normal in size.  o Ejection Fraction = 55-60%.  o Left ventricular systolic function is normal.  o There is normal left ventricular wall thickness.    RIGHT VENTRICLE    o The right ventricle is normal in size and function.    ATRIA    o The left atrial size is normal.  o Right atrial size is normal.    MITRAL VALVE    o The mitral valve is normal in structure and function.  o There is trace mitral regurgitation.    TRICUSPID VALVE    o The tricuspid valve is normal in structure and function.  o There is mild tricuspid regurgitation.    AORTIC VALVE    o Normal tricuspid aortic valve.  o No aortic regurgitation is present.  o AV MAX PG 8.8 mmHg.    PULMONIC VALVE    o The pulmonic valve is not well seen, but is grossly normal.  o Trace pulmonic valvular regurgitation.    GREAT VESSELS    o The aortic root is normal size.  o Normal size aorta.  o The pulmonary artery is not well visualized.    PERICARDIUM/PLEURAL EFFUSION    o There is no pericardial effusion.  o There is no pleural effusion.    _______________________________________________________________________________    Electronically signed by:  Camilo Armstrong MD 04/09/2019 05:05 PM  InterpretingPhysician: Camilo Armstrong MD electronically signed on 2019-04-09 17:05:17.37   MRI Lumbar Spine WO Contrast [[id number]] Collected: 03/31/2019 0846   Updated: 03/31/2019 0856   Narrative:   REASON FOR EXAM: trauma     TECHNICAL FACTORS: Sagittal T2, sagittal T1, sagittal fat-suppressed T2 and axial T2 sequences were obtained of the lumbar spine without administration of intravenous contrast.     COMPARISON: None    FINDINGS: For spine numbering nomenclature, hypoplastic ribs are visualized on L1. There are 6 lumbar type vertebral bodies. Alignment is normal. Bone marrow is normal in signal intensity without focal lesion. The conus is normal in signal intensity.   The conus terminates at the L1-L2 level. Vertebral body and intervertebral disc heights are maintained. A T2 hyperintense lesion is present within the right ilium likely representing a hemangioma.    At the L1-2 level, there is no evidence of central canal stenosis or neural foraminal narrowing.    At the L2-3 level, mild disc bulge and facet hypertrophy are present resulting in mild bilateral neural foraminal narrowing without central stenosis.    At the L3-4 level, mild disc bulge and facet hypertrophy are present resulting in minimal bilateral neural foraminal narrowing without central canal stenosis.    At the L4-5 level, mild disc bulge and facet hypertrophy are present resulting in minimal bilateral neural foraminal narrowing without central canal stenosis.    At the L5-L6 level, disc bulge combined with ligamentum flavum and facet hypertrophy result in mild central canal stenosis and mild to moderate bilateral neural foraminal narrowing.    At the L6-S1 level, disc bulge and facet hypertrophy are present without central canal stenosis. There is minimal bilateral neural foraminal narrowing.    IMPRESSION:  1. No posttraumatic abnormality identified.  2. 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    Code A    Electronically signed by Jordon Connors MD on 3/31/2019 8:53 AM    MRI Thoracic Spine WO Contrast [[id number]] Collected: 03/31/2019 0832   Updated: 03/31/2019 0849   Narrative:   REASON FOR EXAM: trauma     TECHNICAL FACTORS: Multiplanar, multisequence MRI of the thoracic spine was performed without administration of intravenous contrast.     COMPARISON: None    FINDINGS: For spine numbering nomenclature, hypoplastic ribs are visualized on L1. There are 6 lumbar type vertebral bodies. Alignment is normal. No edema is present within the superior endplate of C7 consistent with an acute compression fracture. There   is mild loss of vertebral height at T7, T9 and T12 without marrow edema suggestive of chronic compression fractures. There is mild marrow edema within the superior endplate of T3 without loss of vertebral height. Edema is visualized within the cord at   the C7 level. There is mild loss of disc height at multiple lower thoracic levels. There is no evidence of central canal stenosis or neural foraminal narrowing at any level. Minimal disc bulge is present in T11-T12. T2 hypertense signal is present within   the posterior paraspinal musculature extending from the cervical level to the T8 level.     IMPRESSION:  1. Acute C7 compression fracture with cord edema at the C7 level suggestive of contusion.  2. Partial tears with intramuscular hemorrhage within the posterior paraspinous musculature.  3. Marrow edema within the superior endplate of T3 that may present an acute compression fracture without significant loss of vertebral body height.   4. Mild chronic compression fractures of T7, T9 and T12.  5. Mild degenerative disc disease of the lower thoracic spine.    Code A    Electronically signed by Jordon Connors MD on 3/31/2019 8:46 AM    MRI Cervical Spine  WO Contrast [[id number]] Collected: 03/31/2019 0811   Updated: 03/31/2019 0833   Narrative:   REASON FOR EXAM: trauma     TECHNICAL FACTORS: Sagittal T2, sagittal T1, sagittal STIR, axial T2 and axial 2-D MERGE sequences were obtained of the cervical spine without administration of intravenous contrast.    COMPARISON: CT cervical spine March 30, 2019    FINDINGS: Alignment is normal. Marrow edema is present within the superior endplate of C7 with minimal loss of vertebral height consistent with an acute compression fracture. Known fractures of the left lamina of C6 and left superior articular facet of   C7 are better appreciated on recent CT. There is disruption of the posterior longitudinal ligament and the ligamentum flavum at the C6-C7 level. There is subtle cord edema at the C6-C7 level suspicious for cord contusion. Disc heights appear maintained.   Hyperintense signal is visualized within the posterior paraspinous musculature suspicious for partial tears with intramuscular hemorrhage. T2 hyperintense signal also insinuates from the posterior paravertebral musculature between the posterior arch of   C1 and the spinous process of C2 into the epidural space between the thecal sac and the ligamenta flava from the C2-C5 levels concerning for a small epidural hematoma. The epidural hematoma measures up to 4 mm in thickness.     At the C2-3 level, minimal disc bulge effaces the thecal sac and contacts the cord without cord deformity. There is significant narrowing of the thecal sac secondary to the epidural hematoma posteriorly.    At the C3-4 level, left paracentral disc protrusion contacts and flattens the left ventral lateral aspect of the cord without cord signal abnormality.    At the C4-5 level, disc bulge effaces the thecal sac with a superimposed central disc protrusion contacting the cord without cord deformity or signal abnormality. Facet hypertrophy is also present without neural foraminal narrowing.    At  the C5-6 level, broad-based disc bulge effaces the thecal sac and contacts the cord resulting in mild cord flattening without cord signal abnormality. Facet hypertrophy is also present. There is moderate bilateral neural foraminal narrowing.    At the C6-7 level, broad-based disc bulge contacts and flattens the ventral aspect of the cord. There is subtle cord edema at this level. Facet hypertrophy is also present. There is moderate to severe left neural foraminal narrowing and mild right neural   foraminal narrowing.    At the C7-T1 level, there is no evidence of central canal stenosis or neural foraminal narrowing.    IMPRESSION:  1. Acute C7 compression fracture with probable traumatic disc bulge at C6-C7 contacting and flattening the cord with subtle cord edema suggestive of cord contusion.  2. Disruption of the posterior longitudinal ligament and ligamentum flavum at the C6-C7 level.  3. Small epidural hematoma between the C2-C5 levels, arising from hemorrhage in the posterior paraspinal musculature insinuating between the posterior arch of C1 and spinous process of C2 into the epidural space.  4. Other findings as described.    Findings were discussed with Dr. MICHAEL FAHR by [facility] on 3/30/2019 at 2203.    Code A    Electronically signed by oJrdon Connors MD on 3/31/2019 8:30 AM    CT Cervical Spine WO Contrast [[id number]] Collected: 03/30/2019 1924   Updated: 03/30/2019 1948   Narrative:   REASON FOR EXAM: Trauma     TECHNICAL FACTORS: Multiple contiguous axial CT images were obtained from the skull base to T1 vertebral body without administration of intravenous contrast. 2D reformatted imaged were obtained. Automated exposure control was utilized for radiation dose   reduction.     COMPARISON: None    FINDINGS: Nondisplaced fracture of the left lamina of C6 is present. There is also a nondisplaced fracture of the left superior articular facet of C7. Mild anterior wedging of the C7 vertebral body suspicious  for an acute compression fracture. There is   minimal widening of the C6-C7 intervertebral disc. Vertebral alignment is normal. Soft tissue structures are normal in appearance. Carotid artery calcifications are present.    IMPRESSION:  1. Nondisplaced fractures of left lamina of C6 and left superior articular facet of C7.   2. Mild acute compression fracture of C7. There is slight widening of the disc space at C6-C7 level. Findings could be secondary to loss of vertebral height of C7 or ligamentous injury. Consider MRI cervical spine for further evaluation.  3. Carotid artery disease.    Findings were discussed with Dr. Fahr on 3/30/2019 at 7:44 PM.    Electronically signed by Jordon Connors MD on 3/30/2019 7:45 PM    CT Chest Abdomen Pelvis W Contrast [[id number]] Collected: 03/30/2019 1933   Updated: 03/30/2019 1945   Narrative:   REASON FOR EXAM: Trauma     TECHNICAL FACTORS: Multiple contiguous axial CT images were obtained of the chest, abdomen and pelvis after administration of intravenous contrast. 2D reformatted images were performed. Automated exposure control was utilized for radiation dose   reduction.     COMPARISON: None    CHEST FINDINGS: Dependent atelectasis is present within the lower lobes and right middle lobe. There is no evidence of pleural effusion or pneumothorax. Postoperative changes of CABG are present. Heart is otherwise normal in appearance. There is no   evidence of lymphadenopathy. Mild compression fractures of T6, T8 and T11 are visualized. Spondylosis is present in the lower thoracic spine.    ABDOMEN FINDINGS: The liver, spleen, pancreas, adrenal glands, and kidneys are normal in appearance. Diverticula are visualized throughout the colon without evidence of diverticulitis. There is no evidence of free fluid or lymphadenopathy. Atheromatous   changes are present in the abdominal aorta. Facet osteoarthritis is present lower lumbar spine.    PELVIS FINDINGS: The urinary bladder and  prostate are normal in appearance. Diverticula are visualized within the sigmoid colon without evidence of diverticulitis. There is no evidence of free fluid or lymphadenopathy. Osseous structures are   unremarkable.     IMPRESSION:  1. Compression fractures of T6, T8 and T11 of uncertain age. MRI of the thoracic spine may be helpful for determining chronicity.  2. No evidence of solid organ injury.  3. Colonic diverticulosis.  4. Postoperative changes of CABG.        Electronically signed by Jordon Connors MD on 3/30/2019 7:42 PM    CT Head WO Contrast [[id number]] Collected: 03/30/2019 1922   Updated: 03/30/2019 1927   Narrative:   REASON FOR EXAM: Trauma     TECHNICAL FACTORS: 5 mm contiguous axial CT images were obtained from the foramen magnum to the skull vertex.     COMPARISON: None    FINDINGS: The ventricles are normal in size and position. There is no evidence of acute intracranial hemorrhage or infarct. There is no evidence of mass, mass effect, or midline shift. Right posterior parietal laceration with skin staples as visualized.   The visualized orbits are normal in appearance. Mucosal thickening is present in ethmoid air cells and frontal sinus. Osseous structures are unremarkable.     Impression:   1. No acute intracranial abnormality.   2. Right posterior parietal scalp laceration.  3. Ethmoid and frontal sinus disease.        Electronically signed by Jordon Connors MD on 3/30/2019 7:24 PM        Surgical Procedures during this visit:    Procedure(s):  DISCECTOMY/FUSION - CERVICAL - ANTERIOR 2 LEVEL C5-7  Date  4/1/2019  Primary Surgeon  Nafisa Vásquez MD  -------------------    Patient's Condition On Discharge:   Stable    Physical Exam   Constitutional: He is oriented to person, place, and time. He appears well-developed.   HENT:   Head: Normocephalic.   Eyes: EOM are normal.   Neck:   Collar in place   Cardiovascular: Normal rate.   Pulmonary/Chest: Effort normal.   Abdominal: Soft.    Slightly distended   Musculoskeletal:   +sensation, no movement to LE   Neurological: He is alert and oriented to person, place, and time.   Skin: Skin is warm and dry.   Nursing note and vitals reviewed.    Discharge Disposition:   Order for Discharge (From admission, onward)   Start Ordered   04/10/19 1508 Dc to [facility] Discharge Disposition to: Another Health Care Institution Not Defined Once   Comments: Dc to [facility]   Question: Discharge Disposition to Answer: Another Health Care Institution Not Defined   Start Status   04/10/19 1508 Completed Order ID: [id number]     04/10/19 1509   04/10/19 0000 Follow-up with PCP Schedule for: 1; Weeks   Question Answer Comment   Schedule for 1   when Weeks     04/10/19 1509   04/10/19 0000 Follow-up   Question: Physician Answer: Follow Up: Dr. Vásquez in 2-3 weeks   04/10/19 1509         Discharge Orders:    Immunizations Administered for This Admission   No immunizations given this admission.         Medication List     START taking these medications   albuterol 1.25 mg/3 mL nebulizer solution  Commonly known as: ACCUNEB  Take 3 mLs (1.25 mg total) by nebulization every 8 (eight) hours      * bisacodyl 5 mg EC tablet  Commonly known as: DULCOLAX  Take 2 tablets (10 mg total) by mouth daily as needed (No BM x48 hours)      * bisacodyl 10 mg suppository  Commonly known as: DULCOLAX  Place 1 suppository (10 mg total) rectally daily  Start taking on: 4/11/2019      dextromethorphan-guaifenesin  mg per 12 hr tablet  Commonly known as: MUCINEX DM  Take 1 tablet by mouth every 12 (twelve) hours      gabapentin 300 MG capsule  Commonly known as: NEURONTIN  Take 1 capsule (300 mg total) by mouth 3 (three) times daily      insulin regular 100 unit/mL injection  Commonly known as: HumuLIN-R,NovoLIN-R  Inject 0-10 Units into the skin 4 (four) times daily--before meals and at bedtime      ipratropium 0.02 % nebulizer solution  Commonly known as: ATROVENT  Take 2.5 mLs (0.5 mg  total) by nebulization every 8 (eight) hours      methocarbamol 750 MG tablet  Commonly known as: ROBAXIN  Take 1 tablet (750 mg total) by mouth every 8 (eight) hours as needed      * oxyCODONE-acetaminophen  mg per tablet  Commonly known as: PERCOCET  Take 1 tablet by mouth every 4 (four) hours as needed (mild pain)      * oxyCODONE-acetaminophen  mg per tablet  Commonly known as: PERCOCET  Take 2 tablets by mouth every 4 (four) hours as needed      polyethylene glycol 17 gram packet  Commonly known as: MIRALAX,GLYCOLAX  Take 17 g by mouth daily  Start taking on: 4/11/2019      pseudoephedrine 60 MG tablet  Commonly known as: SUDAFED  Take 2 tablets (120 mg total) by mouth every 6 (six) hours      tamsulosin 0.4 mg Cap  Commonly known as: FLOMAX  Take 1 capsule (0.4 mg total) by mouth daily after breakfast  Start taking on: 4/11/2019        * This list has 4 medication(s) that are the same as other medications prescribed for you. Read the directions carefully, and ask your doctor or other care provider to review them with you.         CONTINUE taking these medications   AMBIEN 5 MG tablet  Generic drug: zolpidem  Take 5 mg by mouth at bedtime nightly as needed for Sleep      aspirin EC 81 MG EC tablet  Commonly known as: ECOTRIN  Take 81 mg by mouth daily      clopidogrel 75 mg tablet  Commonly known as: PLAVIX  Take 75 mg by mouth daily      nebivolol 5 MG tablet  Commonly known as: BYSTOLIC  Take 5 mg by mouth daily      rosuvastatin 40 MG tablet  Commonly known as: CRESTOR  Take 40 mg by mouth nightly        STOP taking these medications   amLODIPine 10 MG tablet  Commonly known as: NORVASC          Where to Get Your Medications     Information about where to get these medications is not yet available   Ask your nurse or doctor about these medications  · albuterol 1.25 mg/3 mL nebulizer solution  · bisacodyl 10 mg suppository  · bisacodyl 5 mg EC tablet  · dextromethorphan-guaifenesin  mg per  12 hr tablet  · gabapentin 300 MG capsule  · insulin regular 100 unit/mL injection  · ipratropium 0.02 % nebulizer solution  · methocarbamol 750 MG tablet  · oxyCODONE-acetaminophen  mg per tablet  · oxyCODONE-acetaminophen  mg per tablet  · polyethylene glycol 17 gram packet  · pseudoephedrine 60 MG tablet  · tamsulosin 0.4 mg Cap      Discharge Orders   Future Labs/Procedures Expected by Expires   Activity: Per Rehab Recommendations As directed   Diet (Select type) Regular; Odell Ensure w/ brkf & supper As directed   Questions:   Diet Type: Regular   Other Restriction(s):   Liquid Consistency:   Sodium Restriction:   Fluid restriction:   Preferences: Odell Ensure w/ brkf & supper   Follow-up with PCP Schedule for: 1; Weeks As directed   Questions:   Schedule for: 1   when: Weeks   Follow-up As directed   Questions:   Physician: Follow Up: Dr. Vásquez in 2-3 weeks   Occupational Therapy Eval and Treat As directed   Physical Therapy Eval and Treat As directed   Respiratory Therapy As directed   Social Work Services As directed   Speech Therapy Eval and Treat As directed   Vital Signs Per Facility Protocol As directed   Weights Per Facility Protocol As directed           Electronically signed by Fahr, Michael, MD at 04/14/2019 5:43 PM CDT       Associated attestation - Fahr, Michael, MD - 04/14/2019 5:43 PM CDT    I have reviewed the notes, assessments, and/or procedures performed by Ej Swift NP  , I concur with her/his documentation of Cruz Dover.  On my exam on 4/12 he was awake and alert, without acute neuro changes. To rehab when bed available    Discharge Instructions  - documented in this encounter    Instructions  Ced Covarrubias , PTA - 04/04/2019    Formatting of this note might be different from the original.  Acute Physical Therapy Progress Summary    Patient: Cruz Dover  PETROS#: [id number]  YOB: 1950     Todays Date: 4/4/2019    Gait  Status at Evaluation Current Status    Level of Assistance (Unable to Assess) (pt already in chair)     Transfers Status at Evaluation Current Status   Supine to Sit Unable to Assess Max Assist, Assist x 2   Sit to Supine (Unable to Assess) Max Assist, Assist x 3     Bed to Chair Unable to Assess Total Assist, Assist x 4   Chair to Bed Unable to Assess Total Assist, Assist x 4     Sit to Stand Unable to assess Unable to assess   Stand to Sit Unable to assess Unable to assess       Discharge Planning  Discharge Equipment Recommendations: To Be Determined  Discharge Therapy Recommendations: PT, OT  Discharge Setting Recommendations: (inpatient neuro rehab)    Therapist Comments:  PROM preformed. Pt tolerated tx well.     Electronically signed by:  Ced Covarrubias, , PTA  4/4/2019  1:49 PM           Medications at Time of Discharge  - documented as of this encounter    Medication Sig Dispensed Refills Start Date End Date   albuterol (ACCUNEB) 1.25 mg/3 mL nebulizer solution   Take 3 mLs (1.25 mg total) by nebulization every 8 (eight) hours   0 04/10/2019     ALPRAZolam (XANAX) 0.5 MG tablet   Take 0.5 mg by mouth 2 (two) times daily.    5 04/06/2016     aspirin EC (ECOTRIN) 81 MG EC tablet   Take 81 mg by mouth daily.   0       aspirin EC (ECOTRIN) 81 MG EC tablet   Take 81 mg by mouth daily   0       atorvastatin (LIPITOR) 40 MG tablet   Take 40 mg by mouth daily.    3 04/11/2016     bisacodyl (DULCOLAX) 10 mg suppository   Place 1 suppository (10 mg total) rectally daily   0 04/11/2019     bisacodyl (DULCOLAX) 5 mg EC tablet   Take 2 tablets (10 mg total) by mouth daily as needed (No BM x48 hours)   0 04/10/2019     clopidogrel (PLAVIX) 75 mg tablet   Take 75 mg by mouth daily.    3 01/28/2016     clopidogrel (PLAVIX) 75 mg tablet   Take 75 mg by mouth daily   0       dextromethorphan-guaifenesin (MUCINEX DM)  mg per 12 hr tablet   Take 1 tablet by mouth every 12 (twelve) hours   0 04/10/2019     gabapentin (NEURONTIN) 300 MG capsule   Take 1  capsule (300 mg total) by mouth 3 (three) times daily   0 04/10/2019     insulin regular (HUMULIN-R,NOVOLIN-R) 100 unit/mL injection   Inject 0-10 Units into the skin 4 (four) times daily--before meals and at bedtime   0 04/10/2019     ipratropium (ATROVENT) 0.02 % nebulizer solution   Take 2.5 mLs (0.5 mg total) by nebulization every 8 (eight) hours   0 04/10/2019     methocarbamol (ROBAXIN) 750 MG tablet   Take 1 tablet (750 mg total) by mouth every 8 (eight) hours as needed   0 04/10/2019     metoprolol tartrate (LOPRESSOR) 25 MG tablet   Take 25 mg by mouth daily.    3 04/11/2016     nebivolol (BYSTOLIC) 5 MG tablet    Indications: hypertension Take 5 mg by mouth daily   0       oxyCODONE-acetaminophen (PERCOCET)  mg per tablet   Take 1 tablet by mouth every 4 (four) hours as needed (mild pain)   0 04/10/2019     oxyCODONE-acetaminophen (PERCOCET)  mg per tablet   Take 2 tablets by mouth every 4 (four) hours as needed   0 04/10/2019     polyethylene glycol (MIRALAX,GLYCOLAX) 17 gram packet   Take 17 g by mouth daily   0 04/11/2019     pseudoephedrine (SUDAFED) 60 MG tablet   Take 2 tablets (120 mg total) by mouth every 6 (six) hours   0 04/10/2019     rosuvastatin (CRESTOR) 40 MG tablet   Take 40 mg by mouth nightly   0       tamsulosin (FLOMAX) 0.4 mg Cap   Take 1 capsule (0.4 mg total) by mouth daily after breakfast   0 04/11/2019     zolpidem (AMBIEN) 10 mg tablet   Take 10 mg by mouth at bedtime nightly as needed.    1 03/10/2016     zolpidem (AMBIEN) 5 MG tablet   Take 5 mg by mouth at bedtime nightly as needed for Sleep   0       Ordered Prescriptions  - documented in this encounter    Prescription Sig Dispensed Refills Start Date End Date   tamsulosin (FLOMAX) 0.4 mg Cap   Take 1 capsule (0.4 mg total) by mouth daily after breakfast   0 04/11/2019     pseudoephedrine (SUDAFED) 60 MG tablet   Take 2 tablets (120 mg total) by mouth every 6 (six) hours   0 04/10/2019     polyethylene glycol  (MIRALAX,GLYCOLAX) 17 gram packet   Take 17 g by mouth daily   0 04/11/2019     oxyCODONE-acetaminophen (PERCOCET)  mg per tablet   Take 2 tablets by mouth every 4 (four) hours as needed   0 04/10/2019     oxyCODONE-acetaminophen (PERCOCET)  mg per tablet   Take 1 tablet by mouth every 4 (four) hours as needed (mild pain)   0 04/10/2019     methocarbamol (ROBAXIN) 750 MG tablet   Take 1 tablet (750 mg total) by mouth every 8 (eight) hours as needed   0 04/10/2019     insulin regular (HUMULIN-R,NOVOLIN-R) 100 unit/mL injection   Inject 0-10 Units into the skin 4 (four) times daily--before meals and at bedtime   0 04/10/2019     gabapentin (NEURONTIN) 300 MG capsule   Take 1 capsule (300 mg total) by mouth 3 (three) times daily   0 04/10/2019     dextromethorphan-guaifenesin (MUCINEX DM)  mg per 12 hr tablet   Take 1 tablet by mouth every 12 (twelve) hours   0 04/10/2019     bisacodyl (DULCOLAX) 10 mg suppository   Place 1 suppository (10 mg total) rectally daily   0 04/11/2019     bisacodyl (DULCOLAX) 5 mg EC tablet   Take 2 tablets (10 mg total) by mouth daily as needed (No BM x48 hours)   0 04/10/2019     ipratropium (ATROVENT) 0.02 % nebulizer solution   Take 2.5 mLs (0.5 mg total) by nebulization every 8 (eight) hours   0 04/10/2019     albuterol (ACCUNEB) 1.25 mg/3 mL nebulizer solution   Take 3 mLs (1.25 mg total) by nebulization every 8 (eight) hours   0 04/10/2019     Discharge Disposition  - documented in this encounter    Disposition Code Departure Means Destination Comments    Rehab Facility ([facility] Other)     Other [facility]           Past Medical History:  Past Medical History:   Diagnosis Date    Anemia     Colon polyps 8/14/2012    adenomatous-recheck in 2017    Diverticulosis     Hyperlipidemia LDL goal < 100     Hypertension     Primary insomnia 2/21/2018    He has insomnia.  His problem is more of a problem with not being able to go back to sleep when he awakens in the middle of  the night.  He has not had problems on the ambien.     Past Surgical History:   Procedure Laterality Date    cardiac bypass      CARPAL TUNNEL RELEASE      COLONOSCOPY  2012    CORONARY ARTERY BYPASS GRAFT      HERNIA MESH REMOVAL      HERNIA REPAIR      NASAL SEPTUM SURGERY      septoplasty and SMR turbinoplasty     [place] COLONOSCOPY,SHIKHA MONTESINOS/CAUTERCLAYTON  8/14/2012         VASECTOMY      VASECTOMY       Review of patient's allergies indicates:   Allergen Reactions    Penicillin g     Penicillins      Other reaction(s): Unknown     Current Outpatient Medications on File Prior to Visit   Medication Sig Dispense Refill    aspirin 81 MG chewable tablet once daily.       BYSTOLIC 5 mg Tab Take 5 mg by mouth once daily.  3    clopidogrel (PLAVIX) 75 mg tablet TAKE ONE TABLET BY MOUTH ONCE DAILY 90 tablet 1    diclofenac sodium (VOLTAREN) 1 % Gel Apply 2 g topically 4 (four) times daily. 300 g 0    glucosamine-chondroitin 500-400 mg tablet Take 1 tablet by mouth 2 (two) times daily.      [DISCONTINUED] acetaminophen (TYLENOL) 500 MG tablet Take 2 tablets (1,000 mg total) by mouth every 6 (six) hours as needed for Pain.  0    [DISCONTINUED] amLODIPine (NORVASC) 10 MG tablet Take 10 mg by mouth once daily.      [DISCONTINUED] rosuvastatin (CRESTOR) 40 MG Tab Take 1 tablet (40 mg total) by mouth every evening. 30 tablet 11    [DISCONTINUED] traMADol (ULTRAM) 50 mg tablet Take 1 tablet (50 mg total) by mouth every 6 (six) hours as needed. 40 tablet 5    [DISCONTINUED] zolpidem (AMBIEN) 5 MG Tab TAKE ONE TABLET BY MOUTH ONCE DAILY at night AS NEEDED 90 tablet 0     No current facility-administered medications on file prior to visit.      Social History     Socioeconomic History    Marital status:      Spouse name: Not on file    Number of children: Not on file    Years of education: Not on file    Highest education level: Not on file   Occupational History    Not on file   Social Needs     Financial resource strain: Not on file    Food insecurity:     Worry: Not on file     Inability: Not on file    Transportation needs:     Medical: Not on file     Non-medical: Not on file   Tobacco Use    Smoking status: Former Smoker     Last attempt to quit: 2/10/2008     Years since quittin.2    Smokeless tobacco: Never Used   Substance and Sexual Activity    Alcohol use: Yes     Alcohol/week: 1.8 oz     Types: 1 Glasses of wine, 2 Cans of beer per week     Comment: 2/day    Drug use: No    Sexual activity: Yes     Partners: Female   Lifestyle    Physical activity:     Days per week: Not on file     Minutes per session: Not on file    Stress: Not on file   Relationships    Social connections:     Talks on phone: Not on file     Gets together: Not on file     Attends Congregational service: Not on file     Active member of club or organization: Not on file     Attends meetings of clubs or organizations: Not on file     Relationship status: Not on file   Other Topics Concern    Not on file   Social History Narrative    Not on file     Family History   Problem Relation Age of Onset    Diabetes Mother     Cancer Mother     Diabetes Sister     Cancer Paternal Uncle     Colon cancer Neg Hx        Review of Systems   Constitutional: Positive for activity change. Negative for unexpected weight change.   HENT: Negative for hearing loss, rhinorrhea and trouble swallowing.    Eyes: Negative for discharge and visual disturbance.   Respiratory: Negative for chest tightness and wheezing.    Cardiovascular: Negative for chest pain and palpitations.   Gastrointestinal: Negative for blood in stool, constipation, diarrhea and vomiting.        He is using a bowel treatment every other day.  He uses a gel and then a finger stimulation to allow him to have a BM.     Endocrine: Positive for polydipsia and polyuria.   Genitourinary: Positive for difficulty urinating (he is self cathing now.) and urgency. Negative for  "hematuria.   Musculoskeletal: Positive for neck pain. Negative for arthralgias and joint swelling.   Neurological: Positive for weakness (he is paralyzed from the waist down.). Negative for headaches.   Psychiatric/Behavioral: Negative for confusion and dysphoric mood.       Objective:     /74   Pulse 60   Temp 97.7 °F (36.5 °C) (Oral)   Ht 5' 10" (1.778 m)   Wt 79.4 kg (175 lb)   BMI 25.11 kg/m²     Physical Exam   Constitutional: He is oriented to person, place, and time. He appears well-developed and well-nourished.   HENT:   Head: Normocephalic and atraumatic.   Right Ear: External ear normal.   Left Ear: External ear normal.   Nose: Nose normal.   Mouth/Throat: Oropharynx is clear and moist. No oropharyngeal exudate.   Eyes: Pupils are equal, round, and reactive to light. Conjunctivae and EOM are normal. Right eye exhibits no discharge. Left eye exhibits no discharge. No scleral icterus.   Neck: Normal range of motion. Neck supple. No JVD present. No thyromegaly present.   Cardiovascular: Normal rate, regular rhythm, normal heart sounds and intact distal pulses. Exam reveals no gallop and no friction rub.   No murmur heard.  Pulmonary/Chest: Effort normal and breath sounds normal. No respiratory distress. He has no wheezes. He has no rales. He exhibits no tenderness.   Abdominal: Soft. Bowel sounds are normal. He exhibits no distension and no mass. There is no tenderness. There is no rebound and no guarding.   Musculoskeletal: He exhibits no edema.   Lymphadenopathy:     He has no cervical adenopathy.   Neurological: He is alert and oriented to person, place, and time. No cranial nerve deficit or sensory deficit.   He has paralysis of the legs.   Skin: Skin is warm and dry. He is not diaphoretic.   Psychiatric: He has a normal mood and affect.     Assessment:     1. Other closed nondisplaced fracture of seventh cervical vertebra with routine healing, subsequent encounter    2. Bladder paralysis    3. " Flaccid paralysis of legs        Plan:     Problem List Items Addressed This Visit     None      Visit Diagnoses     Other closed nondisplaced fracture of seventh cervical vertebra with routine healing, subsequent encounter    -  Primary    Bladder paralysis        Relevant Orders    Ambulatory referral to Urology    Flaccid paralysis of legs            Continue with plans to use the home health.   See the urologist for long term care on his self cathing.    meds reviewed.  No updates are needed now.    No follow-ups on file.

## 2019-05-20 NOTE — PROCEDURES
Large Joint Aspiration/Injection: R subacromial bursa, L subacromial bursa  Date/Time: 5/20/2019 11:31 AM  Performed by: GRADY Fernández  Authorized by: GRADY Fernández     Consent Done?:  Yes (Verbal)  Indications:  Pain  Timeout: Prior to procedure the correct patient, procedure, and site was verified      Location:  Shoulder  Site:  R subacromial bursa and L subacromial bursa  Prep: Patient was prepped and draped in usual sterile fashion    Ultrasonic Guidance for needle placement: No  Needle size:  25 G  Medications:  40 mg triamcinolone acetonide 40 mg/mL; 40 mg triamcinolone acetonide 40 mg/mL  Patient tolerance:  Patient tolerated the procedure well with no immediate complications

## 2019-05-22 ENCOUNTER — PATIENT OUTREACH (OUTPATIENT)
Dept: ADMINISTRATIVE | Facility: HOSPITAL | Age: 69
End: 2019-05-22

## 2019-05-23 ENCOUNTER — TELEPHONE (OUTPATIENT)
Dept: ADMINISTRATIVE | Facility: CLINIC | Age: 69
End: 2019-05-23

## 2019-05-29 ENCOUNTER — OFFICE VISIT (OUTPATIENT)
Dept: UROLOGY | Facility: CLINIC | Age: 69
End: 2019-05-29
Payer: MEDICARE

## 2019-05-29 ENCOUNTER — INITIAL CONSULT (OUTPATIENT)
Dept: PHYSICAL MEDICINE AND REHAB | Facility: CLINIC | Age: 69
End: 2019-05-29
Payer: MEDICARE

## 2019-05-29 VITALS
HEART RATE: 59 BPM | WEIGHT: 175.06 LBS | HEIGHT: 70 IN | SYSTOLIC BLOOD PRESSURE: 131 MMHG | BODY MASS INDEX: 25.06 KG/M2 | DIASTOLIC BLOOD PRESSURE: 83 MMHG

## 2019-05-29 VITALS
HEIGHT: 70 IN | DIASTOLIC BLOOD PRESSURE: 83 MMHG | SYSTOLIC BLOOD PRESSURE: 131 MMHG | WEIGHT: 175 LBS | BODY MASS INDEX: 25.05 KG/M2 | HEART RATE: 59 BPM

## 2019-05-29 DIAGNOSIS — M25.512 CHRONIC PAIN OF BOTH SHOULDERS: Primary | ICD-10-CM

## 2019-05-29 DIAGNOSIS — N31.9 NEUROGENIC BLADDER: Primary | ICD-10-CM

## 2019-05-29 DIAGNOSIS — R33.9 URINARY RETENTION: ICD-10-CM

## 2019-05-29 DIAGNOSIS — G89.29 CHRONIC PAIN OF BOTH SHOULDERS: Primary | ICD-10-CM

## 2019-05-29 DIAGNOSIS — M25.511 CHRONIC PAIN OF BOTH SHOULDERS: Primary | ICD-10-CM

## 2019-05-29 PROCEDURE — 3079F DIAST BP 80-89 MM HG: CPT | Mod: HCNC,CPTII,S$GLB, | Performed by: PHYSICAL MEDICINE & REHABILITATION

## 2019-05-29 PROCEDURE — 3075F SYST BP GE 130 - 139MM HG: CPT | Mod: HCNC,CPTII,S$GLB, | Performed by: PHYSICAL MEDICINE & REHABILITATION

## 2019-05-29 PROCEDURE — 3075F PR MOST RECENT SYSTOLIC BLOOD PRESS GE 130-139MM HG: ICD-10-PCS | Mod: HCNC,CPTII,S$GLB, | Performed by: PHYSICAL MEDICINE & REHABILITATION

## 2019-05-29 PROCEDURE — 99203 PR OFFICE/OUTPT VISIT, NEW, LEVL III, 30-44 MIN: ICD-10-PCS | Mod: HCNC,S$GLB,, | Performed by: UROLOGY

## 2019-05-29 PROCEDURE — 3075F SYST BP GE 130 - 139MM HG: CPT | Mod: HCNC,CPTII,S$GLB, | Performed by: UROLOGY

## 2019-05-29 PROCEDURE — 3079F PR MOST RECENT DIASTOLIC BLOOD PRESSURE 80-89 MM HG: ICD-10-PCS | Mod: HCNC,CPTII,S$GLB, | Performed by: PHYSICAL MEDICINE & REHABILITATION

## 2019-05-29 PROCEDURE — 3075F PR MOST RECENT SYSTOLIC BLOOD PRESS GE 130-139MM HG: ICD-10-PCS | Mod: HCNC,CPTII,S$GLB, | Performed by: UROLOGY

## 2019-05-29 PROCEDURE — 99204 PR OFFICE/OUTPT VISIT, NEW, LEVL IV, 45-59 MIN: ICD-10-PCS | Mod: HCNC,S$GLB,, | Performed by: PHYSICAL MEDICINE & REHABILITATION

## 2019-05-29 PROCEDURE — 3079F DIAST BP 80-89 MM HG: CPT | Mod: HCNC,CPTII,S$GLB, | Performed by: UROLOGY

## 2019-05-29 PROCEDURE — 99999 PR PBB SHADOW E&M-EST. PATIENT-LVL III: ICD-10-PCS | Mod: PBBFAC,HCNC,, | Performed by: UROLOGY

## 2019-05-29 PROCEDURE — 99203 OFFICE O/P NEW LOW 30 MIN: CPT | Mod: HCNC,S$GLB,, | Performed by: UROLOGY

## 2019-05-29 PROCEDURE — 3079F PR MOST RECENT DIASTOLIC BLOOD PRESSURE 80-89 MM HG: ICD-10-PCS | Mod: HCNC,CPTII,S$GLB, | Performed by: UROLOGY

## 2019-05-29 PROCEDURE — 99999 PR PBB SHADOW E&M-EST. PATIENT-LVL III: CPT | Mod: PBBFAC,HCNC,, | Performed by: UROLOGY

## 2019-05-29 PROCEDURE — 1101F PT FALLS ASSESS-DOCD LE1/YR: CPT | Mod: HCNC,CPTII,S$GLB, | Performed by: PHYSICAL MEDICINE & REHABILITATION

## 2019-05-29 PROCEDURE — 99499 UNLISTED E&M SERVICE: CPT | Mod: HCNC,S$GLB,, | Performed by: PHYSICAL MEDICINE & REHABILITATION

## 2019-05-29 PROCEDURE — 1101F PR PT FALLS ASSESS DOC 0-1 FALLS W/OUT INJ PAST YR: ICD-10-PCS | Mod: HCNC,CPTII,S$GLB, | Performed by: PHYSICAL MEDICINE & REHABILITATION

## 2019-05-29 PROCEDURE — 99999 PR PBB SHADOW E&M-EST. PATIENT-LVL III: ICD-10-PCS | Mod: PBBFAC,HCNC,, | Performed by: PHYSICAL MEDICINE & REHABILITATION

## 2019-05-29 PROCEDURE — 1101F PT FALLS ASSESS-DOCD LE1/YR: CPT | Mod: HCNC,CPTII,S$GLB, | Performed by: UROLOGY

## 2019-05-29 PROCEDURE — 1101F PR PT FALLS ASSESS DOC 0-1 FALLS W/OUT INJ PAST YR: ICD-10-PCS | Mod: HCNC,CPTII,S$GLB, | Performed by: UROLOGY

## 2019-05-29 PROCEDURE — 99204 OFFICE O/P NEW MOD 45 MIN: CPT | Mod: HCNC,S$GLB,, | Performed by: PHYSICAL MEDICINE & REHABILITATION

## 2019-05-29 PROCEDURE — 99999 PR PBB SHADOW E&M-EST. PATIENT-LVL III: CPT | Mod: PBBFAC,HCNC,, | Performed by: PHYSICAL MEDICINE & REHABILITATION

## 2019-05-29 PROCEDURE — 99499 RISK ADDL DX/OHS AUDIT: ICD-10-PCS | Mod: HCNC,S$GLB,, | Performed by: PHYSICAL MEDICINE & REHABILITATION

## 2019-05-29 RX ORDER — ZOLPIDEM TARTRATE 5 MG/1
5 TABLET ORAL NIGHTLY PRN
COMMUNITY
End: 2019-07-03 | Stop reason: SDUPTHER

## 2019-05-29 RX ORDER — HYDROXYZINE PAMOATE 25 MG/1
25 CAPSULE ORAL EVERY 8 HOURS PRN
COMMUNITY
End: 2021-05-19

## 2019-05-29 NOTE — LETTER
May 29, 2019      Binh Cat MD  [address] [address] [place] [address]           [facility]  [address] [facility] [address] [place] [address]  Phone: [phone]  Fax: [phone]          Patient: Cruz Dover   MR Number: [id number]   YOB: 1950   Date of Visit: 5/29/2019       Dear Dr. Binh Cat:    Thank you for referring Cruz Dover to me for evaluation. Attached you will find relevant portions of my assessment and plan of care.    If you have questions, please do not hesitate to call me. I look forward to following Cruz Dover along with you.    Sincerely,    SWAPNA Ratliff MD    Enclosure  CC:  No Recipients    If you would like to receive this communication electronically, please contact [email or web address] or [phone] to request more information on [employer] Link access.    For providers and/or their staff who would like to refer a patient to [facility], please contact us through our one-stop-shop provider referral line, [facility], at [phone].    If you feel you have received this communication in error or would no longer like to receive these types of communications, please e-mail [email or web address]

## 2019-05-29 NOTE — PROGRESS NOTES
Subjective:       Patient ID: Cruz Dover is a 68 y.o. male.    Chief Complaint: Bladder paralysis (consult)    HPI     68 year old with traumatic spinal cord injury last month.  C4-C6.  He was recently discharged from [facility] rehab and is home for only 10 days.  He is being catheterized every 4 hours.  His wife is doing the catheterization.  He is unable to self cath due to neck brace and unsure if he will had the manual dexterity.  He is having some incontinence between caths.       Past Medical History:   Diagnosis Date    Anemia     Colon polyps 8/14/2012    adenomatous-recheck in 2017    Diverticulosis     Hyperlipidemia LDL goal < 100     Hypertension     Primary insomnia 2/21/2018    He has insomnia.  His problem is more of a problem with not being able to go back to sleep when he awakens in the middle of the night.  He has not had problems on the ambien.     Past Surgical History:   Procedure Laterality Date    cardiac bypass      CARPAL TUNNEL RELEASE      COLONOSCOPY  2012    CORONARY ARTERY BYPASS GRAFT      HERNIA MESH REMOVAL      HERNIA REPAIR      NASAL SEPTUM SURGERY      septoplasty and SMR turbinoplasty     [place] COLONOSCOPY,REMSTEPHANIE SUÁREZ,FORCEP/CAUTERY  8/14/2012         VASECTOMY      VASECTOMY         Current Outpatient Medications:     acidophilus-sporogenes 35 million- 25 million cell Tab, Take 1 tablet by mouth., Disp: , Rfl:     aspirin 81 MG chewable tablet, once daily. , Disp: , Rfl:     BYSTOLIC 5 mg Tab, Take 5 mg by mouth once daily., Disp: , Rfl: 3    clopidogrel (PLAVIX) 75 mg tablet, TAKE ONE TABLET BY MOUTH ONCE DAILY, Disp: 90 tablet, Rfl: 1    dextromethorphan-guaifenesin  mg (MUCINEX DM)  mg per 12 hr tablet, Take 1 tablet by mouth., Disp: , Rfl:     diclofenac sodium (VOLTAREN) 1 % Gel, Apply 2 g topically 4 (four) times daily., Disp: 300 g, Rfl: 0    gabapentin (NEURONTIN) 300 MG capsule, Take 600 mg by mouth 3 (three) times daily., Disp: , Rfl:  3    glucosamine-chondroitin 500-400 mg tablet, Take 1 tablet by mouth 2 (two) times daily., Disp: , Rfl:     hydrOXYzine pamoate (VISTARIL) 25 MG Cap, Take 25 mg by mouth every 8 (eight) hours as needed., Disp: , Rfl:     oxybutynin (DITROPAN-XL) 5 MG TR24, Take 5 mg by mouth., Disp: , Rfl:     oxyCODONE (ROXICODONE) 10 mg Tab immediate release tablet, Take 10 mg by mouth., Disp: , Rfl:     zolpidem (AMBIEN) 5 MG Tab, Take 5 mg by mouth nightly as needed., Disp: , Rfl:       Review of Systems   Constitutional: Negative for fever.   Eyes: Negative for visual disturbance.   Respiratory: Negative for shortness of breath.    Cardiovascular: Negative for chest pain.   Gastrointestinal: Negative for nausea and vomiting.   Genitourinary: Negative for dysuria and hematuria.   Musculoskeletal: Positive for arthralgias and gait problem.   Skin: Negative for rash.   Neurological: Negative for seizures.   Psychiatric/Behavioral: Negative for confusion.       Objective:      Physical Exam   Constitutional: He is oriented to person, place, and time. He appears well-developed and well-nourished.   HENT:   Head: Normocephalic.   Eyes: Conjunctivae are normal.   Cardiovascular: Normal rate.   Pulmonary/Chest: Effort normal.   Abdominal: Soft. He exhibits no distension. There is no tenderness.   Musculoskeletal: He exhibits no edema.   In wheelchair.     Neurological: He is alert and oriented to person, place, and time.   Skin: Skin is warm and dry. No rash noted.   Psychiatric: He has a normal mood and affect.   Vitals reviewed.      Assessment:       1. Neurogenic bladder    2. Urinary retention        Plan:       Neurogenic bladder    Urinary retention      I recommend indwelling marino for now.  He is still recovering from his stabilization surgery.  He may be able to begin self caths in 1-2 months if necessary.  Orders given to home health for marino placement and exchange every 3-4 weeks

## 2019-05-29 NOTE — LETTER
May 29, 2019      Radha Isabel, APRN  [address] [facility] [address] [place] [address]           [facility]  [address] [facility] [address] [place] [address]  Phone: [phone]  Fax: [phone]          Patient: Cruz Dover   MR Number: [id number]   YOB: 1950   Date of Visit: 5/29/2019       Dear Radha Isabel:    Thank you for referring Cruz Dover to me for evaluation. Attached you will find relevant portions of my assessment and plan of care.    If you have questions, please do not hesitate to call me. I look forward to following Cruz Dover along with you.    Sincerely,    Yovani Peters MD    Enclosure  CC:  No Recipients    If you would like to receive this communication electronically, please contact [email or web address] or [phone] to request more information on [employer] Link access.    For providers and/or their staff who would like to refer a patient to [facility], please contact us through our one-stop-shop provider referral line, [facility], at [phone].    If you feel you have received this communication in error or would no longer like to receive these types of communications, please e-mail [email or web address]

## 2019-05-29 NOTE — PROGRESS NOTES
[facility]    Consulting Provider: Radha Isabel A*    CHIEF COMPLAINT:   Chief Complaint   Patient presents with    Shoulder Pain     andre shoulder pain     HISTORY OF PRESENT ILLNESS: Cruz Dover is a 68 y.o. male who presents to me referred by Radha Isabel, SHAR, for evaluation of bilateral shoulder pain. Received bilateral subacromial bursa injection on 5/20/19.    Accident on 3/30/19 when cutting trees, fell about 12 feet. Treated at [facility] and has been doing PT. Fractured C5 and C7 vertebrae, had surgery (fusion), was in SICU until April and IPR until 5/10/19. Has not yet had any PT for shoulders, but did get injections with Orthopedics which helped and continues to help. Having difficulty getting outpatient PT for shoulders due to difficulty getting transportation.    States that he did not have shoulder pain until after fall, states that he landed on shoulders. Lost sensation from nipples down, had good motion in his arms. Shoulders didn't really start hurting until a couple of weeks into therapy. Describes pain as achy, sharp, 3/10 at rest, 7/10 with movement above shoulders. Uses voltaren gel but doesn't help, worse after lying on his sides at night (can't lay on his backside due to pressure wound). No previous surgery on trauma to shoulders. Had injections into shoulders approximately 10 years ago.     Of note, he has pressure injury on his back side that he got at [place].    Review of Systems   Constitutional: Negative for fever.   HENT: Negative for drooling.    Eyes: Negative for discharge.   Respiratory: Negative for choking.    Cardiovascular: Negative for chest pain.   Genitourinary: Negative for flank pain.   Skin: Negative for wound.   Allergic/Immunologic: Negative for immunocompromised state.   Neurological: Negative for tremors and syncope.   Psychiatric/Behavioral: Negative for behavioral problems.     Past Medical History:   Past Medical History:    Diagnosis Date    Anemia     Colon polyps 8/14/2012    adenomatous-recheck in 2017    Diverticulosis     Hyperlipidemia LDL goal < 100     Hypertension     Primary insomnia 2/21/2018    He has insomnia.  His problem is more of a problem with not being able to go back to sleep when he awakens in the middle of the night.  He has not had problems on the ambien.       Past Surgical History:   Past Surgical History:   Procedure Laterality Date    cardiac bypass      CARPAL TUNNEL RELEASE      COLONOSCOPY  2012    CORONARY ARTERY BYPASS GRAFT      HERNIA MESH REMOVAL      HERNIA REPAIR      NASAL SEPTUM SURGERY      septoplasty and SMR turbinoplasty     [place] COLONOSCOPY,SHIKHA MONTESINOS/CAUTERY  8/14/2012         VASECTOMY      VASECTOMY         Family History:   Family History   Problem Relation Age of Onset    Diabetes Mother     Cancer Mother     Diabetes Sister     Cancer Paternal Uncle     Colon cancer Neg Hx        Medications:   Current Outpatient Medications on File Prior to Visit   Medication Sig Dispense Refill    acidophilus-sporogenes 35 million- 25 million cell Tab Take 1 tablet by mouth.      aspirin 81 MG chewable tablet once daily.       BYSTOLIC 5 mg Tab Take 5 mg by mouth once daily.  3    clopidogrel (PLAVIX) 75 mg tablet TAKE ONE TABLET BY MOUTH ONCE DAILY 90 tablet 1    dextromethorphan-guaifenesin  mg (MUCINEX DM)  mg per 12 hr tablet Take 1 tablet by mouth.      diclofenac sodium (VOLTAREN) 1 % Gel Apply 2 g topically 4 (four) times daily. 300 g 0    gabapentin (NEURONTIN) 300 MG capsule Take 600 mg by mouth 3 (three) times daily.  3    glucosamine-chondroitin 500-400 mg tablet Take 1 tablet by mouth 2 (two) times daily.      oxybutynin (DITROPAN-XL) 5 MG TR24 Take 5 mg by mouth.      oxyCODONE (ROXICODONE) 10 mg Tab immediate release tablet Take 10 mg by mouth.       No current facility-administered medications on file prior to visit.        Allergies:  "  Review of patient's allergies indicates:   Allergen Reactions    Penicillin g     Penicillins      Other reaction(s): Unknown       Social History:   Social History     Socioeconomic History    Marital status:      Spouse name: Not on file    Number of children: Not on file    Years of education: Not on file    Highest education level: Not on file   Occupational History    Not on file   Social Needs    Financial resource strain: Not on file    Food insecurity:     Worry: Not on file     Inability: Not on file    Transportation needs:     Medical: Not on file     Non-medical: Not on file   Tobacco Use    Smoking status: Former Smoker     Last attempt to quit: 2/10/2008     Years since quittin.3    Smokeless tobacco: Never Used   Substance and Sexual Activity    Alcohol use: Yes     Alcohol/week: 1.8 oz     Types: 1 Glasses of wine, 2 Cans of beer per week     Comment: 2/day    Drug use: No    Sexual activity: Yes     Partners: Female   Lifestyle    Physical activity:     Days per week: Not on file     Minutes per session: Not on file    Stress: Not on file   Relationships    Social connections:     Talks on phone: Not on file     Gets together: Not on file     Attends Roman [employer] service: Not on file     Active member of club or organization: Not on file     Attends meetings of clubs or organizations: Not on file     Relationship status: Not on file   Other Topics Concern    Not on file   Social History Narrative    Not on file     PHYSICAL EXAMINATION:   General    Vitals:    19 1100   BP: 131/83   Pulse: (!) 59   Weight: 79.4 kg (175 lb)   Height: 5' 10" (1.778 m)     Constitutional: Oriented to person, place, and time. No apparent distress. Appears well-developed and well-nourished. Pleasant. Wearing neck brace.  HENT:   Head: Normocephalic and atraumatic.   Eyes: Right eye exhibits no discharge. Left eye exhibits no discharge. No scleral icterus.   Pulmonary/Chest: Effort " normal. No respiratory distress.   Abdominal: There is no guarding.   Neurological: Alert and oriented to person, place, and time.   Psychiatric: Behavior is normal.   Right Shoulder Exam     Tenderness   The patient is experiencing tenderness in the acromioclavicular joint.    Range of Motion   Active abduction: 160   Passive abduction: 160 (Pain with 90 to 120 degrees of motion)   External rotation: 90   Forward flexion: 160   Internal rotation 90 degrees: 20     Muscle Strength   Abduction: 4/5   External rotation: 4/5   Subscapularis: 4/5   Biceps: 4/5     Tests   Willis test: negative  Cross arm: negative  Impingement: negative  Drop arm: negative  Sulcus: absent    Other   Erythema: absent  Scars: absent  Sensation: decreased  Pulse: present      Left Shoulder Exam     Tenderness   The patient is experiencing no tenderness.     Range of Motion   Active abduction:  160 normal   Passive abduction:  160 normal   Extension: normal   External rotation:  90 normal   Forward flexion:  160 normal   Internal rotation 90 degrees: 20     Muscle Strength   Abduction: 4/5   External rotation: 4/5   Subscapularis: 4/5   Biceps: 4/5     Tests   Willis test: negative  Cross arm: negative  Impingement: negative  Drop arm: negative  Sulcus: absent    Other   Erythema: absent  Scars: absent  Sensation: decreased  Pulse: present         INSPECTION: There is no swelling, ecchymoses, erythema or gross deformity of the shoulder.    Imaging  X-ray the right shoulder from 05/20/2019: No acute osseous abnormality.  Mild bilateral glenohumeral and AC joint degenerative changes present.  Lung parenchyma clear.    ASSESSMENT:   1. Chronic pain of both shoulders      PLAN:     1. Time was spent reviewing the above diagnosis in depth with Cruz today, including acute management and rehabilitation.     2.  We discussed that he has fairly adequate strength of both shoulders on physical exam today, therefore I have a lower suspicion for  "significant rotator cuff tearing.  We discussed the importance of building up shoulder strength considering his paraplegia going forward.  He does have also decent range of motion on exam, and x-ray does not show any significant glenohumeral degenerative disease.  Considering he received injections bilaterally of corticosteroid, we will avoid repeat injections.  In the future he could benefit more perhaps from injections in the glenohumeral joint, however we will proceed with physical therapy at this point building up shoulder and scapular stabilization.    3. Given his history and recent immobilization after major neck fracture with likely overuse injury during inpatient rehab, his shoulder pain is likely due to muscle imbalances, disuse atrophy, and stiffness and less likely due to any injury that might require surgery and further intervention. Would recommend outpatient rehab focusing on pain-free range of motion (already ordered).    4. If pain does not improve, we also discussed the possibility of PRP injections in the future as well as corticosteroid injections into the glenohumeral joint.     5. RTC prn. Patient is in agreement with this plan.    This is a consult from Radha Isabel. Please see the "Communications" section of Epic to see how the consulting physician received the report of today's findings and recommendations. If it's an [facility] physician, it will be forwarded to his/her "in basket".    The above note was completed, in part, with the aid of Dragon dictation software/hardware. Translation errors may be present.  "

## 2019-05-31 ENCOUNTER — TELEPHONE (OUTPATIENT)
Dept: FAMILY MEDICINE | Facility: CLINIC | Age: 69
End: 2019-05-31

## 2019-05-31 ENCOUNTER — PATIENT MESSAGE (OUTPATIENT)
Dept: FAMILY MEDICINE | Facility: CLINIC | Age: 69
End: 2019-05-31

## 2019-05-31 ENCOUNTER — PATIENT MESSAGE (OUTPATIENT)
Dept: OTHER | Facility: OTHER | Age: 69
End: 2019-05-31

## 2019-05-31 ENCOUNTER — PATIENT MESSAGE (OUTPATIENT)
Dept: PHYSICAL MEDICINE AND REHAB | Facility: CLINIC | Age: 69
End: 2019-05-31

## 2019-05-31 DIAGNOSIS — N31.9 BLADDER DYSFUNCTION: Primary | ICD-10-CM

## 2019-05-31 NOTE — TELEPHONE ENCOUNTER
Pt is having pain in his shoulders would like to request a muscle relaxer. Let pt know you are out of town until next week. Offered pt an appt with the NP, pt refused. Pt states he will wait till you get back and see what you say. Please Advise.

## 2019-05-31 NOTE — TELEPHONE ENCOUNTER
----- Message from Cody Benjamin sent at 5/31/2019 10:29 AM CDT -----  Type: Needs Medical Advice    Who Called: patient  Symptoms (please be specific):  Shoulder pain  How long has patient had these symptoms:  Since broke neck in march  Pharmacy name and phone #:      [place] - [place] - [address]  [id number]  [place] [address]  Phone: [phone] Fax: [phone]      Best Call Back Number: [phone]  Additional Information: patient is experiencing some pain in his shoulders would like to request a muscle relaxer to help him

## 2019-05-31 NOTE — TELEPHONE ENCOUNTER
----- Message from Cody Benjamin sent at 5/31/2019 10:29 AM CDT -----  Type: Needs Medical Advice    Who Called: patient  Symptoms (please be specific):  Shoulder pain  How long has patient had these symptoms:  Since broke neck in march  Pharmacy name and phone #:      [facility] - [place] - [address]  [id number]  [place] [address]  Phone: [phone] Fax: [phone]      Best Call Back Number: [phone]  Additional Information: patient is experiencing some pain in his shoulders would like to request a muscle relaxer to help him

## 2019-06-02 RX ORDER — CYCLOBENZAPRINE HCL 5 MG
5 TABLET ORAL 3 TIMES DAILY PRN
Qty: 90 TABLET | Refills: 0 | Status: SHIPPED | OUTPATIENT
Start: 2019-06-02 | End: 2019-06-12

## 2019-06-02 NOTE — TELEPHONE ENCOUNTER
I have signed for the following orders AND/OR meds.  Please call the patient and ask the patient to schedule the testing AND/OR inform about any medications that were sent.      No orders of the defined types were placed in this encounter.      Medications Ordered This Encounter   Medications    cyclobenzaprine (FLEXERIL) 5 MG tablet     Sig: Take 1 tablet (5 mg total) by mouth 3 (three) times daily as needed for Muscle spasms.     Dispense:  90 tablet     Refill:  0

## 2019-06-05 ENCOUNTER — PATIENT OUTREACH (OUTPATIENT)
Dept: OTHER | Facility: OTHER | Age: 69
End: 2019-06-05

## 2019-06-05 NOTE — PROGRESS NOTES
"Last 5 Patient Entered Readings                                      Current 30 Day Average: 115/74     Recent Readings 5/28/2019 5/28/2019 5/24/2019 5/24/2019 5/22/2019    SBP (mmHg) 117 147 104 87 124    DBP (mmHg) 77 89 68 61 73    Pulse 81 73 79 81 74          6/5: Spoke to patient following Enrollment and HC Calls.    From chart review: "Accident on 3/30/19 when cutting trees, fell about 12 feet. Treated at [facility] and has been doing PT. Fractured C5 and C7 vertebrae, had surgery (fusion), was in SICU until April and IPR until 5/10/19."    Reviewed patient's medications and verified allergies on file.   Hypertension Medications             BYSTOLIC 5 mg Tab Take 5 mg by mouth once daily.            Patient and I agreed that the patient will take his BP at least once weekly.  Also asked that the BP be taken at least 1 hour after taking BP medications.     Explained that our goal is to get his BP to consistently below 130/80mmHg.     Emailed patient my direct phone number in case he has any questions.     From Profile:   Depression: n/a  Sleep Apnea: Need to ask about this next call.    - Answered "yes" to "Has anyone witnessed you as having very loud snoring or having stopped breathing when you are sleeping?"          "

## 2019-06-12 ENCOUNTER — PATIENT MESSAGE (OUTPATIENT)
Dept: FAMILY MEDICINE | Facility: CLINIC | Age: 69
End: 2019-06-12

## 2019-06-12 ENCOUNTER — TELEPHONE (OUTPATIENT)
Dept: FAMILY MEDICINE | Facility: CLINIC | Age: 69
End: 2019-06-12

## 2019-06-13 NOTE — TELEPHONE ENCOUNTER
----- Message from Chester Dominique sent at 6/12/2019  8:41 AM CDT -----  Contact: Josefa ([facility])   Pt blood pressure bottoms out when he lays down 185/95, and shoots up when pt sits up 77/50. Nurse looking for instructions, pt catheter not kinked or any other issues.           [phone]

## 2019-06-13 NOTE — TELEPHONE ENCOUNTER
I do not know what is going on with this.  Please have him seen first thing tomorrow morning for this in the clinic [facility].  I have scheduled him for 7:20 and have sent him a message to get here asap in the morning to get this checked out.  Please communicate with him ASAP when you get in.

## 2019-06-17 NOTE — PROGRESS NOTES
Last 5 Patient Entered Readings                                      Current 30 Day Average: 118/74     Recent Readings 6/15/2019 6/15/2019 6/14/2019 6/13/2019 6/13/2019    SBP (mmHg) 129 81 100 148 144    DBP (mmHg) 74 55 67 97 95    Pulse 88 87 87 89 88            Digital Medicine: Health  Follow Up    Left voicemail to follow up with Mr. Cruz Dover.  Current BP average 118/74 mmHg is at goal, 130/80 mmHg.

## 2019-06-18 ENCOUNTER — PATIENT MESSAGE (OUTPATIENT)
Dept: ORTHOPEDICS | Facility: CLINIC | Age: 69
End: 2019-06-18

## 2019-06-18 ENCOUNTER — PATIENT MESSAGE (OUTPATIENT)
Dept: FAMILY MEDICINE | Facility: CLINIC | Age: 69
End: 2019-06-18

## 2019-06-18 NOTE — PROGRESS NOTES
DATE: 6/18/2019  PATIENT: Cruz Dover  REFERRING MD:   CHIEF COMPLAINT:   Chief Complaint   Patient presents with    Right Hand - Pain, Swelling       HISTORY:  Cruz Dover is a 68 y.o. male  who presents for initial evaluation of his right hand middle finger pain and swelling.  His pain rating today is 3/10.  He is in a wheelchair after a fall out of his tractor bucket.  He uses his hands and arms to hold his body upright and has been going to physical therapy.  He denies triggering but has point tenderness over the A1 pulley, he denies triggering.  He denies numbness or tingling or loss of sensation.  He denies any injuries to the hand other than what may have occurred during the fall/injury.  He has a history of right carpal tunnel release.     PAST MEDICAL/SURGICAL HISTORY:  Past Medical History:   Diagnosis Date    Anemia     Colon polyps 8/14/2012    adenomatous-recheck in 2017    Diverticulosis     Hyperlipidemia LDL goal < 100     Hypertension     Primary insomnia 2/21/2018    He has insomnia.  His problem is more of a problem with not being able to go back to sleep when he awakens in the middle of the night.  He has not had problems on the ambien.     Past Surgical History:   Procedure Laterality Date    cardiac bypass      CARPAL TUNNEL RELEASE      COLONOSCOPY  2012    CORONARY ARTERY BYPASS GRAFT      HERNIA MESH REMOVAL      HERNIA REPAIR      NASAL SEPTUM SURGERY      septoplasty and SMR turbinoplasty     [place] COLONOSCOPY,SHIKHA MONTESINOS/[place]  8/14/2012         VASECTOMY      VASECTOMY         Current Medications:   Current Outpatient Medications:     aspirin 81 MG chewable tablet, once daily. , Disp: , Rfl:     BYSTOLIC 5 mg Tab, Take 5 mg by mouth once daily., Disp: , Rfl: 3    clopidogrel (PLAVIX) 75 mg tablet, TAKE ONE TABLET BY MOUTH ONCE DAILY, Disp: 90 tablet, Rfl: 1    dextromethorphan-guaifenesin  mg (MUCINEX DM)  mg per 12 hr tablet, Take 1 tablet by  mouth., Disp: , Rfl:     diclofenac sodium (VOLTAREN) 1 % Gel, Apply 2 g topically 4 (four) times daily., Disp: 300 g, Rfl: 0    gabapentin (NEURONTIN) 300 MG capsule, Take 600 mg by mouth 3 (three) times daily., Disp: , Rfl: 3    glucosamine-chondroitin 500-400 mg tablet, Take 1 tablet by mouth 2 (two) times daily., Disp: , Rfl:     hydrOXYzine pamoate (VISTARIL) 25 MG Cap, Take 25 mg by mouth every 8 (eight) hours as needed., Disp: , Rfl:     oxybutynin (DITROPAN-XL) 5 MG TR24, Take 5 mg by mouth., Disp: , Rfl:     oxyCODONE (ROXICODONE) 10 mg Tab immediate release tablet, Take 10 mg by mouth., Disp: , Rfl:     zolpidem (AMBIEN) 5 MG Tab, Take 5 mg by mouth nightly as needed., Disp: , Rfl:     Family History: family history was reviewed and is noncontributory  Social History:   Social History     Socioeconomic History    Marital status:      Spouse name: Not on file    Number of children: Not on file    Years of education: Not on file    Highest education level: Not on file   Occupational History    Not on file   Social Needs    Financial resource strain: Not on file    Food insecurity:     Worry: Not on file     Inability: Not on file    Transportation needs:     Medical: Not on file     Non-medical: Not on file   Tobacco Use    Smoking status: Former Smoker     Last attempt to quit: 2/10/2008     Years since quittin.3    Smokeless tobacco: Never Used   Substance and Sexual Activity    Alcohol use: Yes     Alcohol/week: 1.8 oz     Types: 1 Glasses of wine, 2 Cans of beer per week     Comment: 2/day    Drug use: No    Sexual activity: Yes     Partners: Female   Lifestyle    Physical activity:     Days per week: Not on file     Minutes per session: Not on file    Stress: Not on file   Relationships    Social connections:     Talks on phone: Not on file     Gets together: Not on file     Attends [employer] service: Not on file     Active member of club or organization: Not on file      Attends meetings of clubs or organizations: Not on file     Relationship status: Not on file   Other Topics Concern    Not on file   Social History Narrative    Not on file       ROS:  Constitution: Negative for chills, fever, and sweats. Negative for unexplained weight loss.  Eyes: no redness, no discharge  Ears: no ear pain or tinnitus  Cardiovascular: Negative for chest pain, irregular heartbeat, leg swelling and palpitations.   Respiratory: Negative for cough and shortness of breath.   Gastrointestinal: Negative for abdominal pain, nausea and vomiting.   Genitourinary: Negative for bladder incontinence and dysuria.   Neurological: Negative for numbness.   Psychiatric/Behavioral: Negative for behavior changes.   Endocrine: Negative for palpitations.   Hematologic/Lymphatic: Negative for bleeding disorders.  Skin: Negative for pruritis or rash.     PHYSICAL EXAM:  Cruz Dover is a well developed, well nourished male in no acute distress. Physical examination of the right wrist and hand evaluated the following:    Inspection, palpation and ROM of the wrist and hand  Inspection for swelling, ecchymosis, erythema, deformity and atrophy  Tenderness to palpation of the soft tissue and bony structures  Active and passive range of motion  Sensation of the wrist and hand and upper extremity  Motor strength  Special tests for trigger finger, Finkelstein, phalens, tinels, compression test  Upper extremity vascular exam (skin temp,color, capillary refill)  Inspection for pseudomotor signs    Remarkable findings included:  Obvious Edema about the middle finger and slight about the index and ring fingers, no ecchymosis or obvious deformity  Moderately tender to palpation about the A1 pulley of the ring finger  ROM without full flexion, no triggering elicited   Strength 4/5  Negative Tinel's and Durkans  Sensation intact  Skin warm, dry, intact      IMAGING:   X-ray obtained Right hand performed today personally  reviewed with patient. Radiologist report as follows:   No acute fracture or dislocation.  Mild degenerative changes seen scattered throughout the interphalangeal joints, MCP joints, 1st CMC and triscaphe joints.    ASSESSMENT:   Right middle finger trigger finger    PLAN:  The nature of the diagnosis, using models and diagrams when appropriate, was explained to the patient and his wife in detail. Treatment option discussed included non-operative measures of rest,  modification of activities, application of ice, elevation of extremity, over the counter pain/antiinflammatory relief, physica/occupational therapy and cortisone injection.  More aggressive treatment options include referal to hand specialist.  All questions answered and the patient wishes to proceed today with cortisone injection.  Right trigger finger cortisone injection performed today (see procedure documentation).  I have instructed to monitor injection site for signs and symptoms of inflammation/infection.  I have instructed to elevate and apply ice to hand this evening.  I have place a referral for consultation to [facility] per patient's request. Follow up if no improvement or worsening of symptoms.         Answers for HPI/ROS submitted by the patient on 6/18/2019   Hand injury  unexpected weight change: No  appetite change : No  sleep disturbance: No  IMMUNOCOMPROMISED: No  nervous/ anxious: Yes  dysphoric mood: No  rash: No  visual disturbance: No  eye redness: No  eye pain: No  ear pain: No  tinnitus: Yes  hearing loss: No  sinus pressure : No  nosebleeds: No  enviro allergies: No  food allergies: No  cough: No  shortness of breath: Yes  sweating: No  frequency: No  difficulty urinating: No  hematuria: No  chest pain: No  palpitations: No  nausea: No  vomiting: No  diarrhea: No  blood in stool: No  constipation: No  headaches: No  dizziness: Yes  numbness: Yes  seizures: No  joint swelling: Yes  myalgia: Yes  weakness: Yes  back  pain: No  Pain Chronicity: chronic  History of trauma: Yes  Onset: more than 1 month ago  Frequency: constantly  Progression since onset: unchanged  Injury mechanism: falling  injury location: at home  pain- numeric: 8/10  pain location: right hand  pain quality: tightness  Radiating Pain: No  Aggravating factors: activity  fever: No  inability to bear weight: Yes  itching: No  joint locking: No  limited range of motion: Yes  stiffness: Yes  tingling: Yes  Treatments tried: heat, exercise, movement  physical therapy: not tried  Improvement on treatment: no relief

## 2019-06-19 ENCOUNTER — HOSPITAL ENCOUNTER (OUTPATIENT)
Dept: RADIOLOGY | Facility: HOSPITAL | Age: 69
Discharge: HOME | End: 2019-06-19
Attending: NURSE PRACTITIONER
Payer: MEDICARE

## 2019-06-19 ENCOUNTER — PATIENT OUTREACH (OUTPATIENT)
Dept: OTHER | Facility: OTHER | Age: 69
End: 2019-06-19

## 2019-06-19 ENCOUNTER — OFFICE VISIT (OUTPATIENT)
Dept: FAMILY MEDICINE | Facility: CLINIC | Age: 69
End: 2019-06-19
Payer: MEDICARE

## 2019-06-19 ENCOUNTER — OFFICE VISIT (OUTPATIENT)
Dept: ORTHOPEDICS | Facility: CLINIC | Age: 69
End: 2019-06-19
Payer: MEDICARE

## 2019-06-19 VITALS
WEIGHT: 175 LBS | BODY MASS INDEX: 25.05 KG/M2 | HEART RATE: 82 BPM | HEIGHT: 70 IN | DIASTOLIC BLOOD PRESSURE: 64 MMHG | SYSTOLIC BLOOD PRESSURE: 108 MMHG | TEMPERATURE: 98 F

## 2019-06-19 DIAGNOSIS — I99.8 BLOOD PRESSURE INSTABILITY: ICD-10-CM

## 2019-06-19 DIAGNOSIS — M65.331 TRIGGER FINGER, RIGHT MIDDLE FINGER: ICD-10-CM

## 2019-06-19 DIAGNOSIS — I49.9 IRREGULAR HEART RATE: Primary | ICD-10-CM

## 2019-06-19 DIAGNOSIS — R68.89 OTHER GENERAL SYMPTOMS AND SIGNS: ICD-10-CM

## 2019-06-19 DIAGNOSIS — M79.641 PAIN OF RIGHT HAND: Primary | ICD-10-CM

## 2019-06-19 DIAGNOSIS — M25.512 BILATERAL SHOULDER PAIN, UNSPECIFIED CHRONICITY: ICD-10-CM

## 2019-06-19 DIAGNOSIS — R42 DIZZINESS: ICD-10-CM

## 2019-06-19 DIAGNOSIS — M79.641 PAIN OF RIGHT HAND: ICD-10-CM

## 2019-06-19 DIAGNOSIS — M25.511 BILATERAL SHOULDER PAIN, UNSPECIFIED CHRONICITY: ICD-10-CM

## 2019-06-19 PROCEDURE — 73130 X-RAY EXAM OF HAND: CPT | Mod: 26,HCNC,RT, | Performed by: RADIOLOGY

## 2019-06-19 PROCEDURE — 99213 OFFICE O/P EST LOW 20 MIN: CPT | Mod: HCNC,S$GLB,, | Performed by: NURSE PRACTITIONER

## 2019-06-19 PROCEDURE — 3074F PR MOST RECENT SYSTOLIC BLOOD PRESSURE < 130 MM HG: ICD-10-PCS | Mod: HCNC,CPTII,S$GLB, | Performed by: NURSE PRACTITIONER

## 2019-06-19 PROCEDURE — 99999 PR PBB SHADOW E&M-EST. PATIENT-LVL V: CPT | Mod: PBBFAC,HCNC,, | Performed by: NURSE PRACTITIONER

## 2019-06-19 PROCEDURE — 1101F PR PT FALLS ASSESS DOC 0-1 FALLS W/OUT INJ PAST YR: ICD-10-PCS | Mod: HCNC,CPTII,S$GLB, | Performed by: NURSE PRACTITIONER

## 2019-06-19 PROCEDURE — 99999 PR PBB SHADOW E&M-EST. PATIENT-LVL V: ICD-10-PCS | Mod: PBBFAC,HCNC,, | Performed by: NURSE PRACTITIONER

## 2019-06-19 PROCEDURE — 73130 X-RAY EXAM OF HAND: CPT | Mod: TC,HCNC,PO,RT

## 2019-06-19 PROCEDURE — 20600 DRAIN/INJ JOINT/BURSA W/O US: CPT | Mod: F7,HCNC,S$GLB, | Performed by: NURSE PRACTITIONER

## 2019-06-19 PROCEDURE — 99213 PR OFFICE/OUTPT VISIT, EST, LEVL III, 20-29 MIN: ICD-10-PCS | Mod: HCNC,S$GLB,, | Performed by: NURSE PRACTITIONER

## 2019-06-19 PROCEDURE — 1101F PT FALLS ASSESS-DOCD LE1/YR: CPT | Mod: HCNC,CPTII,S$GLB, | Performed by: NURSE PRACTITIONER

## 2019-06-19 PROCEDURE — 93010 ELECTROCARDIOGRAM REPORT: CPT | Mod: HCNC,S$GLB,, | Performed by: NUCLEAR MEDICINE

## 2019-06-19 PROCEDURE — 73130 XR HAND COMPLETE 3 VIEW RIGHT: ICD-10-PCS | Mod: 26,HCNC,RT, | Performed by: RADIOLOGY

## 2019-06-19 PROCEDURE — 99214 OFFICE O/P EST MOD 30 MIN: CPT | Mod: 25,HCNC,S$GLB, | Performed by: NURSE PRACTITIONER

## 2019-06-19 PROCEDURE — 3074F SYST BP LT 130 MM HG: CPT | Mod: HCNC,CPTII,S$GLB, | Performed by: NURSE PRACTITIONER

## 2019-06-19 PROCEDURE — 3078F PR MOST RECENT DIASTOLIC BLOOD PRESSURE < 80 MM HG: ICD-10-PCS | Mod: HCNC,CPTII,S$GLB, | Performed by: NURSE PRACTITIONER

## 2019-06-19 PROCEDURE — 93005 EKG 12-LEAD: ICD-10-PCS | Mod: HCNC,S$GLB,, | Performed by: NURSE PRACTITIONER

## 2019-06-19 PROCEDURE — 93010 EKG 12-LEAD: ICD-10-PCS | Mod: HCNC,S$GLB,, | Performed by: NUCLEAR MEDICINE

## 2019-06-19 PROCEDURE — 3078F DIAST BP <80 MM HG: CPT | Mod: HCNC,CPTII,S$GLB, | Performed by: NURSE PRACTITIONER

## 2019-06-19 PROCEDURE — 99999 PR PBB SHADOW E&M-EST. PATIENT-LVL III: ICD-10-PCS | Mod: PBBFAC,HCNC,, | Performed by: NURSE PRACTITIONER

## 2019-06-19 PROCEDURE — 99214 PR OFFICE/OUTPT VISIT, EST, LEVL IV, 30-39 MIN: ICD-10-PCS | Mod: 25,HCNC,S$GLB, | Performed by: NURSE PRACTITIONER

## 2019-06-19 PROCEDURE — 20600 SMALL JOINT ASPIRATION/INJECTION: ICD-10-PCS | Mod: F7,HCNC,S$GLB, | Performed by: NURSE PRACTITIONER

## 2019-06-19 PROCEDURE — 99999 PR PBB SHADOW E&M-EST. PATIENT-LVL III: CPT | Mod: PBBFAC,HCNC,, | Performed by: NURSE PRACTITIONER

## 2019-06-19 PROCEDURE — 93005 ELECTROCARDIOGRAM TRACING: CPT | Mod: HCNC,S$GLB,, | Performed by: NURSE PRACTITIONER

## 2019-06-19 RX ORDER — TRIAMCINOLONE ACETONIDE 40 MG/ML
40 INJECTION, SUSPENSION INTRA-ARTICULAR; INTRAMUSCULAR
Status: DISCONTINUED | OUTPATIENT
Start: 2019-06-19 | End: 2019-06-19 | Stop reason: HOSPADM

## 2019-06-19 RX ADMIN — TRIAMCINOLONE ACETONIDE 40 MG: 40 INJECTION, SUSPENSION INTRA-ARTICULAR; INTRAMUSCULAR at 02:06

## 2019-06-19 NOTE — PROCEDURES
Small Joint Aspiration/Injection  Date/Time: 6/19/2019 2:56 PM  Performed by: GRADY Fernández  Authorized by: GRADY Fernández     Consent Done?:  Yes (Verbal)  Indications:  Pain and joint swelling  Timeout: Prior to procedure the correct patient, procedure, and site was verified      Location:  Long finger  Long finger joint: A1 pulley.  Prep: Patient was prepped and draped in usual sterile fashion    Ultrasonic Guidance for needle placement: No  Needle size:  25 G  Approach:  Volar  Medications:  40 mg triamcinolone acetonide 40 mg/mL (20mg)  Patient tolerance:  Patient tolerated the procedure well with no immediate complications

## 2019-06-19 NOTE — PROGRESS NOTES
"  Last 5 Patient Entered Readings                                      Current 30 Day Average: 114/70     Recent Readings 6/23/2019 6/23/2019 6/23/2019 6/22/2019 6/22/2019    SBP (mmHg) 133 140 75 86 90    DBP (mmHg) 78 89 45 54 62    Pulse 86 75 92 90 88          6/19: Called and LM for patient re: LOW BP alert.  Called his Emergency Contact Catarina and got no answer.  Pt and PCP correspondence since 6/12 regarding BP "bottoming out" when pt changes position.      6/26: Spoke to patient. Still having low readings.  He saw his Cardiologist, Dr. Anatoliy Oviedo ([place]), on 6/24 and was given medication to INCREASE his blood pressure.  Pt is unsure of the name of this medication but has been taking it as prescribed.  Pt states that he will send the name of the drug via [employer] Messages in a few days.      Confirmed that he is NOT taking Bystolic so removed it from his medication list in Epic.        "

## 2019-06-19 NOTE — PROGRESS NOTES
Subjective:       Patient ID: Cruz Dover is a 68 y.o. male.    Chief Complaint: Hypertension (fluctuating BP)    Dizziness:   Chronicity:  New (Pt states BP has been dropping)  Onset:  1 to 4 weeks ago  Progression since onset:  Gradually worsening and waxing and waning  Frequency:  Every few days  Severity:  Moderate (States occurs most of the time with position change; sitting from lying)  Duration:  Off/on all day  Dizziness characteristics:  Lightheaded/impending faint   Associated symptoms: light-headedness.no hearing loss, no ear congestion, no ear pain, no fever, no headaches, no tinnitus, no nausea, no vomiting, no diaphoresis, no aural fullness, no weakness, no visual disturbances, no syncope, no palpitations, no panic, no facial weakness, no slurred speech, no numbness in extremities and no chest pain.  Aggravated by:  Position changes  Treatments tried:  Body position changes  Improvements on treatment:  No reliefno strokes, no cardiac surgery, no neurologic disease, no head trauma, no balance testing, no ear trauma, no ear surgery, no head trauma, no ear infections, no anxiety, no ear tubes, no environmental allergies, no MRI head and no CT head.   C7 compression fracture     Past Medical History:   Diagnosis Date    Anemia     Colon polyps 8/14/2012    adenomatous-recheck in 2017    Diverticulosis     Hyperlipidemia LDL goal < 100     Hypertension     Primary insomnia 2/21/2018    He has insomnia.  His problem is more of a problem with not being able to go back to sleep when he awakens in the middle of the night.  He has not had problems on the ambien.     Social History     Socioeconomic History    Marital status:      Spouse name: Not on file    Number of children: Not on file    Years of education: Not on file    Highest education level: Not on file   Occupational History    Not on file   Social Needs    Financial resource strain: Not on file    Food insecurity:     Worry:  Not on file     Inability: Not on file    Transportation needs:     Medical: Not on file     Non-medical: Not on file   Tobacco Use    Smoking status: Former Smoker     Last attempt to quit: 2/10/2008     Years since quittin.3    Smokeless tobacco: Never Used   Substance and Sexual Activity    Alcohol use: Yes     Alcohol/week: 1.8 oz     Types: 1 Glasses of wine, 2 Cans of beer per week     Comment: 2/day    Drug use: No    Sexual activity: Yes     Partners: Female   Lifestyle    Physical activity:     Days per week: Not on file     Minutes per session: Not on file    Stress: Not on file   Relationships    Social connections:     Talks on phone: Not on file     Gets together: Not on file     Attends [employer] service: Not on file     Active member of club or organization: Not on file     Attends meetings of clubs or organizations: Not on file     Relationship status: Not on file   Other Topics Concern    Not on file   Social History Narrative    Not on file     Past Surgical History:   Procedure Laterality Date    cardiac bypass      CARPAL TUNNEL RELEASE      COLONOSCOPY      CORONARY ARTERY BYPASS GRAFT      HERNIA MESH REMOVAL      HERNIA REPAIR      NASAL SEPTUM SURGERY      septoplasty and SMR turbinoplasty     [place] COLONOSCOPY,REMV LESN,FORCEP/CAUTERY  2012         VASECTOMY      VASECTOMY         Review of Systems   Constitutional: Negative.  Negative for diaphoresis, fever and unexpected weight change.   HENT: Negative.  Negative for ear pain, hearing loss, rhinorrhea, tinnitus and trouble swallowing.    Eyes: Negative.  Negative for discharge and visual disturbance.   Respiratory: Negative.  Negative for wheezing.    Cardiovascular: Negative.  Negative for chest pain, palpitations and syncope.        Fluctuating BP   Gastrointestinal: Negative.  Negative for blood in stool, constipation, diarrhea, nausea and vomiting.   Endocrine: Negative.  Negative for polydipsia and  polyuria.   Genitourinary: Negative.  Negative for difficulty urinating, hematuria and urgency.   Musculoskeletal: Negative.    Skin: Negative.    Allergic/Immunologic: Negative.  Negative for environmental allergies.   Neurological: Positive for dizziness and light-headedness. Negative for weakness and headaches.   Psychiatric/Behavioral: Negative.  Negative for confusion and dysphoric mood.       Objective:      Physical Exam   Constitutional: He is oriented to person, place, and time. He appears well-developed and well-nourished.   HENT:   Head: Normocephalic.   Right Ear: External ear normal.   Left Ear: External ear normal.   Nose: Nose normal.   Mouth/Throat: Oropharynx is clear and moist.   Eyes: Pupils are equal, round, and reactive to light. Conjunctivae are normal.   Neck: Neck supple.   Cardiovascular: Normal heart sounds. An irregularly irregular rhythm present.   Pulmonary/Chest: Effort normal and breath sounds normal.   Abdominal: Soft. Bowel sounds are normal.   Neurological: He is alert and oriented to person, place, and time.   Skin: Skin is warm and dry. Capillary refill takes 2 to 3 seconds.   Psychiatric: He has a normal mood and affect. His behavior is normal. Judgment and thought content normal.   Nursing note and vitals reviewed.      Assessment:       1. Irregular heart rate    2. Blood pressure instability    3. Dizziness    4. Other general symptoms and signs         Plan:           Cruz was seen today for hypertension.    Diagnoses and all orders for this visit:    Irregular heart rate  Blood pressure instability  Dizziness  Other general symptoms and signs  -     Ambulatory referral to Cardiology  -     IN OFFICE EKG 12-LEAD (to Muse)  -     CBC auto differential; Future  -     TSH; Future  -     Comprehensive metabolic panel; Future  Report to ER immediately if symptoms worsen

## 2019-07-01 NOTE — PROGRESS NOTES
Last 5 Patient Entered Readings                                      Current 30 Day Average: 113/69     Recent Readings 6/26/2019 6/23/2019 6/23/2019 6/23/2019 6/22/2019    SBP (mmHg) 110 133 140 75 86    DBP (mmHg) 67 78 89 45 54    Pulse 76 86 75 92 90          Left VM. Second failed attempt. Sending [employer] message.

## 2019-07-03 ENCOUNTER — PATIENT MESSAGE (OUTPATIENT)
Dept: FAMILY MEDICINE | Facility: CLINIC | Age: 69
End: 2019-07-03

## 2019-07-03 RX ORDER — ZOLPIDEM TARTRATE 5 MG/1
5 TABLET ORAL NIGHTLY PRN
Qty: 30 TABLET | Refills: 4 | Status: SHIPPED | OUTPATIENT
Start: 2019-07-03 | End: 2020-05-07 | Stop reason: SDUPTHER

## 2019-07-03 RX ORDER — MIDODRINE HYDROCHLORIDE 5 MG/1
5 TABLET ORAL
COMMUNITY
End: 2019-08-27

## 2019-07-08 ENCOUNTER — PATIENT MESSAGE (OUTPATIENT)
Dept: FAMILY MEDICINE | Facility: CLINIC | Age: 69
End: 2019-07-08

## 2019-07-08 RX ORDER — ZOLPIDEM TARTRATE 5 MG/1
5 TABLET ORAL NIGHTLY PRN
Qty: 30 TABLET | Refills: 4 | OUTPATIENT
Start: 2019-07-08

## 2019-07-09 ENCOUNTER — PATIENT MESSAGE (OUTPATIENT)
Dept: FAMILY MEDICINE | Facility: CLINIC | Age: 69
End: 2019-07-09

## 2019-07-10 ENCOUNTER — PATIENT OUTREACH (OUTPATIENT)
Dept: OTHER | Facility: OTHER | Age: 69
End: 2019-07-10

## 2019-07-10 NOTE — PROGRESS NOTES
Last 5 Patient Entered Readings                                      Current 30 Day Average: 111/68     Recent Readings 7/10/2019 7/10/2019 7/10/2019 7/10/2019 7/7/2019    SBP (mmHg) 117 89 79 115 127    DBP (mmHg) 63 59 53 72 70    Pulse 78 93 88 70 66          7/10: Called pt for LOW BP alert.  He states he is feeling OK and working with his Cardiologist Dr. Anatoliy Oviedo (not at [facility]) to adjust dosing on Midodrine to minimize lows.

## 2019-07-12 ENCOUNTER — HOSPITAL ENCOUNTER (OUTPATIENT)
Dept: RADIOLOGY | Facility: HOSPITAL | Age: 69
Discharge: HOME | End: 2019-07-12
Payer: MEDICARE

## 2019-07-12 DIAGNOSIS — M54.2 CERVICALGIA: ICD-10-CM

## 2019-07-12 PROCEDURE — 72040 XR CERVICAL SPINE AP LATERAL: ICD-10-PCS | Mod: 26,HCNC,, | Performed by: RADIOLOGY

## 2019-07-12 PROCEDURE — 72040 X-RAY EXAM NECK SPINE 2-3 VW: CPT | Mod: TC,HCNC,PO

## 2019-07-12 PROCEDURE — 72040 X-RAY EXAM NECK SPINE 2-3 VW: CPT | Mod: 26,HCNC,, | Performed by: RADIOLOGY

## 2019-07-17 ENCOUNTER — PATIENT MESSAGE (OUTPATIENT)
Dept: FAMILY MEDICINE | Facility: CLINIC | Age: 69
End: 2019-07-17

## 2019-07-17 DIAGNOSIS — R23.9 UNSPECIFIED SKIN CHANGES: Primary | ICD-10-CM

## 2019-07-17 NOTE — PROGRESS NOTES
"Last 5 Patient Entered Readings                                      Current 30 Day Average: 114/67     Recent Readings 7/16/2019 7/16/2019 7/16/2019 7/15/2019 7/15/2019    SBP (mmHg) 110 131 74 123 101    DBP (mmHg) 62 88 50 77 65    Pulse 65 73 74 87 97          Digital Medicine: Health  Follow Up    Patient states he is experiencing some hypotensive episodes but is working with a cardiologist about solutions. He states he knows how to treat an episode.     Patient is going to PT 3 times a week for "everything; from arms, legs, back, and fingers." States he has been going for about a month and states he is liking it and is making him stronger.    Follow up with . Cruz Dover completed. No further questions or concerns. Will continue to follow up to achieve health goals.    "

## 2019-07-24 ENCOUNTER — TELEPHONE (OUTPATIENT)
Dept: FAMILY MEDICINE | Facility: CLINIC | Age: 69
End: 2019-07-24

## 2019-07-24 NOTE — TELEPHONE ENCOUNTER
----- Message from Neena Jacinto sent at 7/24/2019  2:20 PM CDT -----  [facility] is calling in regards to getting a copy of pt referral for wound care fax to [phone].     [phone]

## 2019-08-21 ENCOUNTER — PATIENT MESSAGE (OUTPATIENT)
Dept: FAMILY MEDICINE | Facility: CLINIC | Age: 69
End: 2019-08-21

## 2019-08-21 NOTE — TELEPHONE ENCOUNTER
I am sorry that this is a narcotic and now requires a face-to-face visit every 3 months.  Please book with a provider tomorrow.

## 2019-08-22 ENCOUNTER — PATIENT MESSAGE (OUTPATIENT)
Dept: FAMILY MEDICINE | Facility: CLINIC | Age: 69
End: 2019-08-22

## 2019-08-22 RX ORDER — PREGABALIN 100 MG/1
100 CAPSULE ORAL 3 TIMES DAILY
Refills: 0 | COMMUNITY
Start: 2019-07-10 | End: 2019-08-27 | Stop reason: SDUPTHER

## 2019-08-27 ENCOUNTER — TELEPHONE (OUTPATIENT)
Dept: FAMILY MEDICINE | Facility: CLINIC | Age: 69
End: 2019-08-27

## 2019-08-27 ENCOUNTER — OFFICE VISIT (OUTPATIENT)
Dept: FAMILY MEDICINE | Facility: CLINIC | Age: 69
End: 2019-08-27
Payer: MEDICARE

## 2019-08-27 VITALS
BODY MASS INDEX: 25.11 KG/M2 | TEMPERATURE: 99 F | SYSTOLIC BLOOD PRESSURE: 110 MMHG | HEART RATE: 74 BPM | DIASTOLIC BLOOD PRESSURE: 66 MMHG | HEIGHT: 70 IN

## 2019-08-27 DIAGNOSIS — G89.21 CHRONIC PAIN DUE TO TRAUMA: ICD-10-CM

## 2019-08-27 DIAGNOSIS — E78.5 HYPERLIPIDEMIA, UNSPECIFIED HYPERLIPIDEMIA TYPE: ICD-10-CM

## 2019-08-27 DIAGNOSIS — S14.109D INJURY OF CERVICAL SPINAL CORD, SUBSEQUENT ENCOUNTER: Primary | ICD-10-CM

## 2019-08-27 PROCEDURE — 3078F DIAST BP <80 MM HG: CPT | Mod: HCNC,CPTII,S$GLB, | Performed by: NURSE PRACTITIONER

## 2019-08-27 PROCEDURE — 99999 PR PBB SHADOW E&M-EST. PATIENT-LVL III: ICD-10-PCS | Mod: PBBFAC,HCNC,, | Performed by: NURSE PRACTITIONER

## 2019-08-27 PROCEDURE — 1101F PR PT FALLS ASSESS DOC 0-1 FALLS W/OUT INJ PAST YR: ICD-10-PCS | Mod: HCNC,CPTII,S$GLB, | Performed by: NURSE PRACTITIONER

## 2019-08-27 PROCEDURE — 3074F PR MOST RECENT SYSTOLIC BLOOD PRESSURE < 130 MM HG: ICD-10-PCS | Mod: HCNC,CPTII,S$GLB, | Performed by: NURSE PRACTITIONER

## 2019-08-27 PROCEDURE — 3078F PR MOST RECENT DIASTOLIC BLOOD PRESSURE < 80 MM HG: ICD-10-PCS | Mod: HCNC,CPTII,S$GLB, | Performed by: NURSE PRACTITIONER

## 2019-08-27 PROCEDURE — 3074F SYST BP LT 130 MM HG: CPT | Mod: HCNC,CPTII,S$GLB, | Performed by: NURSE PRACTITIONER

## 2019-08-27 PROCEDURE — 1101F PT FALLS ASSESS-DOCD LE1/YR: CPT | Mod: HCNC,CPTII,S$GLB, | Performed by: NURSE PRACTITIONER

## 2019-08-27 PROCEDURE — 99214 PR OFFICE/OUTPT VISIT, EST, LEVL IV, 30-39 MIN: ICD-10-PCS | Mod: HCNC,S$GLB,, | Performed by: NURSE PRACTITIONER

## 2019-08-27 PROCEDURE — 99999 PR PBB SHADOW E&M-EST. PATIENT-LVL III: CPT | Mod: PBBFAC,HCNC,, | Performed by: NURSE PRACTITIONER

## 2019-08-27 PROCEDURE — 99214 OFFICE O/P EST MOD 30 MIN: CPT | Mod: HCNC,S$GLB,, | Performed by: NURSE PRACTITIONER

## 2019-08-27 RX ORDER — PREGABALIN 100 MG/1
100 CAPSULE ORAL 3 TIMES DAILY
Qty: 90 CAPSULE | Refills: 5 | Status: SHIPPED | OUTPATIENT
Start: 2019-08-27 | End: 2020-02-21

## 2019-08-27 RX ORDER — CLOPIDOGREL BISULFATE 75 MG/1
75 TABLET ORAL DAILY
Qty: 90 TABLET | Refills: 3 | Status: SHIPPED | OUTPATIENT
Start: 2019-08-27 | End: 2019-09-04 | Stop reason: SDUPTHER

## 2019-08-27 RX ORDER — MIDODRINE HYDROCHLORIDE 5 MG/1
5 TABLET ORAL
COMMUNITY
End: 2019-08-27 | Stop reason: SDUPTHER

## 2019-08-27 RX ORDER — TRAMADOL HYDROCHLORIDE 50 MG/1
50 TABLET ORAL EVERY 6 HOURS PRN
COMMUNITY
Start: 2019-03-13 | End: 2019-08-27 | Stop reason: SDUPTHER

## 2019-08-27 RX ORDER — OXYCODONE HYDROCHLORIDE 10 MG/1
10 TABLET ORAL 2 TIMES DAILY PRN
Qty: 40 TABLET | Refills: 0 | Status: SHIPPED | OUTPATIENT
Start: 2019-08-27 | End: 2019-08-28 | Stop reason: SDUPTHER

## 2019-08-27 RX ORDER — TRAMADOL HYDROCHLORIDE 50 MG/1
50 TABLET ORAL EVERY 6 HOURS PRN
Qty: 40 TABLET | Refills: 2 | Status: SHIPPED | OUTPATIENT
Start: 2019-08-27 | End: 2021-03-22

## 2019-08-27 RX ORDER — ROSUVASTATIN CALCIUM 40 MG/1
40 TABLET, COATED ORAL DAILY
Qty: 90 TABLET | Refills: 3 | Status: SHIPPED | OUTPATIENT
Start: 2019-08-27 | End: 2021-06-21 | Stop reason: SDUPTHER

## 2019-08-27 RX ORDER — ROSUVASTATIN CALCIUM 40 MG/1
40 TABLET, COATED ORAL DAILY
Refills: 3 | COMMUNITY
Start: 2019-07-25 | End: 2019-08-27 | Stop reason: SDUPTHER

## 2019-08-27 RX ORDER — MIDODRINE HYDROCHLORIDE 5 MG/1
5 TABLET ORAL
Qty: 90 TABLET | Refills: 5 | Status: SHIPPED | OUTPATIENT
Start: 2019-08-27

## 2019-08-27 NOTE — TELEPHONE ENCOUNTER
----- Message from Solange Rojas sent at 8/27/2019  3:18 PM CDT -----  Contact: [facility](Vijaya)-[phone]  Type:  Pharmacy Calling to Clarify an RX    Name of Caller:Vijaya  Pharmacy Name:[facility]  Prescription Name:Oxycodone  What do they need to clarify?:Need more than medically necessary  Best Call Back Number:[phone]  Additional Information:

## 2019-08-27 NOTE — PROGRESS NOTES
Subjective:       Patient ID: Cruz Dover is a 69 y.o. male.    Chief Complaint: Medication Refill    Medication Refill   This is a chronic (Oxycodone, lyrica, ultram) problem. The current episode started more than 1 month ago. The problem occurs daily. The problem has been unchanged. Associated symptoms include arthralgias, myalgias and neck pain. Pertinent negatives include no abdominal pain, chest pain, coughing, fatigue, fever, headaches, nausea, rash, sore throat or vomiting.   Hyperlipidemia   This is a chronic problem. The current episode started more than 1 year ago. The problem is controlled. Associated symptoms include myalgias. Pertinent negatives include no chest pain or shortness of breath. Current antihyperlipidemic treatment includes statins.       Review of Systems   Constitutional: Negative for fatigue, fever and unexpected weight change.   HENT: Negative for ear pain and sore throat.    Eyes: Negative.  Negative for pain and visual disturbance.   Respiratory: Negative for cough and shortness of breath.    Cardiovascular: Negative for chest pain and palpitations.   Gastrointestinal: Negative for abdominal pain, diarrhea, nausea and vomiting.   Genitourinary: Negative for dysuria and frequency.   Musculoskeletal: Positive for arthralgias, myalgias and neck pain.   Skin: Negative for color change and rash.   Neurological: Negative for dizziness and headaches.   Psychiatric/Behavioral: Negative for sleep disturbance. The patient is not nervous/anxious.        Vitals:    08/27/19 1411   BP: 110/66   Pulse: 74   Temp: 98.5 °F (36.9 °C)       Objective:     Current Outpatient Medications   Medication Sig Dispense Refill    aspirin 81 MG chewable tablet once daily.       clopidogrel (PLAVIX) 75 mg tablet Take 1 tablet (75 mg total) by mouth once daily. 90 tablet 3    diclofenac sodium (VOLTAREN) 1 % Gel Apply 2 g topically 4 (four) times daily. 300 g 0    hydrOXYzine pamoate (VISTARIL) 25 MG Cap  Take 25 mg by mouth every 8 (eight) hours as needed.      LYRICA 100 mg capsule Take 1 capsule (100 mg total) by mouth 3 (three) times daily. 90 capsule 5    midodrine (PROAMATINE) 5 MG Tab Take 1 tablet (5 mg total) by mouth 3 (three) times daily with meals. 90 tablet 5    oxyCODONE (ROXICODONE) 10 mg Tab immediate release tablet Take 1 tablet (10 mg total) by mouth 2 (two) times daily as needed for Pain. > than 7 days Medically necessary 40 tablet 0    rosuvastatin (CRESTOR) 40 MG Tab Take 1 tablet (40 mg total) by mouth once daily. 90 tablet 3    traMADol (ULTRAM) 50 mg tablet Take 1 tablet (50 mg total) by mouth every 6 (six) hours as needed. 40 tablet 2    zolpidem (AMBIEN) 5 MG Tab Take 1 tablet (5 mg total) by mouth nightly as needed. 30 tablet 4     No current facility-administered medications for this visit.        Physical Exam   Constitutional: He is oriented to person, place, and time. He appears well-developed. No distress.   HENT:   Head: Normocephalic and atraumatic.   Eyes: Pupils are equal, round, and reactive to light. EOM are normal.   Neck: Normal range of motion. Neck supple.   Cardiovascular: Normal rate and regular rhythm.   Pulmonary/Chest: Effort normal and breath sounds normal.   Musculoskeletal: Normal range of motion.   Pt uses a power wheelchair for ambulation. Right arm weakness   Neurological: He is alert and oriented to person, place, and time.   Skin: Skin is warm and dry. No rash noted.   Psychiatric: He has a normal mood and affect. Thought content normal.   Nursing note and vitals reviewed.      Assessment:       1. Injury of cervical spinal cord, subsequent encounter    2. Chronic pain due to trauma    3. Hyperlipidemia, unspecified hyperlipidemia type        Plan:   Injury of cervical spinal cord, subsequent encounter    Chronic pain due to trauma    Hyperlipidemia, unspecified hyperlipidemia type    Other orders  -     rosuvastatin (CRESTOR) 40 MG Tab; Take 1 tablet (40  mg total) by mouth once daily.  Dispense: 90 tablet; Refill: 3  -     LYRICA 100 mg capsule; Take 1 capsule (100 mg total) by mouth 3 (three) times daily.  Dispense: 90 capsule; Refill: 5  -     clopidogrel (PLAVIX) 75 mg tablet; Take 1 tablet (75 mg total) by mouth once daily.  Dispense: 90 tablet; Refill: 3  -     midodrine (PROAMATINE) 5 MG Tab; Take 1 tablet (5 mg total) by mouth 3 (three) times daily with meals.  Dispense: 90 tablet; Refill: 5  -     traMADol (ULTRAM) 50 mg tablet; Take 1 tablet (50 mg total) by mouth every 6 (six) hours as needed.  Dispense: 40 tablet; Refill: 2  -     Discontinue: oxyCODONE (ROXICODONE) 10 mg Tab immediate release tablet; Take 1 tablet (10 mg total) by mouth 2 (two) times daily as needed for Pain. Medically necessary  Dispense: 40 tablet; Refill: 0  -     oxyCODONE (ROXICODONE) 10 mg Tab immediate release tablet; Take 1 tablet (10 mg total) by mouth 2 (two) times daily as needed for Pain. > than 7 days Medically necessary  Dispense: 40 tablet; Refill: 0        No follow-ups on file.    There are no Patient Instructions on file for this visit.

## 2019-08-27 NOTE — TELEPHONE ENCOUNTER
----- Message from Artis Rodriguez sent at 8/27/2019  3:43 PM CDT -----  Contact: [facility]   Type:  Pharmacy Calling to Clarify an RX    Name of Caller: candelaria  Pharmacy Name:     [place], [place] - [address]  [facility] [address]  Phone: [phone] Fax: [phone]    Prescription Name:  oxyCODONE  What do they need to clarify?: they need a New Rx   Best Call Back Number:  Additional Information: the Rx needs to say greater than 7 days medically necessary          Type:  Pharmacy Calling to Clarify an RX    Name of Caller: [facility]  Pharmacy Name:     [place] - [address]  [facility] [address]  Phone: [phone] Fax: [phone]    Prescription Name: traMADol   What do they need to clarify?: they need a New Rx   Best Call Back Number:  Additional Information: the Rx needs to say greater than 7 days medically necessary

## 2019-08-27 NOTE — TELEPHONE ENCOUNTER
Prescription Name:  oxyCODONE   What do they need to clarify?: they need a New Rx   Best Call Back Number:   Additional Information: the Rx needs to say greater than 7 days medically necessary

## 2019-08-28 ENCOUNTER — PATIENT MESSAGE (OUTPATIENT)
Dept: FAMILY MEDICINE | Facility: CLINIC | Age: 69
End: 2019-08-28

## 2019-08-28 RX ORDER — OXYCODONE HYDROCHLORIDE 10 MG/1
10 TABLET ORAL 2 TIMES DAILY PRN
Qty: 40 TABLET | Refills: 0 | Status: SHIPPED | OUTPATIENT
Start: 2019-08-28 | End: 2021-03-22

## 2019-09-04 RX ORDER — CLOPIDOGREL BISULFATE 75 MG/1
75 TABLET ORAL DAILY
Qty: 90 TABLET | Refills: 3 | Status: SHIPPED | OUTPATIENT
Start: 2019-09-04

## 2019-09-05 ENCOUNTER — TELEPHONE (OUTPATIENT)
Dept: FAMILY MEDICINE | Facility: CLINIC | Age: 69
End: 2019-09-05

## 2019-09-05 NOTE — TELEPHONE ENCOUNTER
----- Message from Willian Cortez sent at 9/5/2019  1:13 PM CDT -----  Contact: Luis/Physical Therapist at [place]  Luis called in and wanted to make sure office had received a fax from him for patient to continue his physical therapy?  Luis stated it was faxed over on 9/3/19.    Luis's call back number is [phone]

## 2019-09-10 ENCOUNTER — PATIENT MESSAGE (OUTPATIENT)
Dept: FAMILY MEDICINE | Facility: CLINIC | Age: 69
End: 2019-09-10

## 2019-09-25 ENCOUNTER — PATIENT OUTREACH (OUTPATIENT)
Dept: OTHER | Facility: OTHER | Age: 69
End: 2019-09-25

## 2019-10-01 ENCOUNTER — TELEPHONE (OUTPATIENT)
Dept: FAMILY MEDICINE | Facility: CLINIC | Age: 69
End: 2019-10-01

## 2019-10-01 NOTE — TELEPHONE ENCOUNTER
Pt states that he would like one more refill of his pain medication to hold him over until he can get an appt with the pain management clinic down the road.

## 2019-10-01 NOTE — TELEPHONE ENCOUNTER
----- Message from Shari Valentine sent at 10/1/2019 11:57 AM CDT -----  Contact: Pt  Please give pt a call at [phone] regarding some questions about a medication.

## 2019-10-01 NOTE — TELEPHONE ENCOUNTER
I am sorry but I see in the  program that the patient is currently enrolled with a pain management doctor who is providing for him to get pregabalin.  He needs to work with him to get pain medicine.  Jeff Reyes MD is his doctor for this.

## 2019-10-02 NOTE — PROGRESS NOTES
"Digital Medicine: Health  Follow-Up    Checked in briefly with Mr. Dover to introduce myself to him as his new health  - he reports that his home nurse had arrived but he wanted the opportunity to have a quick check in.    He explained that he fell in March and that he is paralyzed from the waist down as a result of his fall. He describes his blood pressure as variable and believes that his medication is prescribed to help stabilize the "highs and the lows".     He discussed that he occasionally has symptoms of low blood pressure and when he does he "puts his feet up and relaxes", and believes that it helps him to feel better. We discussed that he can drink water and eat a salty snack should he experience symptoms of hypotension in the future.           Assessment/Plan    SDOH        PLAN  patient verbalizes understanding and demonstrates understanding via teach back      There are no preventive care reminders to display for this patient.    Last 5 Patient Entered Readings                                      Current 30 Day Average: 121/66     Recent Readings 9/24/2019 9/24/2019 9/23/2019 9/23/2019 9/16/2019    SBP (mmHg) 110 107 155 155 104    DBP (mmHg) 60 58 90 83 53    Pulse 74 80 67 68 65                "

## 2019-10-03 NOTE — PROGRESS NOTES
10/3: Reviewed chart.  Pt spoke to HC on 10/2.  At goal on NO medication with no recorded lows since adjustment in midodrine dosing.

## 2019-11-06 ENCOUNTER — PATIENT OUTREACH (OUTPATIENT)
Dept: OTHER | Facility: OTHER | Age: 69
End: 2019-11-06

## 2019-11-06 NOTE — PROGRESS NOTES
Digital Medicine: Clinician Follow-Up    11/6: Called pt as received Low BP alert: 71/44.  He states that he is feeling weak and lightheaded.  States that he is currently lying with his feet elevated.  States that he took the midodrine as prescribed this morning.  Instructed him to retake the reading and if it remains low and he is symptomatic, he should call 911.  He verbalized understanding.      The history is provided by the patient. No  was used.     Follow Up  Follow-up reason(s): reading review      Alert received.   Care Team received low BP alert.  Patient is experiencing symptomsof hypotension.        INTERVENTION(S)  encouragement/support    PLAN  patient verbalizes understanding    Instructed him to call 911 if not improving in 30-60 minutes.  Pt is paraplegic with UTI on abx. May be developing sepsis. Denies fever at present.        There are no preventive care reminders to display for this patient.    Last 5 Patient Entered Readings                                      Current 30 Day Average: 108/71     Recent Readings 11/6/2019 11/6/2019 11/2/2019 10/30/2019 10/30/2019    SBP (mmHg) 71 78 126 87 101    DBP (mmHg) 44 42 69 54 68    Pulse 79 78 196 82 97                        Screenings

## 2019-11-08 ENCOUNTER — PATIENT OUTREACH (OUTPATIENT)
Dept: OTHER | Facility: OTHER | Age: 69
End: 2019-11-08

## 2019-11-08 NOTE — PROGRESS NOTES
"Digital Medicine: Health  Follow-Up    Patient reports that his blood pressure is "going up and down", but that he had tests done yesterday and he believes that his cardiologist will try to schedule a follow up appointment with him within the month.    We reviewed the importance of staying well hydrated with at least 64 ounces of water a day. Mr. Dover explained that his nurse yesterday also reiterated that with him, and counseled him to have a salty snack and some water when he experiences another hypotensive episode accompanied with symptoms.           Intervention/Plan    There are no preventive care reminders to display for this patient.    Last 5 Patient Entered Readings                                      Current 30 Day Average: 114/70     Recent Readings 11/6/2019 11/6/2019 11/6/2019 11/6/2019 11/2/2019    SBP (mmHg) 156 162 71 78 126    DBP (mmHg) 86 89 44 42 69    Pulse 73 73 79 78 196                  Screenings    SDOH  "

## 2019-11-27 ENCOUNTER — PATIENT OUTREACH (OUTPATIENT)
Dept: OTHER | Facility: OTHER | Age: 69
End: 2019-11-27

## 2019-11-27 NOTE — PROGRESS NOTES
Digital Medicine: Clinician Follow-Up    11/27/19: Called and spoke to pt's wife about low BP alerts. She states that they took it once in each arm and it was low so he laid down with feet elevated and 3rd reading is normal.  He was asymptomatic.     The history is provided by the spouse.     Follow Up  Follow-up reason(s): reading review      Alert received.   Care Team received low BP alert.  Patient is not experiencing symptoms.      INTERVENTION(S)  encouragement/support    PLAN  continue monitoring    Still taking Midodrine.  Pt's wife states that she will send log of readings to the Cardiologist in 10 days.       There are no preventive care reminders to display for this patient.    Last 5 Patient Entered Readings                                      Current 30 Day Average: 121/68     Recent Readings 11/27/2019 11/27/2019 11/27/2019 11/26/2019 11/26/2019    SBP (mmHg) 130 85 87 139 154    DBP (mmHg) 73 51 46 79 74    Pulse 81 99 91 71 79                                    Medication Adherence Screening   He misses doses: never

## 2019-12-03 ENCOUNTER — TELEPHONE (OUTPATIENT)
Dept: HOME HEALTH SERVICES | Facility: HOSPITAL | Age: 69
End: 2019-12-03

## 2019-12-03 ENCOUNTER — PATIENT OUTREACH (OUTPATIENT)
Dept: OTHER | Facility: OTHER | Age: 69
End: 2019-12-03

## 2019-12-03 NOTE — PROGRESS NOTES
Digital Medicine: Clinician Follow-Up    12/3: Spoke to patient about Low BP Alert.  States feeling OK and getting ready to go to Physical Therapy.  Has difficult to manage BP because of spinal cord injury.      The history is provided by the patient.     Follow Up  Follow-up reason(s): reading review      Alert received.   Care Team received low BP alert.  Patient is not experiencing symptoms.      INTERVENTION(S)  encouragement/support    PLAN  continue monitoring    Pt asymptomatic with fluctuations in BP.        There are no preventive care reminders to display for this patient.    Last 5 Patient Entered Readings                                      Current 30 Day Average: 126/68     Recent Readings 12/3/2019 12/3/2019 12/3/2019 12/2/2019 12/2/2019    SBP (mmHg) 85 77 171 115 156    DBP (mmHg) 49 43 91 63 77    Pulse 76 81 69 65 54                                    Medication Adherence Screening   He misses doses: never

## 2019-12-11 ENCOUNTER — PATIENT OUTREACH (OUTPATIENT)
Dept: OTHER | Facility: OTHER | Age: 69
End: 2019-12-11

## 2019-12-11 NOTE — PROGRESS NOTES
"Digital Medicine: Health  Follow-Up    HPI    INTERVENTION(S)  encouragement/support    PLAN  Clinician follow-up      There are no preventive care reminders to display for this patient.    Last 5 Patient Entered Readings                                      Current 30 Day Average: 127/69     Recent Readings 12/7/2019 12/5/2019 12/4/2019 12/3/2019 12/3/2019    SBP (mmHg) 122 124 172 85 85    DBP (mmHg) 79 82 93 53 49    Pulse 87 87 75 81 76                      Diet Screening       Patient reports that he has been drinking at least 60 ounces of water a day. He discussed how important it is, not only for his blood pressure, but also for his overall health.     Physical Activity Screening       [facility] and will be discharged 12/18. He explained that he is staying there to give his care taker a break and that he has been resting and not been as active as he is at home. They have been monitoring his blood pressure in the hospital, which he describes as "less variable than his home readings", and attributes it to his increased rest.    Medication Adherence Screening       Patient explained that he only takes his blood pressure medication if it is below a certain value but noted that he doesn't think that his digital medicine clinician is aware that he is dosing in this way. I suggested that he discuss it with her to make sure that she can work with him effectively, and he requested that I ask her to reach out to him.       SDOH  "

## 2019-12-11 NOTE — PROGRESS NOTES
12/11/2019  HC routed chart to me as pt told him that he is not using midodrine every day depending on his BP readings.   I will contact him next week.    Last 5 Patient Entered Readings                                      Current 30 Day Average: 127/69     Recent Readings 12/7/2019 12/5/2019 12/4/2019 12/3/2019 12/3/2019    SBP (mmHg) 122 124 172 85 85    DBP (mmHg) 79 82 93 53 49    Pulse 87 87 75 81 76

## 2019-12-17 ENCOUNTER — PES CALL (OUTPATIENT)
Dept: ADMINISTRATIVE | Facility: CLINIC | Age: 69
End: 2019-12-17

## 2019-12-18 ENCOUNTER — PATIENT OUTREACH (OUTPATIENT)
Dept: OTHER | Facility: OTHER | Age: 69
End: 2019-12-18

## 2019-12-18 ENCOUNTER — HOSPITAL ENCOUNTER (OUTPATIENT)
Dept: RADIOLOGY | Facility: HOSPITAL | Age: 69
Discharge: HOME | End: 2019-12-18
Attending: NEUROLOGICAL SURGERY
Payer: MEDICARE

## 2019-12-18 DIAGNOSIS — M54.2 CERVICALGIA: ICD-10-CM

## 2019-12-18 PROCEDURE — 71046 X-RAY EXAM CHEST 2 VIEWS: CPT | Mod: 26,HCNC,, | Performed by: RADIOLOGY

## 2019-12-18 PROCEDURE — 72040 X-RAY EXAM NECK SPINE 2-3 VW: CPT | Mod: 26,HCNC,, | Performed by: RADIOLOGY

## 2019-12-18 PROCEDURE — 71046 XR CHEST PA AND LATERAL: ICD-10-PCS | Mod: 26,HCNC,, | Performed by: RADIOLOGY

## 2019-12-18 PROCEDURE — 72040 XR CERVICAL SPINE AP LATERAL: ICD-10-PCS | Mod: 26,HCNC,, | Performed by: RADIOLOGY

## 2019-12-18 PROCEDURE — 71046 X-RAY EXAM CHEST 2 VIEWS: CPT | Mod: TC,HCNC,PO

## 2019-12-18 PROCEDURE — 72040 X-RAY EXAM NECK SPINE 2-3 VW: CPT | Mod: TC,HCNC,PO

## 2019-12-18 NOTE — PROGRESS NOTES
Digital Medicine: Clinician Follow-Up    12/18/19: Spoke to patient in response to Task placed by HC to clarify the way he is using Midodrine.  Pt states that he takes his BP readings and HOLDS the midodrine if reading is high.  I told him that is not usually the way the medication is used.  He is dropping off his readings to the Cardiologist who prescribed it soon so will wait for that doctor's interpretation.  I will f/u in 1 month.     The history is provided by the patient. No  was used.     Follow Up  Follow-up reason(s): responding to task          INTERVENTION(S)  encouragement/support    PLAN  await MD intervention and continue monitoring    F/U in 1 month to find out recommendation of Cardiologist.       There are no preventive care reminders to display for this patient.    Last 5 Patient Entered Readings                                      Current 30 Day Average: 126/70     Recent Readings 12/18/2019 12/18/2019 12/18/2019 12/18/2019 12/7/2019    SBP (mmHg) 90 161 161 150 122    DBP (mmHg) 56 88 91 80 79    Pulse 77 77 77 79 87                        Screenings

## 2019-12-24 ENCOUNTER — PATIENT OUTREACH (OUTPATIENT)
Dept: OTHER | Facility: OTHER | Age: 69
End: 2019-12-24

## 2019-12-24 NOTE — PROGRESS NOTES
Digital Medicine: Health  Follow-Up    Checked in with patient to follow up on his recent higher readings, although it seems to be usual for him to have lower readings as well as higher readings, his higher readings lately are more elevated. He reports that he does not have symptoms such as chest pain or severe headache. He explained that he has an appointment with his cardiologist and plans to discuss it with him at that time.           Intervention/Plan    There are no preventive care reminders to display for this patient.    Last 5 Patient Entered Readings                                      Current 30 Day Average: 127/74     Recent Readings 12/23/2019 12/23/2019 12/22/2019 12/21/2019 12/21/2019    SBP (mmHg) 105 188 112 125 152    DBP (mmHg) 66 93 60 81 82    Pulse 84 81 88 79 74                  Screenings    SDOH

## 2020-01-06 ENCOUNTER — PATIENT OUTREACH (OUTPATIENT)
Dept: OTHER | Facility: OTHER | Age: 70
End: 2020-01-06

## 2020-01-06 NOTE — PROGRESS NOTES
Digital Medicine: Clinician Follow-Up    01/06/2020  Called and LM re: LOW BP alert. Ask if using Midodrine.     01/13/2020  Spoke to patient.  Readings are very labile, likely from spinal cord injury.  Pt reports feeling weak when BP is low.  Instructed him to continue midodrine as prescribed.  He sees his cardiologist, Dr. Anatoliy Oviedo, sometime this month.      The history is provided by the patient. No  was used.     Follow Up  Follow-up reason(s): reading review      Alert received.   Care Team received low BP alert.  Patient is experiencing symptoms      INTERVENTION(S)  encouragement/support    PLAN  continue monitoring    Pt states that he has checked accuracy of digital cuff against manual cuff of HH nurse.  States that it is accurate.  Suggested that he treat the low BP readings with lying down with legs elevated slightly, water and a small amount of salt if needed.           Last 5 Patient Entered Readings                                      Current 30 Day Average: 120/76     Recent Readings 1/9/2020 1/9/2020 1/9/2020 1/9/2020 1/9/2020    SBP (mmHg) 112 166 76 157 98    DBP (mmHg) 75 90 52 88 64    Pulse 86 91 95 91 90                     Screenings

## 2020-01-22 ENCOUNTER — DOCUMENTATION ONLY (OUTPATIENT)
Dept: OTHER | Facility: OTHER | Age: 70
End: 2020-01-22

## 2020-01-22 ENCOUNTER — PATIENT OUTREACH (OUTPATIENT)
Dept: OTHER | Facility: OTHER | Age: 70
End: 2020-01-22
Payer: MEDICARE

## 2020-01-22 DIAGNOSIS — I10 ESSENTIAL HYPERTENSION: Primary | ICD-10-CM

## 2020-01-22 NOTE — PROGRESS NOTES
"Digital Medicine: Health  Follow-Up    Patient reports that he sleeps at least seven hours of restful sleep a night and that he describes himself as generally "relaxed".           INTERVENTION(S)  encouragement/support    PLAN  Clinician follow-up      There are no preventive care reminders to display for this patient.    Last 5 Patient Entered Readings                                      Current 30 Day Average: 119/71     Recent Readings 1/20/2020 1/20/2020 1/17/2020 1/17/2020 1/17/2020    SBP (mmHg) 102 79 130 155 73    DBP (mmHg) 67 55 88 84 49    Pulse 88 87 79 76 79                      Diet Screening   He has the following dietary restrictions: low sodium diet    Patient reports that he restricts salt intake by avoiding sauces, table salt, junk and processed foods. He has two glasses of wine per week. He limits his intake of caffeine. He does not use tobacco products.     Physical Activity Screening   When asked if exercising, patient responded: yes  Patient has limited mobility: wheelchair    He exercises for 120 minutes per day 2 day(s) a week.  His level of intensity when exercising is low.    Patient participates in the following activities: physical therapy/rehab    He identified the following barriers to physical activity: limited mobility and pain/injury/recent surgery    Patient reports that he does PT twice a week, and  hopes that he can increase it to three times a week. He explained that he is feeling stronger lately, as he is able to move himself around in bed using his upper body strength. He is [facility] [place]. If he is admitted he will participate for 4-6 weeks and will be doing PT five days a week.     Medication Adherence Screening   He misses doses: more than once a week      Patient identified the following reasons for non-compliance: lack of perceived benefit    Patient reports that he has discontinued taking his " Midodrine about two weeks ago. He was concerned that it may have been contributing to the contrasting highs and lows. He requests that his digital medicine clinician reaches out to him to discuss this further.      CONCEPCION

## 2020-01-22 NOTE — PROGRESS NOTES
01/22/2020  Pt spoke to  today who forwarded me his note. Pt admits to not taking Midodrine for past 2 weeks to determine whether it would eliminate the fluctuating BP readings from high to low.  I found the following on Up To Date:    SUPINE HYPERTENSION -- A common problem in patients with orthostatic hypotension is the concurrent presence of supine hypertension. Drugs effective in managing hypertension may exacerbate orthostatic hypotension, while treatment of orthostatic hypotension may increase supine hypertension. In many such patients, the best that can be achieved is to maximize nonpharmacologic measures and to use drugs that might raise the supine blood pressure only to the degree that permits the patient to ambulate.  Supine hypertension in patients with chronic autonomic failure can result in end-organ damage [72,73]. It is uncertain what threshold of blood pressure requires treatment in this setting. Blood pressure guidelines used in the general population will likely exacerbate orthostatic symptoms in these patients.  No treatment approach to supine hypertension has been systematically evaluated. To offset supine hypertension, patients should avoid lying down during the day, especially after taking pressor agents, and, if tired, should rest in a seated position. Dosing of medications to avoid administration within four hours of bedtime is also advised [5]. At night, patients should sleep in a semisitting position with the head of the bed raised by at least 30 to 45 degrees.   No pharmacologic strategy can yet be recommended for the treatment of supine hypertension in patients with neurogenic orthostatic hypotension. A transdermal nitroglycerin patch (0.025 to 0.1 mg/hour), which is removed in the morning prior to the assumption of an upright position, has been proposed [74,75]. Other short-acting antihypertensive agents (eg, captopril, hydralazine) may also be tried [23]. Significant hypotension may  result in some patients; thus, the dose must be individually titrated and tailored. To avoid syncope and dangerous falls, patients should be cautioned to be extremely careful if they must arise at night, since orthostatic symptoms will be exacerbated.      Will discuss with patient on next call.      Clotilde Myers, MPH, PA-C  [facility]  [phone]

## 2020-01-28 ENCOUNTER — PATIENT OUTREACH (OUTPATIENT)
Dept: OTHER | Facility: OTHER | Age: 70
End: 2020-01-28

## 2020-01-28 NOTE — PROGRESS NOTES
Digital Medicine: Clinician Follow-Up    01/28/2020  Following up on message from  that patient stopped taking Midodrine @ 1/8/20 to determine whether that would eliminate the HIGH readings that are likely 2/2 supine hypertension.  Found the following on Up To Date:      SUPINE HYPERTENSION - A common problem in patients with orthostatic hypotension is the concurrent presence of supine hypertension. Drugs effective in managing hypertension may exacerbate orthostatic hypotension, while treatment of orthostatic hypotension may increase supine hypertension. In many such patients, the best that can be achieved is to maximize nonpharmacologic measures and to use drugs that might raise the supine blood pressure only to the degree that permits the patient to ambulate.    Supine hypertension in patients with chronic autonomic failure can result in end-organ damage (72,73). It is uncertain what threshold of blood pressure requires treatment in this setting. Blood pressure guidelines used in the general population will likely exacerbate orthostatic symptoms in these patients.    No treatment approach to supine hypertension has been systematically evaluated. To offset supine hypertension, patients should avoid lying down during the day, especially after taking pressor agents, and, if tired, should rest in a seated position. Dosing of medications to avoid administration within four hours of bedtime is also advised (5). At night, patients should sleep in a semisitting position with the head of the bed raised by at least 30 to 45 degrees.     No pharmacologic strategy can yet be recommended for the treatment of supine hypertension in patients with neurogenic orthostatic hypotension. A transdermal nitroglycerin patch (0.025 to 0.1 mg/hour), which is removed in the morning prior to the assumption of an upright position, has been proposed (74,75). Other short-acting antihypertensive agents (eg, captopril, hydralazine) may also be  tried (23). Significant hypotension may result in some patients; thus, the dose must be individually titrated and tailored. To avoid syncope and dangerous falls, patients should be cautioned to be extremely careful if they must arise at night, since orthostatic symptoms will be exacerbated.    We agreed that he will remain OFF of the medication.  States that he is happy with his BP readings and currently in therapy which is making him stronger.  States that he feels slightly light headed when BP is low but raises his legs and it improves quickly.      The history is provided by the patient. No  was used.     Follow Up  Follow-up reason(s): reading review          INTERVENTION(S)  encouragement/support    PLAN  continue monitoring    Pt will remain OFF of the Midodrine for now.  Will check his readings at least monthly.            Last 5 Patient Entered Readings                                      Current 30 Day Average: 113/68     Recent Readings 1/26/2020 1/26/2020 1/24/2020 1/20/2020 1/20/2020    SBP (mmHg) 120 93 107 102 79    DBP (mmHg) 75 66 67 67 55    Pulse 92 94 90 88 87                        Screenings

## 2020-02-17 ENCOUNTER — PATIENT OUTREACH (OUTPATIENT)
Dept: OTHER | Facility: OTHER | Age: 70
End: 2020-02-17

## 2020-02-17 NOTE — PROGRESS NOTES
"Digital Medicine: Clinician Follow-Up    02/17/2020  Called patient as received ALERT about elevated BP.  He did experience signs and symptoms of elevated BP: headache. States that he had self-catheterized himself but continued leaking urine. He had his wife place a condom catheter on him. Started "feeling bad" so took his BP and it was elevated.  Repeated the reading a few times and it remained elevated so he went to the ED at [facility].  They removed the condom catheter and performed an in/out catheterization which resulted in over 900 cc of urine.  His BP dropped down at that point but they consulted his Cardiologist who was on call as his troponin was elevated.  He did a cardiac catheterization that resulted in clean coronary arteries.  Suspect that the elevated troponin was from urinary retention and hypertensive crisis.  Pt admits to constipation despite his bowel regimen which is likely contributing to the urinary retention.  He is still in the hospital and will likely adjust his home bowel regimen. Expects Discharge today.        The history is provided by the patient. No  was used.     Follow Up  Follow-up reason(s): reading review      Alert received.   Care Team received high BP alert.  Patient is experiencing symptomsof hypertension.        INTERVENTION(S)  encouragement/support    PLAN  continue monitoring    Pt hospitalized for hypertensive crisis likely 2/2 urinary retention likely 2/2 constipation.  He expects Discharge today and will adjust his bowel regimen accordingly.  Will f/u with Urologist if urinary retention persists.            Last 5 Patient Entered Readings                                      Current 30 Day Average: 132/75     Recent Readings 2/15/2020 2/15/2020 2/15/2020 2/15/2020 2/11/2020    SBP (mmHg) 200 215 197 205 122    DBP (mmHg) 121 114 105 100 85    Pulse 121 94 97 113 76                        Screenings  "

## 2020-02-19 ENCOUNTER — PATIENT OUTREACH (OUTPATIENT)
Dept: OTHER | Facility: OTHER | Age: 70
End: 2020-02-19

## 2020-02-19 NOTE — PROGRESS NOTES
Digital Medicine: Clinician Follow-Up    02/19/2020  Called and spoke to pt's wife about his low BP readings this morning.  She was assisting him into a van as he just completed a dentist appt.  States that she gave him Midodrine when he was very low and his lightheadedness resolved.  Will discuss this pt with Dr. Kline as he may not be appropriate for me to manage with his spinal cord injury.      The history is provided by the spouse. No  was used.     Follow Up  Follow-up reason(s): reading review      Alert received.   Care Team received low BP alert.  Patient is experiencing symptoms      INTERVENTION(S)  encouragement/support    PLAN  continue monitoring    Will discuss this patient with Dr. Kline to determine if he is eligible to remain in the Program.            Last 5 Patient Entered Readings                                      Current 30 Day Average: 127/69     Recent Readings 2/19/2020 2/19/2020 2/19/2020 2/19/2020 2/19/2020    SBP (mmHg) 109 129 66 117 87    DBP (mmHg) 68 68 52 80 54    Pulse 64 64 72 81 88                        Screenings

## 2020-02-20 ENCOUNTER — PATIENT OUTREACH (OUTPATIENT)
Dept: ADMINISTRATIVE | Facility: CLINIC | Age: 70
End: 2020-02-20

## 2020-02-20 ENCOUNTER — DOCUMENTATION ONLY (OUTPATIENT)
Dept: OTHER | Facility: OTHER | Age: 70
End: 2020-02-20

## 2020-02-20 RX ORDER — BACLOFEN 10 MG/1
10 TABLET ORAL 3 TIMES DAILY
COMMUNITY

## 2020-02-20 RX ORDER — FLUDROCORTISONE ACETATE 0.1 MG/1
100 TABLET ORAL DAILY
COMMUNITY
End: 2020-04-06

## 2020-02-20 RX ORDER — DOCUSATE SODIUM 100 MG/1
100 CAPSULE, LIQUID FILLED ORAL 2 TIMES DAILY
COMMUNITY

## 2020-02-20 RX ORDER — FUROSEMIDE 20 MG/1
20 TABLET ORAL
COMMUNITY
End: 2021-05-19

## 2020-02-20 RX ORDER — NITROFURANTOIN 25; 75 MG/1; MG/1
100 CAPSULE ORAL 2 TIMES DAILY
COMMUNITY
End: 2021-05-19

## 2020-02-20 RX ORDER — LANOLIN ALCOHOL/MO/W.PET/CERES
400 CREAM (GRAM) TOPICAL DAILY
COMMUNITY
End: 2021-03-22

## 2020-02-20 RX ORDER — POLYETHYLENE GLYCOL 3350 17 G/17G
POWDER, FOR SOLUTION ORAL
COMMUNITY

## 2020-02-20 NOTE — PATIENT INSTRUCTIONS
Bladder Infection, Male (Adult)    You have a bladder infection.  Urine is normally free of bacteria. But bacteria can get into the urinary tract from the skin around the rectum or it may travel in the blood from elsewhere in the body.  This is called a urinary tract infection (UTI). An infection can occur anywhere in the urinary tract. It could be in a kidney (pyelonephritis)or in the bladder (cystitis) and urethra (urethritis). The urethra is the tube that drains the urine from the bladder through the tip of the penis.  The most common place for a UTI is in the bladder. This is called a bladder infection. Most bladder infections are easily treated. They are not serious unless the infection spreads up to the kidney.  The terms bladder infection, UTI, and cystitis are often used to describe the same thing, but they arent always the same. Cystitis is an inflammation of the bladder. The most common cause of cystitis is an infection.   Keep in mind:  · Infections in the urine are called UTIs.  · Cystitis is usually caused by a UTI.  · Not all UTIs and cases of cystitis are bladder infections.  · Bladder infections are the most common type of cystitis.  Symptoms of a bladder infection  The infection causes inflammation in the urethra and bladder. This inflammation causes many of the symptoms. The most common symptoms of a bladder infection are:  · Pain or burning when urinating  · Having to go more often than usual  · Feeling like you need to go right away  · Only a small amount comes out  · Blood in urine  · Discomfort in your belly (abdomen), usually in the lower abdomen, above the pubic bone  · Cloudy, strong, or bad smelling urine  · Unable to urinate (retention)  · Urinary incontinence  · Fever  · Loss of appetite  Older adults may also feel confused.  Causes of a bladder infection  Bladder infections are not contagious. You can't get one from someone else, from a toilet seat, or from sharing a bath.  The most  common cause of bladder infections is bacteria from the bowels. The bacteria get onto the skin around the opening of the urethra. From there they can get into the urine and travel up to the bladder. This causes inflammation and an infection. This usually happens because of:  · An enlarged prostate  · Poor cleaning of the genitals  · Procedures that put a tube in your bladder, like a Lam catheter  · Bowel incontinence  · Older age  · Not emptying your bladder (The urine stays there, giving the bacteria a chance to grow.)  · Dehydration (This allows urine to stay in the bladder longer.)  · Constipation (This can cause the bowels to push on the bladder or urethra and keep the bladder from emptying.)  Treatment  Bladder infections are treated with antibiotics. They usually clear up quickly without complications. Treatment helps prevent a more serious kidney infection.  Medicines  Medicines can help in the treatment of a bladder infection:  · You may have been given phenazopyridine to ease burning when you urinate. It will cause your urine to be bright orange. It can stain clothing.  · You may have been prescribed antibiotics. Take this medicine until you have finished it, even if you feel better. Taking all of the medicine will make sure the infection has cleared.  You can use acetaminophen or ibuprofen for pain, fever, or discomfort, unless another medicine was prescribed. You can also alternate them, or use both together. They work differently and are a different class of medicines, so taking them together is not an overdose. If you have chronic liver or kidney disease, talk with your healthcare provider before using these medicines. Also talk with your provider if youve had a stomach ulcer or GI bleeding or are taking blood thinner medicines.  Home care  Here are some guidelines to help you care for yourself at home:  · Drink plenty of fluids, unless your healthcare provider told you not to. Fluids will prevent  dehydration and flush out your bladder.  · Use good personal hygiene. Wipe from front to back after using the toilet, and clean your penis regularly. If you arent circumcised, retract the foreskin when cleaning.  · Urinate more frequently, and dont try to hold it in for long periods of time, if possible.  · Wear loose-fitting clothes and cotton underwear. Avoid tight-fitting pants. This helps keep you clean and dry.  · Change your diet to prevent constipation. This means eating more fresh foods and more fiber, and less junk and fatty foods.  · Avoid sex until your symptoms are gone.  · Avoid caffeine, alcohol, and spicy foods. These can irritate the bladder.  Follow-up care  Follow up with your healthcare provider, or as advised if all symptoms have not cleared up within 5 days. It is important to keep your follow-up appointment. You can talk with your provider to see if you need more tests of the urinary tract. This is especially important if you have infections that keep coming back.  If a culture was done, you will be told if your treatment needs to be changed. If directed, you can call to find out the results.  If X-rays were taken, you will be told of any findings that may affect your care.  Call 911  Call 911 if any of these occur:  · Trouble breathing  · Difficulty waking up  · Feeling confused  · Fainting or loss of consciousness  · Rapid heart rate  When to seek medical advice  Call your healthcare provider right away if any of these occur:  · Fever of 100.4ºF (38ºC) or higher, or as directed by your healthcare provider  · Your symptoms dont improve after 2 days of treatment  · Back or abdominal pain that gets worse  · Repeated vomiting, or you arent able to keep medicine down  · Weakness or dizziness  Date Last Reviewed: 10/1/2016  © 3080-8646 [employer]. [address] [place] [address], [place] [address]. All rights reserved. This information is not intended as a substitute for professional  medical care. Always follow your healthcare professional's instructions.

## 2020-02-20 NOTE — PROGRESS NOTES
02/20/2020  Discussed patient with Dr. Kline.  We agree that he is not appropriate for this Hypertension Digital Medicine Program since he is not on any blood pressure lowering medication and has usually normal blood pressure with the exception of occasional widely fluctuating readings perhaps from pain or autonomic dysfunction since his spinal cord injury.    I will contact the patient and discharge him from the Program.    Clotilde Myers, MPH, PA-C  [facility]  [phone]

## 2020-02-21 ENCOUNTER — OFFICE VISIT (OUTPATIENT)
Dept: FAMILY MEDICINE | Facility: CLINIC | Age: 70
End: 2020-02-21
Payer: MEDICARE

## 2020-02-21 ENCOUNTER — PES CALL (OUTPATIENT)
Dept: ADMINISTRATIVE | Facility: CLINIC | Age: 70
End: 2020-02-21

## 2020-02-21 VITALS
DIASTOLIC BLOOD PRESSURE: 62 MMHG | HEART RATE: 82 BPM | SYSTOLIC BLOOD PRESSURE: 99 MMHG | HEIGHT: 70 IN | TEMPERATURE: 98 F | WEIGHT: 194 LBS | BODY MASS INDEX: 27.77 KG/M2

## 2020-02-21 DIAGNOSIS — Z09 HOSPITAL DISCHARGE FOLLOW-UP: Primary | ICD-10-CM

## 2020-02-21 DIAGNOSIS — N30.90 CYSTITIS: ICD-10-CM

## 2020-02-21 DIAGNOSIS — I10 HYPERTENSION, UNSPECIFIED TYPE: ICD-10-CM

## 2020-02-21 DIAGNOSIS — G90.4 AUTONOMIC DYSREFLEXIA: ICD-10-CM

## 2020-02-21 DIAGNOSIS — K59.00 CONSTIPATION, UNSPECIFIED CONSTIPATION TYPE: ICD-10-CM

## 2020-02-21 DIAGNOSIS — R79.89 ELEVATED TROPONIN: ICD-10-CM

## 2020-02-21 PROCEDURE — 3074F SYST BP LT 130 MM HG: CPT | Mod: HCNC,CPTII,S$GLB, | Performed by: NURSE PRACTITIONER

## 2020-02-21 PROCEDURE — 1126F PR PAIN SEVERITY QUANTIFIED, NO PAIN PRESENT: ICD-10-PCS | Mod: HCNC,S$GLB,, | Performed by: NURSE PRACTITIONER

## 2020-02-21 PROCEDURE — 99999 PR PBB SHADOW E&M-EST. PATIENT-LVL V: CPT | Mod: PBBFAC,HCNC,, | Performed by: NURSE PRACTITIONER

## 2020-02-21 PROCEDURE — 1100F PTFALLS ASSESS-DOCD GE2>/YR: CPT | Mod: HCNC,CPTII,S$GLB, | Performed by: NURSE PRACTITIONER

## 2020-02-21 PROCEDURE — 3288F FALL RISK ASSESSMENT DOCD: CPT | Mod: HCNC,CPTII,S$GLB, | Performed by: NURSE PRACTITIONER

## 2020-02-21 PROCEDURE — 1159F PR MEDICATION LIST DOCUMENTED IN MEDICAL RECORD: ICD-10-PCS | Mod: HCNC,S$GLB,, | Performed by: NURSE PRACTITIONER

## 2020-02-21 PROCEDURE — 3288F PR FALLS RISK ASSESSMENT DOCUMENTED: ICD-10-PCS | Mod: HCNC,CPTII,S$GLB, | Performed by: NURSE PRACTITIONER

## 2020-02-21 PROCEDURE — 1126F AMNT PAIN NOTED NONE PRSNT: CPT | Mod: HCNC,S$GLB,, | Performed by: NURSE PRACTITIONER

## 2020-02-21 PROCEDURE — 99499 RISK ADDL DX/OHS AUDIT: ICD-10-PCS | Mod: HCNC,S$GLB,, | Performed by: NURSE PRACTITIONER

## 2020-02-21 PROCEDURE — 3078F PR MOST RECENT DIASTOLIC BLOOD PRESSURE < 80 MM HG: ICD-10-PCS | Mod: HCNC,CPTII,S$GLB, | Performed by: NURSE PRACTITIONER

## 2020-02-21 PROCEDURE — 99213 PR OFFICE/OUTPT VISIT, EST, LEVL III, 20-29 MIN: ICD-10-PCS | Mod: HCNC,S$GLB,, | Performed by: NURSE PRACTITIONER

## 2020-02-21 PROCEDURE — 3074F PR MOST RECENT SYSTOLIC BLOOD PRESSURE < 130 MM HG: ICD-10-PCS | Mod: HCNC,CPTII,S$GLB, | Performed by: NURSE PRACTITIONER

## 2020-02-21 PROCEDURE — 1159F MED LIST DOCD IN RCRD: CPT | Mod: HCNC,S$GLB,, | Performed by: NURSE PRACTITIONER

## 2020-02-21 PROCEDURE — 99213 OFFICE O/P EST LOW 20 MIN: CPT | Mod: HCNC,S$GLB,, | Performed by: NURSE PRACTITIONER

## 2020-02-21 PROCEDURE — 99999 PR PBB SHADOW E&M-EST. PATIENT-LVL V: ICD-10-PCS | Mod: PBBFAC,HCNC,, | Performed by: NURSE PRACTITIONER

## 2020-02-21 PROCEDURE — 99499 UNLISTED E&M SERVICE: CPT | Mod: HCNC,S$GLB,, | Performed by: NURSE PRACTITIONER

## 2020-02-21 PROCEDURE — 3078F DIAST BP <80 MM HG: CPT | Mod: HCNC,CPTII,S$GLB, | Performed by: NURSE PRACTITIONER

## 2020-02-21 PROCEDURE — 1100F PR PT FALLS ASSESS DOC 2+ FALLS/FALL W/INJURY/YR: ICD-10-PCS | Mod: HCNC,CPTII,S$GLB, | Performed by: NURSE PRACTITIONER

## 2020-02-21 RX ORDER — AMITRIPTYLINE HYDROCHLORIDE 25 MG/1
25 TABLET, FILM COATED ORAL NIGHTLY PRN
COMMUNITY
End: 2021-03-22

## 2020-02-21 RX ORDER — NITROGLYCERIN 0.4 MG/1
TABLET SUBLINGUAL
COMMUNITY
Start: 2020-02-17 | End: 2021-03-22

## 2020-02-21 RX ORDER — AMLODIPINE BESYLATE 5 MG/1
TABLET ORAL
COMMUNITY
End: 2020-04-06

## 2020-02-21 RX ORDER — METFORMIN HYDROCHLORIDE 500 MG/1
500 TABLET, EXTENDED RELEASE ORAL
COMMUNITY
Start: 2020-02-17 | End: 2021-08-13

## 2020-02-21 RX ORDER — GABAPENTIN 300 MG/1
300 CAPSULE ORAL
COMMUNITY
Start: 2019-05-14 | End: 2020-05-13

## 2020-02-21 NOTE — PATIENT INSTRUCTIONS
F/U with cardiology as scheduled  F[facility]  [facility], steroid; hold for now  Ultram request sent to PCP to approve  Report to ER immediately if symptoms worsen

## 2020-02-22 NOTE — PROGRESS NOTES
"Transitional Care Note  Subjective:       Patient ID: Cruz Dover is a 69 y.o. male.  Chief Complaint: Hospital Follow Up    Family and/or Caretaker present at visit?  Yes.  Diagnostic tests reviewed/disposition: No diagnosic tests pending after this hospitalization.  Disease/illness education: Autonomic dysreflexia, elevated troponin, hypertensive emergency, demand ischemia of myocardium, acute urinary retention, acute cystitis  Home health/community services discussion/referrals: Inpatient PT scheduled in [place] next week per pt; PT in [place] twice weekly  Establishment or re-establishment of referral orders for community resources: Has inpatient PT scheduled in [place] next week.   Discussion with other health care providers: No discussion with other health care providers necessary.   Pt went to ER on 2/15/2020 due to elevated BP; states BP was 180s/100s. Pt states had not had BM in 3-4 d prior to hospital admission. He was prescribed abx for cystitis UTI at discharge; pt and caregiver stated, "He does in and out catheters. He knows when he has a UTI. We're not going to get that medication." Also prescribed metformin, steroids; states informed given for inflammation and the metformin was prescribed "just in case" blood sugar goes up; states will not be getting these medication; [facility] Pt and caregiver also states following up with cardiology today and will inquire about nitroglycerin, lasix use and follow up heart catheterization. Pt states BM have normalized since hospital discharge; states using miralax daily. Requests ultram refill; states has been managed by PCP. Pt has no other complaints today.     HPI  Review of Systems   Constitutional: Negative.    HENT: Negative.    Eyes: Negative.    Respiratory: Negative.    Cardiovascular: Negative.    Gastrointestinal: Negative.    Endocrine: Negative.    Genitourinary: Negative.    Musculoskeletal: Negative.  "   Skin: Negative.    Allergic/Immunologic: Negative.    Neurological: Negative.    Psychiatric/Behavioral: Negative.        Objective:      Physical Exam   Constitutional: He is oriented to person, place, and time. He appears well-developed and well-nourished.   HENT:   Head: Normocephalic.   Right Ear: External ear normal.   Left Ear: External ear normal.   Nose: Nose normal.   Mouth/Throat: Oropharynx is clear and moist.   Eyes: Pupils are equal, round, and reactive to light. Conjunctivae are normal.   Neck: Normal range of motion. Neck supple.   Cardiovascular: Normal rate, regular rhythm and normal heart sounds.   Pulmonary/Chest: Effort normal and breath sounds normal.   Abdominal: Soft. Bowel sounds are normal.   Neurological: He is alert and oriented to person, place, and time.   Skin: Skin is warm and dry. Capillary refill takes 2 to 3 seconds.   Psychiatric: He has a normal mood and affect. His behavior is normal. Judgment and thought content normal.   Nursing note and vitals reviewed.      Assessment:       1. Hospital discharge follow-up    2. Autonomic dysreflexia    3. Hypertension, unspecified type    4. Elevated troponin   5. Cystitis    6.      Constipation, unspecified constipation type   Plan:           Cruz was seen today for hospital follow up.    Diagnoses and all orders for this visit:    Hospital discharge follow-up  Autonomic dysreflexia  Hypertension, unspecified type  Elevated troponin  Cystitis  Constipation, unspecified constipation type  F/U with cardiology as scheduled  [facility]  [facility], steroid; hold for now  Ultram request sent to PCP to approve  Report to ER immediately if symptoms worsen

## 2020-02-24 NOTE — PROGRESS NOTES
Care team received low BP alert however, repeat reading 10 minutes later was within an acceptable range. Continue monitoring for now.     Last 5 Patient Entered Readings                                      Current 30 Day Average: 130/68     Recent Readings 2/24/2020 2/24/2020 2/24/2020 2/23/2020 2/23/2020    SBP (mmHg) 104 80 112 140 79    DBP (mmHg) 59 46 62 79 46    Pulse 71 64 70 61 68

## 2020-02-25 RX ORDER — TRAMADOL HYDROCHLORIDE 50 MG/1
TABLET ORAL
Qty: 40 TABLET | Refills: 2 | OUTPATIENT
Start: 2020-02-25

## 2020-02-25 NOTE — TELEPHONE ENCOUNTER
Medication refused due to failing protocol.    Requested Prescriptions   Pending Prescriptions Disp Refills    traMADol (ULTRAM) 50 mg tablet [Pharmacy Med Name: tramadol 50 mg tablet] 40 tablet 2     Sig: TAKE ONE TABLET BY MOUTH EVERY 6 HOURS AS NEEDED       Narcotics Refill Protocol Passed - 2/24/2020  1:20 PM        Passed - Patient seen within 3 months     Last visit with Pablo Saenz NP: 8/27/2019  Last visit in [facility]: 2/21/2020    Patient's next visit in [facility]: Visit date not found           Passed - Med not refilled within 4 weeks

## 2020-03-04 ENCOUNTER — TELEPHONE (OUTPATIENT)
Dept: OTHER | Facility: OTHER | Age: 70
End: 2020-03-04

## 2020-03-04 DIAGNOSIS — I10 ESSENTIAL HYPERTENSION: Primary | ICD-10-CM

## 2020-03-04 NOTE — TELEPHONE ENCOUNTER
03/04/2020  Called and LM for pt re: dis-enrollment from the Program as he is not an appropriate candidate for medication management for HTN.    Last 5 Patient Entered Readings                                      Current 30 Day Average: 131/68     Recent Readings 2/24/2020 2/24/2020 2/24/2020 2/23/2020 2/23/2020    SBP (mmHg) 104 80 112 140 79    DBP (mmHg) 59 46 62 79 46    Pulse 71 64 70 61 68          03/12/2020  Pt called and reported that he is in Inpatient Rehab at [facility] in [place], [place] ([facility]).  He states that he will stay as long as he can and he plans to be walking before he leaves! States BP there is still up and down there.     We agreed that he will be on [place] for the next 6 weeks and then we will decide whether dis-enrollment is still the best option for him.          Clotilde Myers, MPH, PA-C  [facility]  [phone]

## 2020-03-23 ENCOUNTER — PATIENT MESSAGE (OUTPATIENT)
Dept: ADMINISTRATIVE | Facility: OTHER | Age: 70
End: 2020-03-23

## 2020-03-25 ENCOUNTER — PATIENT OUTREACH (OUTPATIENT)
Dept: OTHER | Facility: OTHER | Age: 70
End: 2020-03-25

## 2020-03-25 NOTE — PROGRESS NOTES
"Digital Medicine: Clinician Follow-Up    03/25/2020  Spoke to patient about low BP on 3/23.  He responded as he "usually" does with drinking fluid and avoiding strenuous activity.  He was sent home from the [facility] in [place] because of COVID-19. He expects to return there when able to complete his therapy.  We agree that he will remain in this Program and I will continue to call him as needed just to check in with him. I reminded him that he is not an appropriate candidate for this Program.  His Health , [facility], will not contact him.  He understands that he will be discharged from Digital Medicine when we return to normal operations.      The history is provided by the patient. No  was used.     Follow Up  Follow-up reason(s): reading review      Alert received.   Care Team received low BP alert.  Patient is experiencing symptomsof hypotension.        INTERVENTION(S)  encouragement/support    PLAN  continue monitoring    We agree that he will remain in this Program and I will continue to call him as needed just to check in with him. I reminded him that he is not an appropriate candidate for this Program.  His Health , [facility], will not contact him.  He understands that he will be discharged from [facility] when we return to normal operations. He states that he IS taking the Midodrine as prescribed. He is NOT taking the Amlodipine at this time.           Last 5 Patient Entered Readings                                      Current 30 Day Average: 135/68     Recent Readings 3/24/2020 3/23/2020 3/23/2020 3/23/2020 3/23/2020    SBP (mmHg) 126 179 149 77 74    DBP (mmHg) 73 91 86 55 48    Pulse 68 66 68 70 63             Hypertension Medications             amLODIPine (NORVASC) 5 MG tablet amlodipine 5 mg tablet    furosemide (LASIX) 20 MG tablet Take 20 mg by mouth as needed.     nitroGLYCERIN (NITROSTAT) 0.4 MG SL tablet                              Medication Adherence " Screening   He did not miss a dose this month.

## 2020-04-06 ENCOUNTER — PATIENT OUTREACH (OUTPATIENT)
Dept: OTHER | Facility: OTHER | Age: 70
End: 2020-04-06

## 2020-04-06 NOTE — PROGRESS NOTES
Digital Medicine: Clinician Follow-Up    04/06/2020  Called and LM re: low BP alert. Ask about Amlodipine. Pt returned my call.  States did not get Amlodipine or Fludrocortisone filled after last hospitalization.  I removed them from Med List.  States no exposure to COVID-19 as not leaving the house.     The history is provided by the patient. No  was used.     Follow Up  Follow-up reason(s): reading review      Alert received.   Care Team received low BP alert.  Patient is not experiencing symptoms.      INTERVENTION(S)  encouragement/support    PLAN  continue monitoring    Pt states feeling OK.  Will continue to monitor through pandemic.            Last 5 Patient Entered Readings                                      Current 30 Day Average: 126/65     Recent Readings 4/6/2020 4/6/2020 4/6/2020 4/6/2020 4/6/2020    SBP (mmHg) 85 76 120 124 145    DBP (mmHg) 48 45 70 70 75    Pulse 75 81 61 58 62             Hypertension Medications             amLODIPine (NORVASC) 5 MG tablet amlodipine 5 mg tablet    furosemide (LASIX) 20 MG tablet Take 20 mg by mouth as needed.     nitroGLYCERIN (NITROSTAT) 0.4 MG SL tablet                  Screenings

## 2020-04-28 ENCOUNTER — PATIENT OUTREACH (OUTPATIENT)
Dept: OTHER | Facility: OTHER | Age: 70
End: 2020-04-28

## 2020-04-28 NOTE — PROGRESS NOTES
Digital Medicine: Clinician Follow-Up    04/28/2020 Spoke to patient re: low BP alert.  States that he got up late and did not take his Midodrine dose.  Took it after the low readings and now BP is increasing. States feeling well.      The history is provided by the patient. No  was used.     Follow Up  Follow-up reason(s): reading review      Alert received.   Care Team received low BP alert.  Patient is experiencing symptomsof hypotension.        INTERVENTION(S)  encouragement/support    PLAN  continue monitoring    Pt realizes the importance of taking the Midodrine as prescribed.            Last 5 Patient Entered Readings                                      Current 30 Day Average: 118/64     Recent Readings 4/28/2020 4/28/2020 4/28/2020 4/25/2020 4/24/2020    SBP (mmHg) 119 74 80 124 100    DBP (mmHg) 70 43 50 65 60    Pulse 59 71 69 65 78             Hypertension Medications             furosemide (LASIX) 20 MG tablet Take 20 mg by mouth as needed.     nitroGLYCERIN (NITROSTAT) 0.4 MG SL tablet                  Screenings

## 2020-05-04 ENCOUNTER — PATIENT MESSAGE (OUTPATIENT)
Dept: FAMILY MEDICINE | Facility: CLINIC | Age: 70
End: 2020-05-04

## 2020-05-04 RX ORDER — ZOLPIDEM TARTRATE 5 MG/1
5 TABLET ORAL NIGHTLY PRN
Qty: 30 TABLET | Refills: 0 | OUTPATIENT
Start: 2020-05-04

## 2020-05-06 ENCOUNTER — PATIENT MESSAGE (OUTPATIENT)
Dept: ADMINISTRATIVE | Facility: OTHER | Age: 70
End: 2020-05-06

## 2020-05-06 ENCOUNTER — TELEPHONE (OUTPATIENT)
Dept: FAMILY MEDICINE | Facility: CLINIC | Age: 70
End: 2020-05-06

## 2020-05-06 NOTE — TELEPHONE ENCOUNTER
Called and spoke with the patient, patient concerned on how to perform virtual visit, all instructions given to the patient on how to perform video visit.  Patient verbalized understanding of instructions.

## 2020-05-07 ENCOUNTER — OFFICE VISIT (OUTPATIENT)
Dept: FAMILY MEDICINE | Facility: CLINIC | Age: 70
End: 2020-05-07
Payer: MEDICARE

## 2020-05-07 DIAGNOSIS — F51.01 PRIMARY INSOMNIA: Primary | ICD-10-CM

## 2020-05-07 PROCEDURE — 1100F PR PT FALLS ASSESS DOC 2+ FALLS/FALL W/INJURY/YR: ICD-10-PCS | Mod: HCNC,CPTII,95, | Performed by: NURSE PRACTITIONER

## 2020-05-07 PROCEDURE — 99213 PR OFFICE/OUTPT VISIT, EST, LEVL III, 20-29 MIN: ICD-10-PCS | Mod: HCNC,95,, | Performed by: NURSE PRACTITIONER

## 2020-05-07 PROCEDURE — 99213 OFFICE O/P EST LOW 20 MIN: CPT | Mod: HCNC,95,, | Performed by: NURSE PRACTITIONER

## 2020-05-07 PROCEDURE — 3288F FALL RISK ASSESSMENT DOCD: CPT | Mod: HCNC,CPTII,95, | Performed by: NURSE PRACTITIONER

## 2020-05-07 PROCEDURE — 3288F PR FALLS RISK ASSESSMENT DOCUMENTED: ICD-10-PCS | Mod: HCNC,CPTII,95, | Performed by: NURSE PRACTITIONER

## 2020-05-07 PROCEDURE — 1159F PR MEDICATION LIST DOCUMENTED IN MEDICAL RECORD: ICD-10-PCS | Mod: HCNC,95,, | Performed by: NURSE PRACTITIONER

## 2020-05-07 PROCEDURE — 1159F MED LIST DOCD IN RCRD: CPT | Mod: HCNC,95,, | Performed by: NURSE PRACTITIONER

## 2020-05-07 PROCEDURE — 1100F PTFALLS ASSESS-DOCD GE2>/YR: CPT | Mod: HCNC,CPTII,95, | Performed by: NURSE PRACTITIONER

## 2020-05-07 RX ORDER — ZOLPIDEM TARTRATE 5 MG/1
5 TABLET ORAL NIGHTLY PRN
Qty: 30 TABLET | Refills: 5 | Status: SHIPPED | OUTPATIENT
Start: 2020-05-07 | End: 2021-03-22 | Stop reason: SDUPTHER

## 2020-05-07 NOTE — PROGRESS NOTES
Subjective:       Patient ID: Cruz Dover is a 69 y.o. male.    Chief Complaint: No chief complaint on file.    Medication Refill   This is a chronic (Ambien) problem. The current episode started more than 1 year ago. The problem occurs daily. The problem has been unchanged. Associated symptoms include arthralgias. Pertinent negatives include no chest pain, headaches, joint swelling, neck pain, vomiting or weakness. The treatment provided significant relief.       Review of Systems   Constitutional: Negative for activity change and unexpected weight change.   HENT: Positive for trouble swallowing. Negative for hearing loss and rhinorrhea.    Eyes: Negative for discharge and visual disturbance.   Respiratory: Negative for chest tightness and wheezing.    Cardiovascular: Negative for chest pain and palpitations.   Gastrointestinal: Negative for blood in stool, constipation, diarrhea and vomiting.   Endocrine: Negative for polydipsia and polyuria.   Genitourinary: Positive for urgency. Negative for difficulty urinating and hematuria.   Musculoskeletal: Positive for arthralgias. Negative for joint swelling and neck pain.   Neurological: Negative for weakness and headaches.   Psychiatric/Behavioral: Negative for confusion and dysphoric mood.       There were no vitals filed for this visit.    Objective:     Current Outpatient Medications   Medication Sig Dispense Refill    amitriptyline (ELAVIL) 25 MG tablet Take 25 mg by mouth nightly as needed for Insomnia.      aspirin 81 MG chewable tablet once daily.       baclofen (LIORESAL) 10 MG tablet Take 10 mg by mouth 3 (three) times daily.      clopidogrel (PLAVIX) 75 mg tablet Take 1 tablet (75 mg total) by mouth once daily. 90 tablet 3    diclofenac sodium (VOLTAREN) 1 % Gel Apply 2 g topically 4 (four) times daily. 300 g 0    docusate sodium (COLACE) 100 MG capsule Take 100 mg by mouth 2 (two) times daily.      furosemide (LASIX) 20 MG tablet Take 20 mg by  mouth as needed.       gabapentin (NEURONTIN) 300 MG capsule Take 300 mg by mouth.      hydrOXYzine pamoate (VISTARIL) 25 MG Cap Take 25 mg by mouth every 8 (eight) hours as needed.      magnesium oxide (MAG-OX) 400 mg (241.3 mg magnesium) tablet Take 400 mg by mouth once daily.      metFORMIN (GLUCOPHAGE-XR) 500 MG XR 24hr tablet Take 500 mg by mouth.      midodrine (PROAMATINE) 5 MG Tab Take 1 tablet (5 mg total) by mouth 3 (three) times daily with meals. 90 tablet 5    nitrofurantoin, macrocrystal-monohydrate, (MACROBID) 100 MG capsule Take 100 mg by mouth 2 (two) times daily.      nitroGLYCERIN (NITROSTAT) 0.4 MG SL tablet       oxyCODONE (ROXICODONE) 10 mg Tab immediate release tablet Take 1 tablet (10 mg total) by mouth 2 (two) times daily as needed for Pain. > than 7 days Medically necessary 40 tablet 0    polyethylene glycol (GLYCOLAX) 17 gram PwPk Take by mouth.      rosuvastatin (CRESTOR) 40 MG Tab Take 1 tablet (40 mg total) by mouth once daily. 90 tablet 3    traMADol (ULTRAM) 50 mg tablet Take 1 tablet (50 mg total) by mouth every 6 (six) hours as needed. 40 tablet 2    zolpidem (AMBIEN) 5 MG Tab Take 1 tablet (5 mg total) by mouth nightly as needed. 30 tablet 5     No current facility-administered medications for this visit.        Physical Exam   Constitutional: He is oriented to person, place, and time. He appears well-developed.   HENT:   Head: Normocephalic.   Eyes: EOM are normal.   Neck: Normal range of motion.   Pulmonary/Chest: Effort normal. No respiratory distress.   Neurological: He is alert and oriented to person, place, and time.   Psychiatric: He has a normal mood and affect. Judgment and thought content normal.       Assessment:       1. Primary insomnia        Plan:   Primary insomnia    Other orders  -     zolpidem (AMBIEN) 5 MG Tab; Take 1 tablet (5 mg total) by mouth nightly as needed.  Dispense: 30 tablet; Refill: 5        No follow-ups on file.    There are no Patient  Instructions on file for this visit.

## 2020-05-15 ENCOUNTER — PATIENT OUTREACH (OUTPATIENT)
Dept: OTHER | Facility: OTHER | Age: 70
End: 2020-05-15

## 2020-05-15 NOTE — PROGRESS NOTES
".Digital Medicine: Clinician Follow-Up    05/15/2020  Called patient as received ALERT about elevated BP.  He states that he is feeling "out of sorts."  Speech is slurred.  He said he has been having "issues."  Encouraged him to seek medical attention immediately.  He also contacted his Cardiologist who recommended the same.     The history is provided by the patient. No  was used.     Follow Up  Follow-up reason(s): reading review      Alert received.   Care Team received high BP alert.  Patient is experiencing symptomsof hypertension.        INTERVENTION(S)  encouragement/support    PLAN  continue monitoring    Pt will report to nearest Emergency Room for evaluation.            Last 5 Patient Entered Readings                                      Current 30 Day Average: 127/73     Recent Readings 5/15/2020 5/15/2020 5/15/2020 5/15/2020 5/5/2020    SBP (mmHg) 191 181 185 182 145    DBP (mmHg) 93 94 94 92 77    Pulse 57 57 57 58 60             Hypertension Medications             furosemide (LASIX) 20 MG tablet Take 20 mg by mouth as needed.     nitroGLYCERIN (NITROSTAT) 0.4 MG SL tablet                  Screenings  "

## 2020-05-19 NOTE — PROGRESS NOTES
05/19/2020  Pt called and LM that readings were higher after we spoke on 5/15 so he did not go to ED.     Last 5 Patient Entered Readings                                      Current 30 Day Average: 117/72     Recent Readings 5/17/2020 5/16/2020 5/16/2020 5/16/2020 5/16/2020    SBP (mmHg) 102 146 149 130 137    DBP (mmHg) 57 75 78 70 81    Pulse 65 54 55 61 57

## 2020-06-29 ENCOUNTER — NURSE TRIAGE (OUTPATIENT)
Dept: ADMINISTRATIVE | Facility: CLINIC | Age: 70
End: 2020-06-29

## 2020-08-11 ENCOUNTER — PATIENT OUTREACH (OUTPATIENT)
Dept: OTHER | Facility: OTHER | Age: 70
End: 2020-08-11

## 2020-08-11 NOTE — PROGRESS NOTES
Digital Medicine: Clinician Follow-Up    08/11/2020  Spoke to patient to confirm that he will be dis-enrolled today.  He is not an appropriate candidate for this Program.  He verbalized understanding and expressed appreciation for the attention I've given him since he enrolled.            Last 5 Patient Entered Readings                                      Current 30 Day Average: 118/68     Recent Readings 8/10/2020 8/2/2020 8/2/2020 7/29/2020 7/29/2020    SBP (mmHg) 144 101 89 103 96    DBP (mmHg) 79 68 62 65 62    Pulse 59 72 79 81 79             Screenings    Assessment/Plan          Hypertension Medications             furosemide (LASIX) 20 MG tablet Take 20 mg by mouth as needed.     nitroGLYCERIN (NITROSTAT) 0.4 MG SL tablet

## 2020-08-23 NOTE — PROGRESS NOTES
"DATE: 2/25/2019  PATIENT: Cruz Dover    HISTORY:  Cruz Dover is a 68 y.o. male who returns for follow up evaluation of his right knee pain.  He is diagnosed with osteoarthritis. He underwent Euflexxa injection # 1 last week without complication. He presents today for Euflexxa injection # 2.  He does note improvement in pain since receiving an injection last week.  His pain rating today is 0/10.      PMH/PSH/FamHx/SocHx:  Reviewed and unchanged from prior visit    ROS:  Constitution: Negative for chills, fever, and sweats. Negative for unexplained weight loss.  HENT: Negative for headaches and blurry vision.   Cardiovascular: Negative for chest pain, irregular heartbeat, leg swelling and palpitations.   Respiratory: Negative for cough and shortness of breath.   Gastrointestinal: Negative for abdominal pain, heartburn, nausea and vomiting.   Genitourinary: Negative for bladder incontinence and dysuria.   Musculoskeletal: Negative for systemic arthritis, joint swelling, muscle weakness and myalgias.   Neurological: Negative for numbness.   Psychiatric/Behavioral: Negative for depression.   Endocrine: Negative for polyuria.   Hematologic/Lymphatic: Negative for bleeding disorders.  Skin: Negative for poor wound healing.       EXAM:  Ht 5' 10" (1.778 m)   Wt 89.8 kg (198 lb)   BMI 28.41 kg/m²   Cruz Dover is a well developed, well nourished male in no acute distress. Physical examination of the right knee evaluated the following:     Gait and Alignment  Inspection for ecchymosis, swelling, atrophy, or deformity  Inspection for intra-articular and/or bursal effusions  Tenderness to palpation over the bony and soft tissue structures around the knee  Range of Motion and presence of extensor lag/contractures  Sensation and motor strength  Varus/valgus or anterior/posterior/rotatory instability  Flexion pinch and Salvador's Tests  Patellar alignment/tracking/pain to palpation  Vascular exam to include skin " temperature/color/capillary refill     Remarkable findings included:  No edema, effusion, ecchymosis or obvious deformity  Nontender to palpation   ROM full  Strength 5/5  No gross instability  Sensation intact  Skin warm, dry, intact        IMAGING:   X-ray obtained Right knee performed 02/06/19 personally reviewed with patient. Radiologist report as follows:   There is no radiographic evidence of acute osseous, articular, or soft tissue abnormality.  There is mild joint space loss in the medial tibiofemoral compartments bilaterally.  Multiple surgical clips noted in the medial soft tissues of the left lower extremity. Scattered vascular calcifications are present.     ASSESSMENT:  Right knee osteoarthritis    PLAN:  The implications of the patient's evolution of symptoms and findings were explained to the patient in detail.  Euflexxa injection #2 performed Right knee today (see procedure documentation).  He will monitor for signs and symptoms of infection/inflammation.  Instructed to elevate legs and apply ice tonight.  He will return next week for Euflexxa injection #3 or for follow up evaluation as needed.   No

## 2020-09-29 ENCOUNTER — PATIENT MESSAGE (OUTPATIENT)
Dept: OTHER | Facility: OTHER | Age: 70
End: 2020-09-29

## 2020-10-22 ENCOUNTER — PATIENT MESSAGE (OUTPATIENT)
Dept: FAMILY MEDICINE | Facility: CLINIC | Age: 70
End: 2020-10-22

## 2020-10-22 NOTE — TELEPHONE ENCOUNTER
Let him know that the orthopedic nurse practitioner is no longer here.  We can probably x-ray his shoulder and take care of his anxiety.  Both Carly and I can take care of these.  Please schedule him an appointment as soon as possible with one of us to take care of his problems.

## 2020-12-11 ENCOUNTER — PATIENT MESSAGE (OUTPATIENT)
Dept: OTHER | Facility: OTHER | Age: 70
End: 2020-12-11

## 2021-03-07 ENCOUNTER — PATIENT MESSAGE (OUTPATIENT)
Dept: FAMILY MEDICINE | Facility: CLINIC | Age: 71
End: 2021-03-07

## 2021-03-17 ENCOUNTER — PATIENT OUTREACH (OUTPATIENT)
Dept: ADMINISTRATIVE | Facility: HOSPITAL | Age: 71
End: 2021-03-17

## 2021-03-22 ENCOUNTER — TELEPHONE (OUTPATIENT)
Dept: FAMILY MEDICINE | Facility: CLINIC | Age: 71
End: 2021-03-22

## 2021-03-22 ENCOUNTER — OFFICE VISIT (OUTPATIENT)
Dept: FAMILY MEDICINE | Facility: CLINIC | Age: 71
End: 2021-03-22
Payer: MEDICARE

## 2021-03-22 DIAGNOSIS — Z76.0 MEDICATION REFILL: Primary | ICD-10-CM

## 2021-03-22 PROCEDURE — 99212 PR OFFICE/OUTPT VISIT, EST, LEVL II, 10-19 MIN: ICD-10-PCS | Mod: 95,,, | Performed by: NURSE PRACTITIONER

## 2021-03-22 PROCEDURE — 99212 OFFICE O/P EST SF 10 MIN: CPT | Mod: 95,,, | Performed by: NURSE PRACTITIONER

## 2021-03-22 PROCEDURE — 1159F PR MEDICATION LIST DOCUMENTED IN MEDICAL RECORD: ICD-10-PCS | Mod: 95,,, | Performed by: NURSE PRACTITIONER

## 2021-03-22 PROCEDURE — 1159F MED LIST DOCD IN RCRD: CPT | Mod: 95,,, | Performed by: NURSE PRACTITIONER

## 2021-03-22 RX ORDER — ZOLPIDEM TARTRATE 5 MG/1
5 TABLET ORAL NIGHTLY PRN
Qty: 30 TABLET | Refills: 0 | Status: SHIPPED | OUTPATIENT
Start: 2021-03-22 | End: 2021-05-19

## 2021-03-23 ENCOUNTER — PATIENT MESSAGE (OUTPATIENT)
Dept: ADMINISTRATIVE | Facility: OTHER | Age: 71
End: 2021-03-23

## 2021-03-30 ENCOUNTER — PATIENT MESSAGE (OUTPATIENT)
Dept: FAMILY MEDICINE | Facility: CLINIC | Age: 71
End: 2021-03-30

## 2021-03-30 DIAGNOSIS — R19.5 CHANGE IN CONSISTENCY OF STOOL: Primary | ICD-10-CM

## 2021-03-31 ENCOUNTER — PES CALL (OUTPATIENT)
Dept: ADMINISTRATIVE | Facility: CLINIC | Age: 71
End: 2021-03-31

## 2021-03-31 ENCOUNTER — OUTSIDE PLACE OF SERVICE (OUTPATIENT)
Dept: FAMILY MEDICINE | Facility: CLINIC | Age: 71
End: 2021-03-31
Payer: MEDICARE

## 2021-03-31 PROCEDURE — 99305 PR NURSING FACILITY CARE, INIT, MOD SEVERITY: ICD-10-PCS | Mod: ,,, | Performed by: FAMILY MEDICINE

## 2021-03-31 PROCEDURE — 99305 1ST NF CARE MODERATE MDM 35: CPT | Mod: ,,, | Performed by: FAMILY MEDICINE

## 2021-04-09 ENCOUNTER — TELEPHONE (OUTPATIENT)
Dept: ENDOSCOPY | Facility: HOSPITAL | Age: 71
End: 2021-04-09

## 2021-04-09 DIAGNOSIS — R19.5 CHANGE IN STOOL: Primary | ICD-10-CM

## 2021-04-13 ENCOUNTER — TELEPHONE (OUTPATIENT)
Dept: ENDOSCOPY | Facility: HOSPITAL | Age: 71
End: 2021-04-13

## 2021-04-14 ENCOUNTER — OUTSIDE PLACE OF SERVICE (OUTPATIENT)
Dept: FAMILY MEDICINE | Facility: CLINIC | Age: 71
End: 2021-04-14
Payer: MEDICARE

## 2021-04-14 PROCEDURE — 99309 SBSQ NF CARE MODERATE MDM 30: CPT | Mod: ,,, | Performed by: FAMILY MEDICINE

## 2021-04-14 PROCEDURE — 99309 PR NURSING FAC CARE, SUBSEQ, SIGNIF COMPLIC: ICD-10-PCS | Mod: ,,, | Performed by: FAMILY MEDICINE

## 2021-05-03 ENCOUNTER — PATIENT OUTREACH (OUTPATIENT)
Dept: ADMINISTRATIVE | Facility: OTHER | Age: 71
End: 2021-05-03

## 2021-05-11 ENCOUNTER — TELEPHONE (OUTPATIENT)
Dept: ADMINISTRATIVE | Facility: CLINIC | Age: 71
End: 2021-05-11

## 2021-05-17 ENCOUNTER — TELEPHONE (OUTPATIENT)
Dept: ADMINISTRATIVE | Facility: CLINIC | Age: 71
End: 2021-05-17

## 2021-05-19 ENCOUNTER — OFFICE VISIT (OUTPATIENT)
Dept: FAMILY MEDICINE | Facility: CLINIC | Age: 71
End: 2021-05-19
Payer: MEDICARE

## 2021-05-19 VITALS — HEIGHT: 70 IN | BODY MASS INDEX: 27.77 KG/M2 | WEIGHT: 194 LBS

## 2021-05-19 DIAGNOSIS — K59.2 NEUROGENIC BOWEL: ICD-10-CM

## 2021-05-19 DIAGNOSIS — Z00.00 ENCOUNTER FOR PREVENTIVE HEALTH EXAMINATION: Primary | ICD-10-CM

## 2021-05-19 DIAGNOSIS — E78.5 HYPERLIPIDEMIA, UNSPECIFIED HYPERLIPIDEMIA TYPE: ICD-10-CM

## 2021-05-19 DIAGNOSIS — I25.10 CORONARY ARTERY DISEASE INVOLVING NATIVE CORONARY ARTERY OF NATIVE HEART WITHOUT ANGINA PECTORIS: ICD-10-CM

## 2021-05-19 DIAGNOSIS — D68.9 CLOTTING DISORDER: ICD-10-CM

## 2021-05-19 DIAGNOSIS — I70.0 AORTIC ATHEROSCLEROSIS: ICD-10-CM

## 2021-05-19 DIAGNOSIS — Z93.3 COLOSTOMY PRESENT: ICD-10-CM

## 2021-05-19 DIAGNOSIS — E11.65 CONTROLLED TYPE 2 DIABETES MELLITUS WITH HYPERGLYCEMIA, WITHOUT LONG-TERM CURRENT USE OF INSULIN: ICD-10-CM

## 2021-05-19 DIAGNOSIS — F51.01 PRIMARY INSOMNIA: ICD-10-CM

## 2021-05-19 DIAGNOSIS — Z78.9 INTERMITTENT SELF-CATHETERIZATION OF BLADDER: ICD-10-CM

## 2021-05-19 DIAGNOSIS — F43.12 CHRONIC POST-TRAUMATIC STRESS DISORDER: ICD-10-CM

## 2021-05-19 DIAGNOSIS — I50.22 CHRONIC SYSTOLIC CHF (CONGESTIVE HEART FAILURE): ICD-10-CM

## 2021-05-19 DIAGNOSIS — R26.9 ABNORMALITY OF GAIT AND MOBILITY: ICD-10-CM

## 2021-05-19 DIAGNOSIS — Z79.02 PLATELET INHIBITION DUE TO PLAVIX: ICD-10-CM

## 2021-05-19 DIAGNOSIS — Z99.3 DEPENDENCE ON WHEELCHAIR: ICD-10-CM

## 2021-05-19 DIAGNOSIS — G90.3 NEUROGENIC ORTHOSTATIC HYPOTENSION: ICD-10-CM

## 2021-05-19 DIAGNOSIS — E66.3 OVERWEIGHT (BMI 25.0-29.9): ICD-10-CM

## 2021-05-19 DIAGNOSIS — N31.9 NEUROGENIC BLADDER: ICD-10-CM

## 2021-05-19 PROBLEM — I25.5 ISCHEMIC CARDIOMYOPATHY: Status: ACTIVE | Noted: 2020-02-17

## 2021-05-19 PROBLEM — H90.3 BILATERAL SENSORINEURAL HEARING LOSS: Status: ACTIVE | Noted: 2021-05-19

## 2021-05-19 PROBLEM — K59.00 CONSTIPATION DUE TO NEUROGENIC BOWEL: Status: ACTIVE | Noted: 2020-02-16

## 2021-05-19 PROBLEM — M51.36 DDD (DEGENERATIVE DISC DISEASE), LUMBAR: Status: ACTIVE | Noted: 2019-03-30

## 2021-05-19 PROBLEM — Z98.1 S/P CERVICAL SPINAL FUSION: Status: ACTIVE | Noted: 2019-04-01

## 2021-05-19 PROBLEM — K57.30 DIVERTICULOSIS OF COLON: Status: ACTIVE | Noted: 2020-02-16

## 2021-05-19 PROBLEM — K59.00 CONSTIPATION DUE TO NEUROGENIC BOWEL: Status: RESOLVED | Noted: 2020-02-16 | Resolved: 2021-05-19

## 2021-05-19 PROCEDURE — 1158F ADVNC CARE PLAN TLK DOCD: CPT | Mod: HCNC,S$GLB,, | Performed by: NURSE PRACTITIONER

## 2021-05-19 PROCEDURE — 3008F BODY MASS INDEX DOCD: CPT | Mod: HCNC,CPTII,S$GLB, | Performed by: NURSE PRACTITIONER

## 2021-05-19 PROCEDURE — G0439 PR MEDICARE ANNUAL WELLNESS SUBSEQUENT VISIT: ICD-10-PCS | Mod: 95,HCNC,, | Performed by: NURSE PRACTITIONER

## 2021-05-19 PROCEDURE — 1101F PR PT FALLS ASSESS DOC 0-1 FALLS W/OUT INJ PAST YR: ICD-10-PCS | Mod: HCNC,CPTII,S$GLB, | Performed by: NURSE PRACTITIONER

## 2021-05-19 PROCEDURE — 3008F PR BODY MASS INDEX (BMI) DOCUMENTED: ICD-10-PCS | Mod: HCNC,CPTII,S$GLB, | Performed by: NURSE PRACTITIONER

## 2021-05-19 PROCEDURE — G0439 PPPS, SUBSEQ VISIT: HCPCS | Mod: 95,HCNC,, | Performed by: NURSE PRACTITIONER

## 2021-05-19 PROCEDURE — 3288F PR FALLS RISK ASSESSMENT DOCUMENTED: ICD-10-PCS | Mod: HCNC,CPTII,S$GLB, | Performed by: NURSE PRACTITIONER

## 2021-05-19 PROCEDURE — 1125F PR PAIN SEVERITY QUANTIFIED, PAIN PRESENT: ICD-10-PCS | Mod: HCNC,S$GLB,, | Performed by: NURSE PRACTITIONER

## 2021-05-19 PROCEDURE — 99499 UNLISTED E&M SERVICE: CPT | Mod: HCNC,S$GLB,, | Performed by: NURSE PRACTITIONER

## 2021-05-19 PROCEDURE — 1158F PR ADVANCE CARE PLANNING DISCUSS DOCUMENTED IN MEDICAL RECORD: ICD-10-PCS | Mod: HCNC,S$GLB,, | Performed by: NURSE PRACTITIONER

## 2021-05-19 PROCEDURE — 99499 RISK ADDL DX/OHS AUDIT: ICD-10-PCS | Mod: HCNC,S$GLB,, | Performed by: NURSE PRACTITIONER

## 2021-05-19 PROCEDURE — 3288F FALL RISK ASSESSMENT DOCD: CPT | Mod: HCNC,CPTII,S$GLB, | Performed by: NURSE PRACTITIONER

## 2021-05-19 PROCEDURE — 1101F PT FALLS ASSESS-DOCD LE1/YR: CPT | Mod: HCNC,CPTII,S$GLB, | Performed by: NURSE PRACTITIONER

## 2021-05-19 PROCEDURE — 1125F AMNT PAIN NOTED PAIN PRSNT: CPT | Mod: HCNC,S$GLB,, | Performed by: NURSE PRACTITIONER

## 2021-05-19 RX ORDER — SENNOSIDES 8.6 MG/1
1 TABLET ORAL DAILY
COMMUNITY
Start: 2021-03-05

## 2021-05-19 RX ORDER — ASCORBIC ACID 500 MG
1 TABLET ORAL DAILY
COMMUNITY
Start: 2020-11-24

## 2021-05-19 RX ORDER — AMITRIPTYLINE HYDROCHLORIDE 25 MG/1
25 TABLET, FILM COATED ORAL NIGHTLY
COMMUNITY
End: 2021-05-19

## 2021-05-19 RX ORDER — CHOLECALCIFEROL (VITAMIN D3) 25 MCG
1 TABLET ORAL DAILY
COMMUNITY
Start: 2021-03-01

## 2021-05-19 RX ORDER — GABAPENTIN 300 MG/1
900 CAPSULE ORAL 3 TIMES DAILY
COMMUNITY
Start: 2021-03-05

## 2021-05-19 RX ORDER — DULOXETIN HYDROCHLORIDE 60 MG/1
1 CAPSULE, DELAYED RELEASE ORAL DAILY
COMMUNITY
Start: 2021-03-05

## 2021-05-19 RX ORDER — MELATONIN 3 MG
1 CAPSULE ORAL DAILY
COMMUNITY
Start: 2021-04-16

## 2021-05-19 RX ORDER — SIMETHICONE 80 MG
1 TABLET,CHEWABLE ORAL 3 TIMES DAILY PRN
COMMUNITY
Start: 2020-06-29

## 2021-05-19 RX ORDER — DULOXETIN HYDROCHLORIDE 30 MG/1
1 CAPSULE, DELAYED RELEASE ORAL NIGHTLY
COMMUNITY
Start: 2021-03-05

## 2021-06-15 ENCOUNTER — TELEPHONE (OUTPATIENT)
Dept: FAMILY MEDICINE | Facility: CLINIC | Age: 71
End: 2021-06-15

## 2021-06-16 ENCOUNTER — OUTSIDE PLACE OF SERVICE (OUTPATIENT)
Dept: FAMILY MEDICINE | Facility: CLINIC | Age: 71
End: 2021-06-16
Payer: MEDICARE

## 2021-06-16 PROCEDURE — 99499 UNLISTED E&M SERVICE: CPT | Mod: ,,, | Performed by: FAMILY MEDICINE

## 2021-06-16 PROCEDURE — 99499 NO LOS: ICD-10-PCS | Mod: ,,, | Performed by: FAMILY MEDICINE

## 2021-06-17 ENCOUNTER — PATIENT MESSAGE (OUTPATIENT)
Dept: FAMILY MEDICINE | Facility: CLINIC | Age: 71
End: 2021-06-17

## 2021-06-21 RX ORDER — ROSUVASTATIN CALCIUM 40 MG/1
40 TABLET, COATED ORAL DAILY
Qty: 90 TABLET | Refills: 3 | Status: SHIPPED | OUTPATIENT
Start: 2021-06-21

## 2021-08-10 ENCOUNTER — TELEPHONE (OUTPATIENT)
Dept: FAMILY MEDICINE | Facility: CLINIC | Age: 71
End: 2021-08-10

## 2021-08-13 ENCOUNTER — OFFICE VISIT (OUTPATIENT)
Dept: FAMILY MEDICINE | Facility: CLINIC | Age: 71
End: 2021-08-13
Payer: MEDICARE

## 2021-08-13 VITALS
OXYGEN SATURATION: 96 % | HEART RATE: 76 BPM | WEIGHT: 200 LBS | TEMPERATURE: 98 F | DIASTOLIC BLOOD PRESSURE: 46 MMHG | HEIGHT: 70 IN | BODY MASS INDEX: 28.63 KG/M2 | SYSTOLIC BLOOD PRESSURE: 86 MMHG

## 2021-08-13 DIAGNOSIS — M62.81 GENERALIZED MUSCLE WEAKNESS: ICD-10-CM

## 2021-08-13 DIAGNOSIS — F51.01 PRIMARY INSOMNIA: Primary | ICD-10-CM

## 2021-08-13 DIAGNOSIS — G80.0 SPASTIC QUADRIPLEGIC CEREBRAL PALSY: ICD-10-CM

## 2021-08-13 PROCEDURE — 1101F PR PT FALLS ASSESS DOC 0-1 FALLS W/OUT INJ PAST YR: ICD-10-PCS | Mod: CPTII,S$GLB,, | Performed by: NURSE PRACTITIONER

## 2021-08-13 PROCEDURE — 3074F SYST BP LT 130 MM HG: CPT | Mod: CPTII,S$GLB,, | Performed by: NURSE PRACTITIONER

## 2021-08-13 PROCEDURE — 1160F RVW MEDS BY RX/DR IN RCRD: CPT | Mod: CPTII,S$GLB,, | Performed by: NURSE PRACTITIONER

## 2021-08-13 PROCEDURE — 3078F PR MOST RECENT DIASTOLIC BLOOD PRESSURE < 80 MM HG: ICD-10-PCS | Mod: CPTII,S$GLB,, | Performed by: NURSE PRACTITIONER

## 2021-08-13 PROCEDURE — 99214 OFFICE O/P EST MOD 30 MIN: CPT | Mod: S$GLB,,, | Performed by: NURSE PRACTITIONER

## 2021-08-13 PROCEDURE — 1160F PR REVIEW ALL MEDS BY PRESCRIBER/CLIN PHARMACIST DOCUMENTED: ICD-10-PCS | Mod: CPTII,S$GLB,, | Performed by: NURSE PRACTITIONER

## 2021-08-13 PROCEDURE — 1159F PR MEDICATION LIST DOCUMENTED IN MEDICAL RECORD: ICD-10-PCS | Mod: CPTII,S$GLB,, | Performed by: NURSE PRACTITIONER

## 2021-08-13 PROCEDURE — 99214 PR OFFICE/OUTPT VISIT, EST, LEVL IV, 30-39 MIN: ICD-10-PCS | Mod: S$GLB,,, | Performed by: NURSE PRACTITIONER

## 2021-08-13 PROCEDURE — 3078F DIAST BP <80 MM HG: CPT | Mod: CPTII,S$GLB,, | Performed by: NURSE PRACTITIONER

## 2021-08-13 PROCEDURE — 3288F FALL RISK ASSESSMENT DOCD: CPT | Mod: CPTII,S$GLB,, | Performed by: NURSE PRACTITIONER

## 2021-08-13 PROCEDURE — 1101F PT FALLS ASSESS-DOCD LE1/YR: CPT | Mod: CPTII,S$GLB,, | Performed by: NURSE PRACTITIONER

## 2021-08-13 PROCEDURE — 1159F MED LIST DOCD IN RCRD: CPT | Mod: CPTII,S$GLB,, | Performed by: NURSE PRACTITIONER

## 2021-08-13 PROCEDURE — 1126F AMNT PAIN NOTED NONE PRSNT: CPT | Mod: CPTII,S$GLB,, | Performed by: NURSE PRACTITIONER

## 2021-08-13 PROCEDURE — 1126F PR PAIN SEVERITY QUANTIFIED, NO PAIN PRESENT: ICD-10-PCS | Mod: CPTII,S$GLB,, | Performed by: NURSE PRACTITIONER

## 2021-08-13 PROCEDURE — 3074F PR MOST RECENT SYSTOLIC BLOOD PRESSURE < 130 MM HG: ICD-10-PCS | Mod: CPTII,S$GLB,, | Performed by: NURSE PRACTITIONER

## 2021-08-13 PROCEDURE — 3008F BODY MASS INDEX DOCD: CPT | Mod: CPTII,S$GLB,, | Performed by: NURSE PRACTITIONER

## 2021-08-13 PROCEDURE — 99999 PR PBB SHADOW E&M-EST. PATIENT-LVL IV: ICD-10-PCS | Mod: PBBFAC,,, | Performed by: NURSE PRACTITIONER

## 2021-08-13 PROCEDURE — 3008F PR BODY MASS INDEX (BMI) DOCUMENTED: ICD-10-PCS | Mod: CPTII,S$GLB,, | Performed by: NURSE PRACTITIONER

## 2021-08-13 PROCEDURE — 3288F PR FALLS RISK ASSESSMENT DOCUMENTED: ICD-10-PCS | Mod: CPTII,S$GLB,, | Performed by: NURSE PRACTITIONER

## 2021-08-13 PROCEDURE — 99999 PR PBB SHADOW E&M-EST. PATIENT-LVL IV: CPT | Mod: PBBFAC,,, | Performed by: NURSE PRACTITIONER

## 2021-10-15 RX ORDER — ZOLPIDEM TARTRATE 5 MG/1
TABLET ORAL
Qty: 30 TABLET | Refills: 5 | Status: SHIPPED | OUTPATIENT
Start: 2021-10-15

## 2021-12-04 ENCOUNTER — PATIENT MESSAGE (OUTPATIENT)
Dept: ADMINISTRATIVE | Facility: OTHER | Age: 71
End: 2021-12-04
Payer: MEDICARE

## 2021-12-07 ENCOUNTER — PATIENT MESSAGE (OUTPATIENT)
Dept: ADMINISTRATIVE | Facility: OTHER | Age: 71
End: 2021-12-07
Payer: MEDICARE

## 2021-12-09 ENCOUNTER — TELEPHONE (OUTPATIENT)
Dept: FAMILY MEDICINE | Facility: CLINIC | Age: 71
End: 2021-12-09
Payer: MEDICARE

## 2022-01-24 ENCOUNTER — PATIENT MESSAGE (OUTPATIENT)
Dept: FAMILY MEDICINE | Facility: CLINIC | Age: 72
End: 2022-01-24
Payer: MEDICARE

## 2022-01-27 ENCOUNTER — TELEPHONE (OUTPATIENT)
Dept: FAMILY MEDICINE | Facility: CLINIC | Age: 72
End: 2022-01-27
Payer: MEDICARE

## 2022-01-27 NOTE — TELEPHONE ENCOUNTER
"----- Message from Catarina Yolanda sent at 1/26/2022  1:08 PM CST -----  Hi Dr. Binh Cat,    We were just informed today, 1/26/2022, that Cruz "Deng" Stanislaw passed away yesterday per his wife.      Thank you,  The Digital Medicine Team    "

## 2023-05-30 NOTE — PROGRESS NOTES
Last 5 Patient Entered Readings                                      Current 30 Day Average: 132/71     Recent Readings 3/17/2019 3/10/2019 3/9/2019 3/9/2019 3/3/2019    SBP (mmHg) 134 127 144 138 148    DBP (mmHg) 72 71 73 72 73    Pulse 53 51 59 57 54          Digital Medicine: Health  Follow Up    Lifestyle Modifications:    1.Dietary Modifications (Sodium intake <2,000mg/day, food labels, dining out):   Mr. Dover states he is eating more fruits and vegetables and has cut back on sausage and vann. He says it is a little hard because that's his favorite food but he is working on it.     2.Physical Activity:   Patient states he is getting back on the treadmill going about 3-4 days a week for about 30 minutes. He is having issues with his hips still but is doing what he can. He has not started going to the gym yet but is working toward starting. He is still in the yard most of the day and mentions that the weather is starting to look up which is helping.     3.Medication Therapy: Patient has been compliant with the medication regimen.    4.Patient has the following medication side effects/concerns:   (Frequency/Alleviating factors/Precipitating factors, etc.)     Follow up with Mr. Cruz Dover completed. No further questions or concerns. Will continue to follow up to achieve health goals.     Information: Selecting Yes will display possible errors in your note based on the variables you have selected. This validation is only offered as a suggestion for you. PLEASE NOTE THAT THE VALIDATION TEXT WILL BE REMOVED WHEN YOU FINALIZE YOUR NOTE. IF YOU WANT TO FAX A PRELIMINARY NOTE YOU WILL NEED TO TOGGLE THIS TO 'NO' IF YOU DO NOT WANT IT IN YOUR FAXED NOTE.